# Patient Record
Sex: MALE | Race: WHITE | NOT HISPANIC OR LATINO | Employment: UNEMPLOYED | URBAN - METROPOLITAN AREA
[De-identification: names, ages, dates, MRNs, and addresses within clinical notes are randomized per-mention and may not be internally consistent; named-entity substitution may affect disease eponyms.]

---

## 2017-02-14 ENCOUNTER — LAB VISIT (OUTPATIENT)
Dept: LAB | Facility: HOSPITAL | Age: 42
End: 2017-02-14
Attending: INTERNAL MEDICINE
Payer: COMMERCIAL

## 2017-02-14 ENCOUNTER — OFFICE VISIT (OUTPATIENT)
Dept: INTERNAL MEDICINE | Facility: CLINIC | Age: 42
End: 2017-02-14
Payer: COMMERCIAL

## 2017-02-14 VITALS
DIASTOLIC BLOOD PRESSURE: 66 MMHG | TEMPERATURE: 98 F | HEART RATE: 70 BPM | HEIGHT: 69 IN | BODY MASS INDEX: 23.6 KG/M2 | WEIGHT: 159.38 LBS | SYSTOLIC BLOOD PRESSURE: 118 MMHG

## 2017-02-14 DIAGNOSIS — F17.200 SMOKER: ICD-10-CM

## 2017-02-14 DIAGNOSIS — F33.0 MAJOR DEPRESSIVE DISORDER, RECURRENT EPISODE, MILD: ICD-10-CM

## 2017-02-14 DIAGNOSIS — Z79.4 CONTROLLED TYPE 2 DIABETES MELLITUS WITHOUT COMPLICATION, WITH LONG-TERM CURRENT USE OF INSULIN: Primary | ICD-10-CM

## 2017-02-14 DIAGNOSIS — Z79.4 CONTROLLED TYPE 2 DIABETES MELLITUS WITHOUT COMPLICATION, WITH LONG-TERM CURRENT USE OF INSULIN: ICD-10-CM

## 2017-02-14 DIAGNOSIS — D22.9 ATYPICAL MOLE: ICD-10-CM

## 2017-02-14 DIAGNOSIS — E11.9 CONTROLLED TYPE 2 DIABETES MELLITUS WITHOUT COMPLICATION, WITH LONG-TERM CURRENT USE OF INSULIN: ICD-10-CM

## 2017-02-14 DIAGNOSIS — H60.331 ACUTE SWIMMER'S EAR OF RIGHT SIDE: ICD-10-CM

## 2017-02-14 DIAGNOSIS — E11.9 CONTROLLED TYPE 2 DIABETES MELLITUS WITHOUT COMPLICATION, WITH LONG-TERM CURRENT USE OF INSULIN: Primary | ICD-10-CM

## 2017-02-14 LAB
ALBUMIN SERPL BCP-MCNC: 3.9 G/DL
ALP SERPL-CCNC: 73 U/L
ALT SERPL W/O P-5'-P-CCNC: 29 U/L
ANION GAP SERPL CALC-SCNC: 5 MMOL/L
AST SERPL-CCNC: 33 U/L
BILIRUB SERPL-MCNC: 0.3 MG/DL
BUN SERPL-MCNC: 10 MG/DL
CALCIUM SERPL-MCNC: 9.5 MG/DL
CHLORIDE SERPL-SCNC: 104 MMOL/L
CO2 SERPL-SCNC: 29 MMOL/L
CREAT SERPL-MCNC: 1.1 MG/DL
EST. GFR  (AFRICAN AMERICAN): >60 ML/MIN/1.73 M^2
EST. GFR  (NON AFRICAN AMERICAN): >60 ML/MIN/1.73 M^2
GLUCOSE SERPL-MCNC: 121 MG/DL
POTASSIUM SERPL-SCNC: 4.6 MMOL/L
PROT SERPL-MCNC: 6.7 G/DL
SODIUM SERPL-SCNC: 138 MMOL/L

## 2017-02-14 PROCEDURE — 2022F DILAT RTA XM EVC RTNOPTHY: CPT | Mod: S$GLB,,, | Performed by: INTERNAL MEDICINE

## 2017-02-14 PROCEDURE — 3060F POS MICROALBUMINURIA REV: CPT | Mod: S$GLB,,, | Performed by: INTERNAL MEDICINE

## 2017-02-14 PROCEDURE — 99213 OFFICE O/P EST LOW 20 MIN: CPT | Mod: S$GLB,,, | Performed by: INTERNAL MEDICINE

## 2017-02-14 PROCEDURE — 83036 HEMOGLOBIN GLYCOSYLATED A1C: CPT

## 2017-02-14 PROCEDURE — 3044F HG A1C LEVEL LT 7.0%: CPT | Mod: S$GLB,,, | Performed by: INTERNAL MEDICINE

## 2017-02-14 PROCEDURE — 99999 PR PBB SHADOW E&M-EST. PATIENT-LVL III: CPT | Mod: PBBFAC,,, | Performed by: INTERNAL MEDICINE

## 2017-02-14 PROCEDURE — 36415 COLL VENOUS BLD VENIPUNCTURE: CPT

## 2017-02-14 PROCEDURE — 80053 COMPREHEN METABOLIC PANEL: CPT

## 2017-02-14 RX ORDER — NEOMYCIN SULFATE, POLYMYXIN B SULFATE AND HYDROCORTISONE 10; 3.5; 1 MG/ML; MG/ML; [USP'U]/ML
3 SUSPENSION/ DROPS AURICULAR (OTIC) 4 TIMES DAILY
Qty: 10 ML | Refills: 0 | Status: SHIPPED | OUTPATIENT
Start: 2017-02-14 | End: 2017-02-24

## 2017-02-14 NOTE — PROGRESS NOTES
"Subjective:       Patient ID: Pierre Edmonds Jr. is a 41 y.o. male.    Chief Complaint: Diabetes    HPI - Mr. Edmonds feels well today, except for a sensation of something "running down his ear" periodically.  He's still smoking, though he plans to quit this year.  His depression/anxiety is much better now.  He's working with a counselor.  Some "zombie-like" feeling on the lexapro and wonders can he get off of that now. He feels much calmer. He's been taking it for 8 months. His BS has been averaging 150-155 post-meals at home.  Has a lingering cold with a cough for a couple of weeks.    PMH:  DM2, at goal last check.  Depression and anxiety, improving  Back and neck pain with multiple HNPs, now resolved  Smoker, 1ppd.  Wants to quit now  IBS  GERD     Meds:  Reviewed and reconciled in EPIC with patient during visit today.    Review of Systems   Constitutional: Negative for fatigue.   HENT: Negative for congestion.    Respiratory: Negative for shortness of breath.    Cardiovascular: Negative for chest pain.   Gastrointestinal: Negative for abdominal pain.   Genitourinary: Negative for difficulty urinating.   Musculoskeletal: Negative for arthralgias.   Skin: Negative for rash.   Neurological: Negative for headaches.   Psychiatric/Behavioral: Negative for sleep disturbance.       Objective:      Physical Exam   Constitutional: He is oriented to person, place, and time. He appears well-developed and well-nourished. No distress.   HENT:   Head: Normocephalic and atraumatic.   Cardiovascular: Normal rate, regular rhythm and normal heart sounds.  Exam reveals no gallop and no friction rub.    No murmur heard.  Pulmonary/Chest: No respiratory distress. He has no wheezes. He has no rales. He exhibits no tenderness.   Neurological: He is alert and oriented to person, place, and time.   Skin: Skin is warm. He is not diaphoretic. No erythema.   Psychiatric: He has a normal mood and affect. Thought content normal.     "   Assessment:       1. Controlled type 2 diabetes mellitus without complication, with long-term current use of insulin    2. Major depressive disorder, recurrent episode, mild    3. Smoker    4. Acute swimmer's ear of right side    5. Atypical mole        Plan:       Pierre was seen today for diabetes.    Diagnoses and all orders for this visit:    Controlled type 2 diabetes mellitus without complication, with long-term current use of insulin - stable.  Check a1c, cmp and opto referral.  Stay the course of treatment  -     Ambulatory consult to Optometry  -     Hemoglobin A1c; Future  -     Comprehensive metabolic panel; Future    Major depressive disorder, recurrent episode, mild - improving.  Recommend staying on the medication for at least a year (April 2017) before tapering off, as relapse might occur otherwise    Smoker - PLEASE quit    Acute swimmer's ear of right side  -     neomycin-polymyxin-hydrocortisone (CORTISPORIN) 3.5-10,000-1 mg/mL-unit/mL-% otic suspension; Place 3 drops into the right ear 4 (four) times daily.    Atypical mole - new to me.  I will ask derm to evaluate for removal  -     Ambulatory consult to Dermatology    rtc 6 months.    TERENCE Sanchez MD MPH  Staff Internist

## 2017-02-14 NOTE — MR AVS SNAPSHOT
[facility]  [address]  [facility] [address]  Phone: [phone]  Fax: [phone]                  Pierre Edmonds Jr.   2017 11:00 AM   Office Visit    Description:  Male : 1975   Provider:  Jose Sanchez II, MD   Department:  [facility]           Reason for Visit     Diabetes           Diagnoses this Visit        Comments    Controlled type 2 diabetes mellitus without complication, with long-term current use of insulin    -  Primary     Major depressive disorder, recurrent episode, mild         Smoker         Acute swimmer's ear of right side                To Do List           Future Appointments        Provider Department Dept Phone    2017 11:40 AM LAB, APPOINTMENT [facility] [phone]      Goals (5 Years of Data)     None      Follow-Up and Disposition     Return in about 6 months (around 2017).       These Medications        Disp Refills Start End    neomycin-polymyxin-hydrocortisone (CORTISPORIN) 3.5-10,000-1 mg/mL-unit/mL-% otic suspension 10 mL 0 2017    Place 3 drops into the right ear 4 (four) times daily. - Right Ear    Pharmacy: [facility]/pharmacy #[id number] - [place] [address] [facility] Ph #: [phone]         [facility]     [facility] -  Assistance  Registered nurses in the [facility] provide clinical advisement, health education, appointment booking, and other advisory services.  Call for this free service at [phone].             Medications           Message regarding Medications     Verify the changes and/or additions to your medication regime listed below are the same as discussed with your clinician today.  If any of these changes or additions are incorrect, please notify your healthcare provider.        START taking these NEW medications        Refills    neomycin-polymyxin-hydrocortisone (CORTISPORIN)  "3.5-10,000-1 mg/mL-unit/mL-% otic suspension 0    Sig: Place 3 drops into the right ear 4 (four) times daily.    Class: Normal    Route: Right Ear           Verify that the below list of medications is an accurate representation of the medications you are currently taking.  If none reported, the list may be blank. If incorrect, please contact your healthcare provider. Carry this list with you in case of emergency.           Current Medications     blood-glucose meter kit Use as instructed    escitalopram oxalate (LEXAPRO) 10 MG tablet Take 10 mg by mouth once daily.    insulin glargine (LANTUS SOLOSTAR) 100 unit/mL (3 mL) InPn pen Inject 12 Units into the skin every evening.    ONE TOUCH DELICA LANCETS 30 gauge Misc     ONETOUCH ULTRA TEST Strp     pen needle, diabetic (BD ULTRA-FINE ARMANDO PEN NEEDLES) 32 gauge x 5/32" Ndle Use for insulin injection daily    neomycin-polymyxin-hydrocortisone (CORTISPORIN) 3.5-10,000-1 mg/mL-unit/mL-% otic suspension Place 3 drops into the right ear 4 (four) times daily.           Clinical Reference Information           Your Vitals Were     BP Pulse Temp Height Weight BMI    118/66 (BP Location: Left arm, Patient Position: Sitting, BP Method: Manual) 70 98 °F (36.7 °C) (Oral) 5' 9" (1.753 m) 72.3 kg (159 lb 6.3 oz) 23.54 kg/m2      Blood Pressure          Most Recent Value    BP  118/66      Allergies as of 2/14/2017     Ceclor [Cefaclor]      Immunizations Administered on Date of Encounter - 2/14/2017     None      Orders Placed During Today's Visit      Normal Orders This Visit    Ambulatory consult to Optometry     Future Labs/Procedures Expected by Expires    Comprehensive metabolic panel  2/14/2017 5/15/2017    Hemoglobin A1c  2/14/2017 5/15/2017      Smoking Cessation     If you would like to quit smoking:   You may be eligible for free services if you are a [place] resident and started smoking cigarettes before September 1, 1988.  Call the [employer]oking [employer]essation [employer] ([employer]) toll " free at (755) 840-8696 or (559) 257-8447.   Call 1-800-QUIT-NOW if you do not meet the above criteria.            Language Assistance Services     ATTENTION: Language assistance services are available, free of charge. Please call [phone].      ATENCIÓN: Si habla español, tiene a peng disposición servicios gratuitos de asistencia lingüística. Llame al [phone].     CHÚ Ý: N?u b?n nói Ti?ng Vi?t, có các d?ch v? h? tr? ngôn ng? mi?n phí dành cho b?n. G?i s? [phone].         [facility] complies with applicable Federal civil rights laws and does not discriminate on the basis of race, color, national origin, age, disability, or sex.

## 2017-02-15 ENCOUNTER — INITIAL CONSULT (OUTPATIENT)
Dept: DERMATOLOGY | Facility: CLINIC | Age: 42
End: 2017-02-15
Payer: COMMERCIAL

## 2017-02-15 ENCOUNTER — PATIENT MESSAGE (OUTPATIENT)
Dept: INTERNAL MEDICINE | Facility: CLINIC | Age: 42
End: 2017-02-15

## 2017-02-15 DIAGNOSIS — D22.9 MULTIPLE BENIGN MELANOCYTIC NEVI: ICD-10-CM

## 2017-02-15 DIAGNOSIS — Z79.4 CONTROLLED TYPE 2 DIABETES MELLITUS WITHOUT COMPLICATION, WITH LONG-TERM CURRENT USE OF INSULIN: ICD-10-CM

## 2017-02-15 DIAGNOSIS — D48.5 NEOPLASM OF UNCERTAIN BEHAVIOR OF SKIN: ICD-10-CM

## 2017-02-15 DIAGNOSIS — E11.9 CONTROLLED TYPE 2 DIABETES MELLITUS WITHOUT COMPLICATION, WITH LONG-TERM CURRENT USE OF INSULIN: ICD-10-CM

## 2017-02-15 DIAGNOSIS — L30.0 NUMMULAR ECZEMA: Primary | ICD-10-CM

## 2017-02-15 LAB
ESTIMATED AVG GLUCOSE: 157 MG/DL
HBA1C MFR BLD HPLC: 7.1 %

## 2017-02-15 PROCEDURE — 99999 PR PBB SHADOW E&M-EST. PATIENT-LVL III: CPT | Mod: PBBFAC,,, | Performed by: DERMATOLOGY

## 2017-02-15 PROCEDURE — 99202 OFFICE O/P NEW SF 15 MIN: CPT | Mod: 25,S$GLB,, | Performed by: DERMATOLOGY

## 2017-02-15 PROCEDURE — 88305 TISSUE EXAM BY PATHOLOGIST: CPT | Performed by: PATHOLOGY

## 2017-02-15 PROCEDURE — 11101 PR BIOPSY, EACH ADDED LESION: CPT | Mod: S$GLB,,, | Performed by: DERMATOLOGY

## 2017-02-15 PROCEDURE — 11100 PR BIOPSY OF SKIN LESION: CPT | Mod: S$GLB,,, | Performed by: DERMATOLOGY

## 2017-02-15 PROCEDURE — 88342 IMHCHEM/IMCYTCHM 1ST ANTB: CPT | Mod: 26,,, | Performed by: PATHOLOGY

## 2017-02-15 RX ORDER — INSULIN GLARGINE 100 [IU]/ML
14 INJECTION, SOLUTION SUBCUTANEOUS NIGHTLY
Qty: 15 ML | Refills: 3 | Status: SHIPPED | OUTPATIENT
Start: 2017-02-15 | End: 2017-03-17 | Stop reason: SDUPTHER

## 2017-02-15 RX ORDER — TRIAMCINOLONE ACETONIDE 1 MG/G
CREAM TOPICAL 2 TIMES DAILY
Qty: 80 G | Refills: 1 | Status: SHIPPED | OUTPATIENT
Start: 2017-02-15 | End: 2017-06-02
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
## 2017-02-15 NOTE — LETTER
February 15, 2017      Jose Sanchez II, MD  [address]  [place] [address]           [facility]  [address]  [place] [address]  Phone: [phone]  Fax: [phone]          Patient: Pierre Edmonds Jr.   MR Number: [id number]   YOB: 1975   Date of Visit: 2/15/2017       Dear Dr. Jose Sanchez II:    Thank you for referring Pierre Edmonds to me for evaluation. Attached you will find relevant portions of my assessment and plan of care.    If you have questions, please do not hesitate to call me. I look forward to following Pierre Edmonds along with you.    Sincerely,    Dorothy Jeter MD    Enclosure  CC:  No Recipients    If you would like to receive this communication electronically, please contact [email or web address] or [phone] to request more information on [employer] access.    For providers and/or their staff who would like to refer a patient to [facility], please contact us through our one-stop-shop provider referral line, [facility], at [phone].    If you feel you have received this communication in error or would no longer like to receive these types of communications, please e-mail [email or web address]

## 2017-02-24 ENCOUNTER — PATIENT MESSAGE (OUTPATIENT)
Dept: OPTOMETRY | Facility: CLINIC | Age: 42
End: 2017-02-24

## 2017-03-01 ENCOUNTER — PATIENT MESSAGE (OUTPATIENT)
Dept: DERMATOLOGY | Facility: CLINIC | Age: 42
End: 2017-03-01

## 2017-03-01 ENCOUNTER — TELEPHONE (OUTPATIENT)
Dept: INTERNAL MEDICINE | Facility: CLINIC | Age: 42
End: 2017-03-01

## 2017-03-01 NOTE — TELEPHONE ENCOUNTER
----- Message from Viktoria Barcenas sent at 3/1/2017  1:05 PM CST -----  Contact: Zora with [facility]  Prior authorization for insulin glargine (LANTUS SOLOSTAR) 100 unit/mL (3 mL) InPn pen is needed.      Thanks!

## 2017-03-17 DIAGNOSIS — Z79.4 CONTROLLED TYPE 2 DIABETES MELLITUS WITHOUT COMPLICATION, WITH LONG-TERM CURRENT USE OF INSULIN: ICD-10-CM

## 2017-03-17 DIAGNOSIS — E11.9 DIABETES MELLITUS TYPE 2, CONTROLLED: ICD-10-CM

## 2017-03-17 DIAGNOSIS — E11.9 CONTROLLED TYPE 2 DIABETES MELLITUS WITHOUT COMPLICATION, WITH LONG-TERM CURRENT USE OF INSULIN: ICD-10-CM

## 2017-03-18 RX ORDER — INSULIN GLARGINE 100 [IU]/ML
14 INJECTION, SOLUTION SUBCUTANEOUS NIGHTLY
Qty: 15 ML | Refills: 3 | Status: SHIPPED | OUTPATIENT
Start: 2017-03-18 | End: 2017-04-03

## 2017-03-18 RX ORDER — PEN NEEDLE, DIABETIC 30 GX3/16"
NEEDLE, DISPOSABLE MISCELLANEOUS
Qty: 100 EACH | Refills: 3 | Status: SHIPPED | OUTPATIENT
Start: 2017-03-18 | End: 2018-03-28 | Stop reason: SDUPTHER

## 2017-03-30 ENCOUNTER — TELEPHONE (OUTPATIENT)
Dept: INTERNAL MEDICINE | Facility: CLINIC | Age: 42
End: 2017-03-30

## 2017-03-30 DIAGNOSIS — Z79.4 CONTROLLED TYPE 2 DIABETES MELLITUS WITHOUT COMPLICATION, WITH LONG-TERM CURRENT USE OF INSULIN: Primary | ICD-10-CM

## 2017-03-30 DIAGNOSIS — E11.9 CONTROLLED TYPE 2 DIABETES MELLITUS WITHOUT COMPLICATION, WITH LONG-TERM CURRENT USE OF INSULIN: Primary | ICD-10-CM

## 2017-03-30 NOTE — TELEPHONE ENCOUNTER
----- Message from Tita Montero sent at 3/30/2017 11:48 AM CDT -----  Contact: Desmond/Zora/[phone]  Zora said that she is calling in regards to needing to check the status of a request for a Prior authorization for insulin glargine (LANTUS SOLOSTAR) 100 unit/mL (3 mL) InPn pen that was requested on 3/1/2017. Please call and advise            Thank you

## 2017-04-03 NOTE — TELEPHONE ENCOUNTER
Please call Mr. Edmonds.  Tell him that his insurance will not cover his prior insulin, and that I changed it to a similar insulin called levemir.  He needs to take this one at night.    I sent that to his pharmacy.  Same dose.

## 2017-04-03 NOTE — TELEPHONE ENCOUNTER
Spoke [facility] [phone] (Id# [id number]), spoke with representative. Pt needs to try and fail : Levemir, Basaglar and Tresiba before Lantus can be approved.

## 2017-04-17 NOTE — TELEPHONE ENCOUNTER
----- Message from Jammie Oviedo sent at 4/17/2017  4:07 PM CDT -----  Contact: [phone]  RX request - refill or new RX.  Is this a refill or new RX:  refill  RX name and strength: ONE TOUCH DELICA LANCETS 30 gauge [facility]  Directions:   Is this a 30 day or 90 day RX:    Pharmacy name and phone #: [email or web address]  Comments:  Need authorization

## 2017-04-18 RX ORDER — BLOOD-GLUCOSE CONTROL, NORMAL
1 EACH MISCELLANEOUS 3 TIMES DAILY
Qty: 100 EACH | Refills: 11 | Status: SHIPPED | OUTPATIENT
Start: 2017-04-18 | End: 2021-04-27

## 2017-04-18 RX ORDER — BLOOD-GLUCOSE CONTROL, NORMAL
EACH MISCELLANEOUS
Refills: 11 | Status: CANCELLED | OUTPATIENT
Start: 2017-04-18

## 2017-05-04 ENCOUNTER — HOSPITAL ENCOUNTER (EMERGENCY)
Facility: HOSPITAL | Age: 42
Discharge: HOME | End: 2017-05-04
Attending: FAMILY MEDICINE
Payer: COMMERCIAL

## 2017-05-04 VITALS
DIASTOLIC BLOOD PRESSURE: 72 MMHG | WEIGHT: 170 LBS | OXYGEN SATURATION: 98 % | HEART RATE: 68 BPM | TEMPERATURE: 98 F | SYSTOLIC BLOOD PRESSURE: 130 MMHG | BODY MASS INDEX: 25.18 KG/M2 | RESPIRATION RATE: 16 BRPM | HEIGHT: 69 IN

## 2017-05-04 DIAGNOSIS — M54.42 ACUTE LEFT-SIDED LOW BACK PAIN WITH LEFT-SIDED SCIATICA: Primary | ICD-10-CM

## 2017-05-04 PROCEDURE — 99283 EMERGENCY DEPT VISIT LOW MDM: CPT

## 2017-05-04 PROCEDURE — 99284 EMERGENCY DEPT VISIT MOD MDM: CPT | Mod: ,,, | Performed by: FAMILY MEDICINE

## 2017-05-04 RX ORDER — HYDROCODONE BITARTRATE AND ACETAMINOPHEN 5; 325 MG/1; MG/1
1-2 TABLET ORAL EVERY 6 HOURS PRN
Qty: 20 TABLET | Refills: 0 | Status: SHIPPED | OUTPATIENT
Start: 2017-05-04 | End: 2017-06-02

## 2017-05-04 RX ORDER — CYCLOBENZAPRINE HCL 10 MG
10 TABLET ORAL 3 TIMES DAILY PRN
Qty: 20 TABLET | Refills: 0 | Status: SHIPPED | OUTPATIENT
Start: 2017-05-04 | End: 2017-09-07

## 2017-05-04 NOTE — ED AVS SNAPSHOT
[facility]  [address]  [facility] [address]               Pierre Edmonds Jr.   2017  6:53 PM   ED    Description:  Male : 1975   Department:  [facility]           Your Care was Coordinated By:     Provider Role From To    Giuseppe Ribera MD Attending Provider [phone]    Giuseppe Ribera MD Attending Provider 17 --    [place] PA-C Physician Assistant 17      Reason for Visit     Back Pain           Diagnoses this Visit        Comments    Acute left-sided low back pain with left-sided sciatica    -  Primary       ED Disposition     None           To Do List           Follow-up Information     Follow up with Jose Sanchez Ii, MD In 1 week.    Specialty:  Internal Medicine    Why:  As needed, If symptoms worsen    Contact information:    [address]  [facility] [address]  [phone]         These Medications        Disp Refills Start End    cyclobenzaprine (FLEXERIL) 10 MG tablet 20 tablet 0 2017     Take 1 tablet (10 mg total) by mouth 3 (three) times daily as needed for Muscle spasms. - Oral    Pharmacy: [facility]/pharmacy #[id number] - [place] [address] Ph #: [phone]       hydrocodone-acetaminophen 5-325mg (NORCO) 5-325 mg per tablet 20 tablet 0 2017     Take 1-2 tablets by mouth every 6 (six) hours as needed for Pain. - Oral    Pharmacy: [facility]/pharmacy #[id number] - [place] [address] Ph #: [phone]         [facility]     [facility] -  Assistance  Unless otherwise directed by your provider, please contact [facility], our nurse care line that is available for  assistance.     Registered nurses in the [facility] provide: appointment scheduling, clinical advisement, health education, and other advisory services.  Call: [phone] (toll free)               Medications          "  Message regarding Medications     Verify the changes and/or additions to your medication regime listed below are the same as discussed with your clinician today.  If any of these changes or additions are incorrect, please notify your healthcare provider.        START taking these NEW medications        Refills    cyclobenzaprine (FLEXERIL) 10 MG tablet 0    Sig: Take 1 tablet (10 mg total) by mouth 3 (three) times daily as needed for Muscle spasms.    Class: Print    Route: Oral    hydrocodone-acetaminophen 5-325mg (NORCO) 5-325 mg per tablet 0    Sig: Take 1-2 tablets by mouth every 6 (six) hours as needed for Pain.    Class: Print    Route: Oral           Verify that the below list of medications is an accurate representation of the medications you are currently taking.  If none reported, the list may be blank. If incorrect, please contact your healthcare provider. Carry this list with you in case of emergency.           Current Medications     insulin detemir (LEVEMIR FLEXTOUCH) 100 unit/mL (3 mL) SubQ InPn pen Inject 14 Units into the skin every evening.    triamcinolone acetonide 0.1% (KENALOG) 0.1 % cream Apply topically 2 (two) times daily. L arm and legs prn flare    blood-glucose meter kit Use as instructed    cyclobenzaprine (FLEXERIL) 10 MG tablet Take 1 tablet (10 mg total) by mouth 3 (three) times daily as needed for Muscle spasms.    escitalopram oxalate (LEXAPRO) 10 MG tablet Take 10 mg by mouth once daily.    hydrocodone-acetaminophen 5-325mg (NORCO) 5-325 mg per tablet Take 1-2 tablets by mouth every 6 (six) hours as needed for Pain.    lancets (ONETOUCH DELICA LANCETS) 30 gauge Misc Inject 1 lancet into the skin 3 (three) times daily. Use for fingersticks up to three times daily    ONETOUCH ULTRA TEST Strp     pen needle, diabetic (BD ULTRA-FINE ARMANDO PEN NEEDLES) 32 gauge x 5/32" Ndle Use for insulin injection daily           Clinical Reference Information           Your Vitals Were     BP Pulse " "Temp Resp Height Weight    130/72 68 98.1 °F (36.7 °C) (Oral) 16 5' 9" (1.753 m) 77.1 kg (170 lb)    SpO2 BMI             98% 25.1 kg/m2         Allergies as of 5/4/2017        Reactions    Ceclor [Cefaclor] Hives      Immunizations Administered on Date of Encounter - 5/4/2017     None      ED Micro, Lab, POCT     None      ED Imaging Orders     None        Discharge Instructions         Sciatica    Sciatica is a condition that causes pain in the lower back that spreads down into the buttock, hip, and leg. Sometimes the leg pain can happen without any back pain. Sciatica happens when a spinal nerve is irritated or has pressure put on it as comes out of the spinal canal in the lower back. This most often happens when a bulge or rupture of a nearby spinal disk presses on the nerve. Sciatica can also be caused by a narrowing of the spinal canal (spinal stenosis) or spasm of the muscle in the buttocks that the sciatic nerve passes through (pyriform muscle). Sciatica is also called lumbar radiculopathy.  Sciatica may begin after a sudden twisting or bending force, such as in a car accident. Or it can happen after a simple awkward movement. In either case, muscle spasm often also happens. Muscle spasm makes the pain worse.  A healthcare provider makes a diagnosis of sciatica from your symptoms and a physical exam. Unless you had an injury from a car accident or fall, you usually wont have X-rays taken at this time. This is because the nerves and disks in your back cant be seen on an X-ray. If the provider sees signs of a compressed nerve, you will need to schedule an MRI scan as an outpatient. Signs of a compressed nerve include loss of strength in a leg.  Most sciatica gets better with medicine, exercise, and physical therapy. If your symptoms continue after at least 3 months of medical treatment, you may need surgery or injections to your lower back.  Home care  Follow these tips when caring for yourself at " home:  · You may need to stay in bed the first few days. But as soon as possible, begin sitting up or walking. This will help you avoid problems that come from staying in bed for long periods.  · When in bed, try to find a position that is comfortable. A firm mattress is best. Try lying flat on your back with pillows under your knees. You can also try lying on your side with your knees bent up toward your chest and a pillow between your knees.  · Avoid sitting for long periods. This puts more stress on your lower back than standing or walking.  · Use heat from a hot shower, hot bath, or heating pad to help ease pain. Massage can also help. You can also try using an ice pack. You can make your own ice pack by putting ice cubes in a plastic bag. Wrap the bag in a thin towel. Try both heat and cold to see which works best. Use the method that feels best for 20 minutes several times a day.  · You may use acetaminophen or ibuprofen to ease pain, unless another pain medicine was prescribed. Note: If you have chronic liver or kidney disease, talk with your healthcare provider before taking these medicines. Also talk with your provider if youve had a stomach ulcer or gastrointestinal bleeding.  · Use safe lifting methods. Dont lift anything heavier than 15 pounds until all of the pain is gone.  Follow-up care  Follow up with your healthcare provider, or as advised. You may need physical therapy or additional tests.  If X-rays were taken, a radiologist will look at them. You will be told of any new findings that may affect your care.  When to seek medical advice  Call your healthcare provider right away if any of these occur:  · Pain gets worse even after taking prescribed medicine  · Weakness or numbness in 1 or both legs or hips  · Numbness in your groin or genital area  · You cant control your bowel or bladder  · Fever  · Redness or swelling over your back or spine   Date Last Reviewed: 8/1/2016  © 4053-9146 [employer]. [address] [place] [address], [place] [address]. All rights reserved. This information is not intended as a substitute for professional medical care. Always follow your healthcare professional's instructions.          Understanding Lumbar Radiculopathy    Lumbar radiculopathy is irritation or inflammation of a nerve root in the low back. It causes symptoms that spread out from the back down one or both legs. To understand this condition, it helps to understand the parts of the spine:  · Vertebrae. These are bones that stack to form the spine. The lumbar spine contains the 5 bottom vertebrae.  · Disks. These are soft pads of tissue between the vertebrae. They act as shock absorbers for the spine.  · Spinal canal. This is a tunnel formed within the stacked vertebrae. In the lumbar spine, nerves run through this canal.  · Nerves. These branch off and leave the spinal canal, traveling out to parts of the body. As they leave the spinal canal, nerves pass through openings between the vertebrae. The nerve root is the part of the nerve that is closest to the spinal canal.  · Sciatic nerve. This is a large nerve formed from several nerve roots in the low back. This nerve extends down the back of the leg to the foot.  With lumbar radiculopathy, nerve roots in the low back become irritated. This leads to pain and symptoms. The sciatic nerve is commonly involved, so the condition is often called sciatica.  What causes lumbar radiculopathy?  Aging, injury, poor posture, extra body weight, and other issues can lead to problems in the low back. These problems may then irritate nerve roots. They include:  · Damage to a disk in the lumbar spine. The damaged disk may then press on nearby nerve roots.  · Degeneration from wear and tear, and aging. This can lead to narrowing (stenosis) of the openings between the vertebrae. The narrowed openings press on nerve roots as they leave the spinal canal.  · Unstable spine.  This is when a vertebra slips forward. It can then press on a nerve root.  Other, less common things can put pressure on nerves in the low back. These include diabetes, infection, or a tumor.  Symptoms of lumbar radiculopathy  These include:  · Pain in the low back  · Pain, numbness, tingling, or weakness that travels into the buttocks, hip, groin, or leg  · Muscle spasms  Treatment for lumbar radiculopathy  In most cases, your healthcare provider will first try treatments that help relieve symptoms. These may include:  · Prescription and over-the-counter pain medicines. These help relieve pain, swelling, and irritation.  · Limits on positions and activities that increase pain. But lying in bed or avoiding all movement is only recommended for a short period of time.  · Physical therapy, including exercises and stretches. This helps decrease pain and increase movement and function.  · Steroid shots into the lower back. This may help relieve symptoms for a time.  · Weight-loss program. If you are overweight, losing extra pounds may help relieve symptoms.  In some cases, you may need surgery to fix the underlying problem. This depends on the cause, the symptoms, and how long the pain has lasted.  Possible complications  Over time, an irritated and inflamed nerve may become damaged. This may lead to long-lasting (permanent) numbness or weakness in your legs and feet. If symptoms change suddenly or get worse, be sure to let your healthcare provider know.  When to call your healthcare provider  Call your healthcare provider right away if you have any of these:  · New pain or pain that gets worse  · New or increasing weakness, tingling, or numbness in your leg or foot  · Problems controlling your bladder or bowel   Date Last Reviewed: 3/10/2016  © 2663-3004 [employer]. [address] [place] [address], [place] [address]. All rights reserved. This information is not intended as a substitute for professional medical  care. Always follow your healthcare professional's instructions.          Smoking Cessation     If you would like to quit smoking:   You may be eligible for free services if you are a [place] resident and started smoking cigarettes before September 1, 1988.  Call the Smoking Cessation Trust (SCT) toll free at (650) 750-7412 or (039) 269-1985.   Call 1-800-QUIT-NOW if you do not meet the above criteria.   Contact us via email: [email or web address]   View our website for more information: [email or web address]         [facility] complies with applicable Federal civil rights laws and does not discriminate on the basis of race, color, national origin, age, disability, or sex.        Language Assistance Services     ATTENTION: Language assistance services are available, free of charge. Please call [phone].      ATENCIÓN: Si habla español, tiene a peng disposición servicios gratuitos de asistencia lingüística. Llame al [phone].     CHÚ Ý: N?u b?n nói Ti?ng Vi?t, có các d?ch v? h? tr? ngôn ng? mi?n phí dành cho b?n. G?i s? [phone].

## 2017-05-05 NOTE — DISCHARGE INSTRUCTIONS
Sciatica    Sciatica is a condition that causes pain in the lower back that spreads down into the buttock, hip, and leg. Sometimes the leg pain can happen without any back pain. Sciatica happens when a spinal nerve is irritated or has pressure put on it as comes out of the spinal canal in the lower back. This most often happens when a bulge or rupture of a nearby spinal disk presses on the nerve. Sciatica can also be caused by a narrowing of the spinal canal (spinal stenosis) or spasm of the muscle in the buttocks that the sciatic nerve passes through (pyriform muscle). Sciatica is also called lumbar radiculopathy.  Sciatica may begin after a sudden twisting or bending force, such as in a car accident. Or it can happen after a simple awkward movement. In either case, muscle spasm often also happens. Muscle spasm makes the pain worse.  A healthcare provider makes a diagnosis of sciatica from your symptoms and a physical exam. Unless you had an injury from a car accident or fall, you usually wont have X-rays taken at this time. This is because the nerves and disks in your back cant be seen on an X-ray. If the provider sees signs of a compressed nerve, you will need to schedule an MRI scan as an outpatient. Signs of a compressed nerve include loss of strength in a leg.  Most sciatica gets better with medicine, exercise, and physical therapy. If your symptoms continue after at least 3 months of medical treatment, you may need surgery or injections to your lower back.  Home care  Follow these tips when caring for yourself at home:  · You may need to stay in bed the first few days. But as soon as possible, begin sitting up or walking. This will help you avoid problems that come from staying in bed for long periods.  · When in bed, try to find a position that is comfortable. A firm mattress is best. Try lying flat on your back with pillows under your knees. You can also try lying on your side with your knees bent up  toward your chest and a pillow between your knees.  · Avoid sitting for long periods. This puts more stress on your lower back than standing or walking.  · Use heat from a hot shower, hot bath, or heating pad to help ease pain. Massage can also help. You can also try using an ice pack. You can make your own ice pack by putting ice cubes in a plastic bag. Wrap the bag in a thin towel. Try both heat and cold to see which works best. Use the method that feels best for 20 minutes several times a day.  · You may use acetaminophen or ibuprofen to ease pain, unless another pain medicine was prescribed. Note: If you have chronic liver or kidney disease, talk with your healthcare provider before taking these medicines. Also talk with your provider if youve had a stomach ulcer or gastrointestinal bleeding.  · Use safe lifting methods. Dont lift anything heavier than 15 pounds until all of the pain is gone.  Follow-up care  Follow up with your healthcare provider, or as advised. You may need physical therapy or additional tests.  If X-rays were taken, a radiologist will look at them. You will be told of any new findings that may affect your care.  When to seek medical advice  Call your healthcare provider right away if any of these occur:  · Pain gets worse even after taking prescribed medicine  · Weakness or numbness in 1 or both legs or hips  · Numbness in your groin or genital area  · You cant control your bowel or bladder  · Fever  · Redness or swelling over your back or spine   Date Last Reviewed: 8/1/2016  © 2569-3330 [employer]. [address] [place] [address], [place] [address]. All rights reserved. This information is not intended as a substitute for professional medical care. Always follow your healthcare professional's instructions.          Understanding Lumbar Radiculopathy    Lumbar radiculopathy is irritation or inflammation of a nerve root in the low back. It causes symptoms that spread out from the  back down one or both legs. To understand this condition, it helps to understand the parts of the spine:  · Vertebrae. These are bones that stack to form the spine. The lumbar spine contains the 5 bottom vertebrae.  · Disks. These are soft pads of tissue between the vertebrae. They act as shock absorbers for the spine.  · Spinal canal. This is a tunnel formed within the stacked vertebrae. In the lumbar spine, nerves run through this canal.  · Nerves. These branch off and leave the spinal canal, traveling out to parts of the body. As they leave the spinal canal, nerves pass through openings between the vertebrae. The nerve root is the part of the nerve that is closest to the spinal canal.  · Sciatic nerve. This is a large nerve formed from several nerve roots in the low back. This nerve extends down the back of the leg to the foot.  With lumbar radiculopathy, nerve roots in the low back become irritated. This leads to pain and symptoms. The sciatic nerve is commonly involved, so the condition is often called sciatica.  What causes lumbar radiculopathy?  Aging, injury, poor posture, extra body weight, and other issues can lead to problems in the low back. These problems may then irritate nerve roots. They include:  · Damage to a disk in the lumbar spine. The damaged disk may then press on nearby nerve roots.  · Degeneration from wear and tear, and aging. This can lead to narrowing (stenosis) of the openings between the vertebrae. The narrowed openings press on nerve roots as they leave the spinal canal.  · Unstable spine. This is when a vertebra slips forward. It can then press on a nerve root.  Other, less common things can put pressure on nerves in the low back. These include diabetes, infection, or a tumor.  Symptoms of lumbar radiculopathy  These include:  · Pain in the low back  · Pain, numbness, tingling, or weakness that travels into the buttocks, hip, groin, or leg  · Muscle spasms  Treatment for lumbar  radiculopathy  In most cases, your healthcare provider will first try treatments that help relieve symptoms. These may include:  · Prescription and over-the-counter pain medicines. These help relieve pain, swelling, and irritation.  · Limits on positions and activities that increase pain. But lying in bed or avoiding all movement is only recommended for a short period of time.  · Physical therapy, including exercises and stretches. This helps decrease pain and increase movement and function.  · Steroid shots into the lower back. This may help relieve symptoms for a time.  · Weight-loss program. If you are overweight, losing extra pounds may help relieve symptoms.  In some cases, you may need surgery to fix the underlying problem. This depends on the cause, the symptoms, and how long the pain has lasted.  Possible complications  Over time, an irritated and inflamed nerve may become damaged. This may lead to long-lasting (permanent) numbness or weakness in your legs and feet. If symptoms change suddenly or get worse, be sure to let your healthcare provider know.  When to call your healthcare provider  Call your healthcare provider right away if you have any of these:  · New pain or pain that gets worse  · New or increasing weakness, tingling, or numbness in your leg or foot  · Problems controlling your bladder or bowel   Date Last Reviewed: 3/10/2016  © 3419-6813 [employer]. [address] [place] [address], [place] [address]. All rights reserved. This information is not intended as a substitute for professional medical care. Always follow your healthcare professional's instructions.

## 2017-05-05 NOTE — ED TRIAGE NOTES
Pt arrived to the ED with CC of back pain that started this morning. Pt states he has taken 8 advil throughout the day with little to no relief. Pt denies numbness or tingling.

## 2017-05-05 NOTE — PROVIDER PROGRESS NOTES - EMERGENCY DEPT.
Encounter Date: 5/4/2017    ED Physician Progress Notes        Physician Note:    Reviewed data from the [place], [place] and [facility] controlled drug databases.  Regarding prescriptions.  No worrisome or adverse prescription drug usage patterns identified.

## 2017-06-02 ENCOUNTER — HOSPITAL ENCOUNTER (OUTPATIENT)
Dept: RADIOLOGY | Facility: HOSPITAL | Age: 42
Discharge: HOME | End: 2017-06-02
Attending: INTERNAL MEDICINE
Payer: COMMERCIAL

## 2017-06-02 ENCOUNTER — OFFICE VISIT (OUTPATIENT)
Dept: INTERNAL MEDICINE | Facility: CLINIC | Age: 42
End: 2017-06-02
Payer: COMMERCIAL

## 2017-06-02 VITALS
DIASTOLIC BLOOD PRESSURE: 72 MMHG | SYSTOLIC BLOOD PRESSURE: 118 MMHG | OXYGEN SATURATION: 97 % | WEIGHT: 168.44 LBS | BODY MASS INDEX: 24.95 KG/M2 | TEMPERATURE: 98 F | HEIGHT: 69 IN | HEART RATE: 68 BPM

## 2017-06-02 DIAGNOSIS — R05.3 CHRONIC COUGH: ICD-10-CM

## 2017-06-02 DIAGNOSIS — F17.200 SMOKER: ICD-10-CM

## 2017-06-02 DIAGNOSIS — F41.9 ANXIETY AND DEPRESSION: ICD-10-CM

## 2017-06-02 DIAGNOSIS — E11.9 CONTROLLED TYPE 2 DIABETES MELLITUS WITHOUT COMPLICATION, WITH LONG-TERM CURRENT USE OF INSULIN: Primary | ICD-10-CM

## 2017-06-02 DIAGNOSIS — Z79.4 CONTROLLED TYPE 2 DIABETES MELLITUS WITHOUT COMPLICATION, WITH LONG-TERM CURRENT USE OF INSULIN: Primary | ICD-10-CM

## 2017-06-02 DIAGNOSIS — F32.A ANXIETY AND DEPRESSION: ICD-10-CM

## 2017-06-02 LAB
CREAT UR-MCNC: 85 MG/DL
MICROALBUMIN UR DL<=1MG/L-MCNC: 3 UG/ML
MICROALBUMIN/CREATININE RATIO: 3.5 UG/MG

## 2017-06-02 PROCEDURE — 3045F PR MOST RECENT HEMOGLOBIN A1C LEVEL 7.0-9.0%: CPT | Mod: S$GLB,,, | Performed by: INTERNAL MEDICINE

## 2017-06-02 PROCEDURE — 99999 PR PBB SHADOW E&M-EST. PATIENT-LVL IV: CPT | Mod: PBBFAC,,, | Performed by: INTERNAL MEDICINE

## 2017-06-02 PROCEDURE — 99214 OFFICE O/P EST MOD 30 MIN: CPT | Mod: S$GLB,,, | Performed by: INTERNAL MEDICINE

## 2017-06-02 PROCEDURE — 82570 ASSAY OF URINE CREATININE: CPT

## 2017-06-02 PROCEDURE — 71020 XR CHEST PA AND LATERAL: CPT | Mod: TC

## 2017-06-02 PROCEDURE — 71020 XR CHEST PA AND LATERAL: CPT | Mod: 26,,, | Performed by: RADIOLOGY

## 2017-06-02 RX ORDER — BENZONATATE 100 MG/1
100 CAPSULE ORAL 3 TIMES DAILY PRN
Qty: 60 CAPSULE | Refills: 3 | Status: SHIPPED | OUTPATIENT
Start: 2017-06-02 | End: 2017-08-01

## 2017-06-02 NOTE — PROGRESS NOTES
Subjective:       Patient ID: Pierre Edmonds Jr. is a 41 y.o. male.    Chief Complaint: Cough (worse in the pm) and Shortness of Breath (worse in the pm)    HPI - Mr. Edmonds is going through a divorce, and he has several complaints this am.  He has had 8 months of daily cough, which co-incides with increasing his tobacco use to 3ppd!  He coughs so much that he has pain in his back/chest.  Some sinus drainage; has been to ER for pain in back from coughing - improved.  He's taking mucinex and claritin without much relief of his cough.  He has also started drinking a lot of diet coke (6l/d), and vaping marijuana for his pain syndromes.  No alcohol use.    He thinks his anxiety and depression are much improved, though the above symptoms would argue otherwise.  He is now off lexapro.      BS has been widely variant, as high as 175 and low's in the 40's late in the day. Diet has been poor since he moved out of the house - a lot of takeout and fast food. Takes his levemir in the evenings.  He's due for a lot of diabetic health maintenance.    PMH:  DM2, unclear control  Depression and anxiety, unstable  Back and neck pain with multiple HNPs, now resolved  Smoker.  Says he wants to quit, but declines wellbutrin and smoking cessation referral  IBS  GERD     Meds:  Reviewed and reconciled in EPIC with patient during visit today.    Review of Systems   Constitutional: Negative for fatigue.   HENT: Negative for congestion.    Respiratory: Positive for cough.    Cardiovascular: Negative for chest pain.   Gastrointestinal: Positive for nausea.   Genitourinary: Negative for difficulty urinating.   Musculoskeletal: Positive for back pain.   Skin: Negative for rash.   Neurological: Positive for dizziness.   Psychiatric/Behavioral: Positive for dysphoric mood. The patient is nervous/anxious.        Objective:      Physical Exam   Constitutional: He is oriented to person, place, and time. He appears well-developed and well-nourished.  No distress.   Thin WM smelling strongly of cigarettes.  Flat affect   HENT:   Head: Normocephalic and atraumatic.   Cardiovascular: Normal rate, regular rhythm and normal heart sounds.  Exam reveals no gallop and no friction rub.    No murmur heard.  Pulmonary/Chest: No respiratory distress. He has no wheezes. He has no rales. He exhibits no tenderness.   Neurological: He is alert and oriented to person, place, and time.   Skin: Skin is warm. He is not diaphoretic. No erythema.   Psychiatric: He has a normal mood and affect. Thought content normal.   Nursing note and vitals reviewed.      Assessment:       1. Controlled type 2 diabetes mellitus without complication, with long-term current use of insulin    2. Smoker    3. Anxiety and depression    4. Chronic cough        Plan:       Pierre was seen today for cough and shortness of breath.    Diagnoses and all orders for this visit:    Controlled type 2 diabetes mellitus without complication, with long-term current use of insulin - unclear control.  Needs lab work and diabetic health maintenance.  -     Hemoglobin A1c; Future  -     Ambulatory consult to Optometry  -     Lipid panel; Future  -     Microalbumin/creatinine urine ratio    Smoker - worsening.  Encouraged him to quit; offered referral to smoking cessation clinic and wellbutrin, but he declined  -     X-Ray Chest PA And Lateral; Future    Anxiety and depression - seems worse to me, based on symptoms.  Not surprising, given social situation.  Encouraged moving to another antidepressant, but he declined.  Will see in a month to monitor    Chronic cough - most likely d/t increased smoking.  CXR showed a ?pulmonary nodule, so we'll proceed with a chest CT.  Tessalon to try to suppress cough; please stop  -     benzonatate (TESSALON) 100 MG capsule; Take 1 capsule (100 mg total) by mouth 3 (three) times daily as needed for Cough.  -     X-Ray Chest PA And Lateral; Future    rtc prn, or one month.    TERENCE Hope  MD Daniel MPH  Staff Internist

## 2017-06-04 ENCOUNTER — PATIENT MESSAGE (OUTPATIENT)
Dept: INTERNAL MEDICINE | Facility: CLINIC | Age: 42
End: 2017-06-04

## 2017-06-21 ENCOUNTER — PATIENT MESSAGE (OUTPATIENT)
Dept: INTERNAL MEDICINE | Facility: CLINIC | Age: 42
End: 2017-06-21

## 2017-06-21 DIAGNOSIS — R91.1 PULMONARY NODULE: Primary | ICD-10-CM

## 2017-06-27 ENCOUNTER — TELEPHONE (OUTPATIENT)
Dept: RADIOLOGY | Facility: HOSPITAL | Age: 42
End: 2017-06-27

## 2017-06-28 ENCOUNTER — HOSPITAL ENCOUNTER (OUTPATIENT)
Dept: RADIOLOGY | Facility: HOSPITAL | Age: 42
Discharge: HOME | End: 2017-06-28
Attending: INTERNAL MEDICINE
Payer: COMMERCIAL

## 2017-06-28 DIAGNOSIS — R91.1 PULMONARY NODULE: ICD-10-CM

## 2017-06-28 PROCEDURE — 71250 CT THORAX DX C-: CPT | Mod: 26,,, | Performed by: RADIOLOGY

## 2017-06-28 PROCEDURE — 71250 CT THORAX DX C-: CPT | Mod: TC

## 2017-07-19 DIAGNOSIS — F33.0 MAJOR DEPRESSIVE DISORDER, RECURRENT EPISODE, MILD: ICD-10-CM

## 2017-07-19 RX ORDER — ESCITALOPRAM OXALATE 10 MG/1
10 TABLET ORAL DAILY
Qty: 90 TABLET | Refills: 3 | Status: SHIPPED | OUTPATIENT
Start: 2017-07-19 | End: 2017-09-07

## 2017-09-07 ENCOUNTER — PROCEDURE VISIT (OUTPATIENT)
Dept: OPTOMETRY | Facility: CLINIC | Age: 42
End: 2017-09-07
Attending: INTERNAL MEDICINE
Payer: COMMERCIAL

## 2017-09-07 ENCOUNTER — OFFICE VISIT (OUTPATIENT)
Dept: INTERNAL MEDICINE | Facility: CLINIC | Age: 42
End: 2017-09-07
Payer: COMMERCIAL

## 2017-09-07 ENCOUNTER — LAB VISIT (OUTPATIENT)
Dept: LAB | Facility: HOSPITAL | Age: 42
End: 2017-09-07
Attending: INTERNAL MEDICINE
Payer: COMMERCIAL

## 2017-09-07 VITALS
HEART RATE: 60 BPM | DIASTOLIC BLOOD PRESSURE: 80 MMHG | BODY MASS INDEX: 24.62 KG/M2 | SYSTOLIC BLOOD PRESSURE: 121 MMHG | HEIGHT: 69 IN | TEMPERATURE: 98 F | WEIGHT: 166.25 LBS

## 2017-09-07 DIAGNOSIS — R61 NIGHT SWEATS: ICD-10-CM

## 2017-09-07 DIAGNOSIS — Z79.4 CONTROLLED TYPE 2 DIABETES MELLITUS WITHOUT COMPLICATION, WITH LONG-TERM CURRENT USE OF INSULIN: ICD-10-CM

## 2017-09-07 DIAGNOSIS — E11.9 CONTROLLED TYPE 2 DIABETES MELLITUS WITHOUT COMPLICATION, WITH LONG-TERM CURRENT USE OF INSULIN: Primary | ICD-10-CM

## 2017-09-07 DIAGNOSIS — M77.12 LATERAL EPICONDYLITIS OF BOTH ELBOWS: ICD-10-CM

## 2017-09-07 DIAGNOSIS — F17.200 SMOKER: ICD-10-CM

## 2017-09-07 DIAGNOSIS — E11.9 CONTROLLED TYPE 2 DIABETES MELLITUS WITHOUT COMPLICATION, WITH LONG-TERM CURRENT USE OF INSULIN: ICD-10-CM

## 2017-09-07 DIAGNOSIS — R91.8 PULMONARY NODULES: ICD-10-CM

## 2017-09-07 DIAGNOSIS — M77.11 LATERAL EPICONDYLITIS OF BOTH ELBOWS: ICD-10-CM

## 2017-09-07 DIAGNOSIS — Z79.4 CONTROLLED TYPE 2 DIABETES MELLITUS WITHOUT COMPLICATION, WITH LONG-TERM CURRENT USE OF INSULIN: Primary | ICD-10-CM

## 2017-09-07 LAB
ALBUMIN SERPL BCP-MCNC: 4.1 G/DL
ALP SERPL-CCNC: 61 U/L
ALT SERPL W/O P-5'-P-CCNC: 24 U/L
ANION GAP SERPL CALC-SCNC: 7 MMOL/L
AST SERPL-CCNC: 26 U/L
BILIRUB SERPL-MCNC: 0.4 MG/DL
BUN SERPL-MCNC: 15 MG/DL
CALCIUM SERPL-MCNC: 9.9 MG/DL
CHLORIDE SERPL-SCNC: 105 MMOL/L
CO2 SERPL-SCNC: 29 MMOL/L
CREAT SERPL-MCNC: 1.2 MG/DL
CREAT UR-MCNC: 72 MG/DL
EST. GFR  (AFRICAN AMERICAN): >60 ML/MIN/1.73 M^2
EST. GFR  (NON AFRICAN AMERICAN): >60 ML/MIN/1.73 M^2
ESTIMATED AVG GLUCOSE: 126 MG/DL
GLUCOSE SERPL-MCNC: 77 MG/DL
HBA1C MFR BLD HPLC: 6 %
MICROALBUMIN UR DL<=1MG/L-MCNC: <2.5 UG/ML
MICROALBUMIN/CREATININE RATIO: NORMAL UG/MG
POTASSIUM SERPL-SCNC: 5 MMOL/L
PROT SERPL-MCNC: 7.4 G/DL
SODIUM SERPL-SCNC: 141 MMOL/L
TESTOST SERPL-MCNC: >1500 NG/DL

## 2017-09-07 PROCEDURE — 80053 COMPREHEN METABOLIC PANEL: CPT

## 2017-09-07 PROCEDURE — 83036 HEMOGLOBIN GLYCOSYLATED A1C: CPT

## 2017-09-07 PROCEDURE — 84403 ASSAY OF TOTAL TESTOSTERONE: CPT

## 2017-09-07 PROCEDURE — 3044F HG A1C LEVEL LT 7.0%: CPT | Mod: S$GLB,,, | Performed by: INTERNAL MEDICINE

## 2017-09-07 PROCEDURE — 82570 ASSAY OF URINE CREATININE: CPT

## 2017-09-07 PROCEDURE — 92250 FUNDUS PHOTOGRAPHY W/I&R: CPT | Mod: S$GLB,,, | Performed by: OPHTHALMOLOGY

## 2017-09-07 PROCEDURE — 99214 OFFICE O/P EST MOD 30 MIN: CPT | Mod: S$GLB,,, | Performed by: INTERNAL MEDICINE

## 2017-09-07 PROCEDURE — 3008F BODY MASS INDEX DOCD: CPT | Mod: S$GLB,,, | Performed by: INTERNAL MEDICINE

## 2017-09-07 PROCEDURE — 99999 PR PBB SHADOW E&M-EST. PATIENT-LVL III: CPT | Mod: PBBFAC,,, | Performed by: INTERNAL MEDICINE

## 2017-09-07 PROCEDURE — 36415 COLL VENOUS BLD VENIPUNCTURE: CPT

## 2017-09-07 NOTE — PROGRESS NOTES
Subjective:       Patient ID: Pierre Edmonds Jr. is a 42 y.o. male.    Chief Complaint: Diabetes    HPI - Mr. Edmonds is still smoking.  He's cut down from 3 ppd to 1ppd, though.  Living situation has improved since he moved out of the house.  He's got tennis elbow on both arms; thinks it's due to the way he's lifting weights in the gym.  He has adapted this to a different model, and thinks this is going to help.    He had pulmonary nodules on the right on a CT scan in June 2016; these were thought to be low risk in a nonsmoker, but he is a smoker.  He's having really bad night sweats - enough to soak the bedclothes and awakening him from sleep.  These were worse when he took the levemir at night.  He shifted to am and they went away for a few months; now back for the past month.  No fevers or weight loss.  He took a testosterone supplement at the gym a few months back, but not any more.  Not taking his BS when this happens.    PMH:  DM2, well controlled  Depression and anxiety, improving  Back and neck pain with multiple HNPs, now resolved  Smoker who is cutting down  IBS  GERD     Meds:  Reviewed and reconciled in EPIC with patient during visit today.    Review of Systems   Constitutional: Positive for diaphoresis. Negative for fever and unexpected weight change.   HENT: Negative for congestion.    Respiratory: Negative for shortness of breath.    Cardiovascular: Negative for chest pain.   Gastrointestinal: Negative for abdominal pain.   Genitourinary: Negative for difficulty urinating.   Musculoskeletal: Negative for arthralgias.   Skin: Negative for rash.   Neurological: Negative for headaches.   Psychiatric/Behavioral: Positive for sleep disturbance (d/t sweating).       Objective:      Physical Exam   Constitutional: He is oriented to person, place, and time. He appears well-developed and well-nourished. No distress.   HENT:   Head: Normocephalic and atraumatic.   Cardiovascular: Normal rate, regular rhythm and  normal heart sounds.  Exam reveals no gallop and no friction rub.    No murmur heard.  Pulmonary/Chest: No respiratory distress. He has no wheezes. He has no rales. He exhibits no tenderness.   Abdominal: Hernia confirmed negative in the right inguinal area and confirmed negative in the left inguinal area.   Genitourinary: Penis normal. Right testis shows no mass, no swelling and no tenderness. Left testis shows no mass (smaller, softer), no swelling and no tenderness. Circumcised.   Lymphadenopathy: No inguinal adenopathy noted on the right or left side.   Neurological: He is alert and oriented to person, place, and time.   Skin: Skin is warm. He is not diaphoretic. No erythema.   Psychiatric: He has a normal mood and affect. Thought content normal.       Assessment:       1. Controlled type 2 diabetes mellitus without complication, with long-term current use of insulin    2. Smoker    3. Lateral epicondylitis of both elbows    4. Pulmonary nodules    5. Night sweats        Plan:       Pierre was seen today for diabetes.    Diagnoses and all orders for this visit:    Controlled type 2 diabetes mellitus without complication, with long-term current use of insulin - stable, controlled.  Time for lab work  -     Hemoglobin A1c; Future  -     Diabetic Eye Screening Photo; Future  -     Comprehensive metabolic panel; Future  -     Microalbumin/creatinine urine ratio    Smoker - improving.  He wants to quit - gave positive feedback on reducing this    Lateral epicondylitis of both elbows - discussed etiology and treatments.   a brace for the forearm    Pulmonary nodules - unstable.  Need repeat CT scan in June 2018    Night sweats - worsening complaint.  Likely d/t hypoglycemia, but r/o gonadal failure.  Also possibly CA, but he has no other symptoms to point that direction. Check BS when he has another sweat; looking at testosterone levels.  If this workup is unrevealing, consider chest CT earlier  -      Testosterone; Future    rtc 3 months    G Jayy Sanchez MD MPH  Staff Internist

## 2017-09-07 NOTE — PROGRESS NOTES
HPI     Diabetic Eye Exam    Additional comments: Photos           Comments   42 y.o. y/o here for screening for Diabetic Renopathy with non-dilated   fundus photos per Jose Sanchez Ii, MD    Small pupils        Last edited by Lauren Meek MA on 9/7/2017 10:11 AM. (History)            Assessment /Plan     For exam results, see Encounter Report.    Controlled type 2 diabetes mellitus without complication, with long-term current use of insulin  -     Diabetic Eye Screening Photo

## 2017-09-08 ENCOUNTER — PATIENT MESSAGE (OUTPATIENT)
Dept: INTERNAL MEDICINE | Facility: CLINIC | Age: 42
End: 2017-09-08

## 2017-09-08 ENCOUNTER — TELEPHONE (OUTPATIENT)
Dept: OPTOMETRY | Facility: CLINIC | Age: 42
End: 2017-09-08

## 2017-12-12 ENCOUNTER — OFFICE VISIT (OUTPATIENT)
Dept: PSYCHIATRY | Facility: CLINIC | Age: 42
End: 2017-12-12
Payer: COMMERCIAL

## 2017-12-12 VITALS
HEART RATE: 80 BPM | WEIGHT: 163.19 LBS | BODY MASS INDEX: 24.1 KG/M2 | SYSTOLIC BLOOD PRESSURE: 125 MMHG | DIASTOLIC BLOOD PRESSURE: 77 MMHG

## 2017-12-12 DIAGNOSIS — F17.200 SMOKER: ICD-10-CM

## 2017-12-12 DIAGNOSIS — G47.00 INSOMNIA DISORDER WITH NON-SLEEP DISORDER MENTAL COMORBIDITY: ICD-10-CM

## 2017-12-12 DIAGNOSIS — F41.1 GAD (GENERALIZED ANXIETY DISORDER): ICD-10-CM

## 2017-12-12 DIAGNOSIS — F33.0 MAJOR DEPRESSIVE DISORDER, RECURRENT EPISODE, MILD: Primary | ICD-10-CM

## 2017-12-12 DIAGNOSIS — F42.9 OBSESSIVE-COMPULSIVE DISORDER, UNSPECIFIED TYPE: ICD-10-CM

## 2017-12-12 PROCEDURE — 99999 PR PBB SHADOW E&M-EST. PATIENT-LVL III: CPT | Mod: PBBFAC,,, | Performed by: NURSE PRACTITIONER

## 2017-12-12 PROCEDURE — 99205 OFFICE O/P NEW HI 60 MIN: CPT | Mod: S$GLB,,, | Performed by: NURSE PRACTITIONER

## 2017-12-12 RX ORDER — SERTRALINE HYDROCHLORIDE 50 MG/1
50 TABLET, FILM COATED ORAL DAILY
Qty: 30 TABLET | Refills: 5 | Status: SHIPPED | OUTPATIENT
Start: 2017-12-12 | End: 2017-12-22 | Stop reason: SDUPTHER

## 2017-12-12 RX ORDER — BUPROPION HYDROCHLORIDE 150 MG/1
TABLET, EXTENDED RELEASE ORAL
Qty: 60 TABLET | Refills: 5 | Status: SHIPPED | OUTPATIENT
Start: 2017-12-12 | End: 2019-03-22

## 2017-12-12 RX ORDER — ZOLPIDEM TARTRATE 10 MG/1
10 TABLET ORAL NIGHTLY PRN
Qty: 30 TABLET | Refills: 5 | Status: SHIPPED | OUTPATIENT
Start: 2017-12-12 | End: 2018-05-29

## 2017-12-14 NOTE — PROGRESS NOTES
"Outpatient Psychiatry Initial Visit (MD/NP)    12/12/2017    Pierre Edmonds Jr., a 42 y.o. male, presenting for initial evaluation visit. Met with patient.    Reason for Encounter: Referral from Jose Sanchez II, MD. Patient complains of anxiety and depression.    History of Present Illness:   Pt stated, "for the past few years I've had issues with anxiety and depression".  He has past history of OCD, depression, Type 2 Dm, and chronic lumbar pain from herniated disc.  He reports taking (Lexapro 10 mg daily) which he felt was initially helpful but experienced rebound symptoms after 6 months.  He tried Prozac and experienced adverse event of hallucinations.  He currently endorses chronic symptoms of insomnia (only sleeps 2-3 hrs/night), decreased appetite, social withdrawal, anhedonia, avolition, excessive worrying/apprehension, and anxiety-related muscle tension. Denies SI/HI.  Denies AH/VH.  No symptoms of mir endorsed.  No seizure history.  Pt has history of OCD behavior rituals that he would do 3 times.      Psychosocial History:  Pt is  and has a 4 year old daughter, Nathalia.  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  They are currently getting .  He identifies his current stressors as (unemployment and divorce).  He smokes 1 pack cigarette/day, no ETOH, and uses cannabanoid oils.  He has used a nutrition supplement called "TEST BOOST" which caused abnormal testosterone lab levels.  Pt unable to identify any personal coping skills.  Reports family history of anxiety on maternal side.          Review Of Systems:     GENERAL:  No weight gain or loss  SKIN:  No rashes or lacerations  HEAD:  No headaches  EYES:  No exophthalmos, jaundice or blindness  EARS:  No dizziness, tinnitus or hearing loss  NOSE:  No changes in smell  MOUTH & THROAT:  No dyskinetic movements or obvious goiter  CHEST:  No shortness of breath, hyperventilation or cough  CARDIOVASCULAR:  No tachycardia " or chest pain  ABDOMEN:  No nausea, vomiting, pain, constipation or diarrhea  URINARY:  No frequency, dysuria or sexual dysfunction  ENDOCRINE:  No polydipsia, polyuria  MUSCULOSKELETAL:  No pain or stiffness of the joints  NEUROLOGIC:  No weakness, sensory changes, seizures, confusion, memory loss, tremor or other abnormal movements    Current Evaluation:     Nutritional Screening: Considering the patient's height and weight, medications, medical history and preferences, should a referral be made to the dietitian? no    Constitutional  Vitals:  Most recent vital signs, dated greater than 90 days prior to this appointment, were reviewed.    Vitals:    12/12/17 0907   BP: 125/77   Pulse: 80   Weight: 74 kg (163 lb 3.2 oz)        General:  unremarkable, age appropriate     Musculoskeletal  Muscle Strength/Tone:  no tremor, no tic   Gait & Station:  non-ataxic     Psychiatric  Speech:  no latency; no press   Mood & Affect:  anxious  congruent and appropriate   Thought Process:  normal and logical   Associations:  intact   Thought Content:  normal, no suicidality, no homicidality, delusions, or paranoia   Insight:  intact   Judgement: behavior is adequate to circumstances   Orientation:  grossly intact   Memory: intact for content of interview   Language: grossly intact   Attention Span & Concentration:  able to focus   Fund of Knowledge:  intact and appropriate to age and level of education       Relevant Elements of Neurological Exam: normal gait    Functioning in Relationships:  Spouse/partner: see above HPI  Peers: see above HPI  Employers: see above HPI    Laboratory Data  No visits with results within 1 Month(s) from this visit.   Latest known visit with results is:   Lab Visit on 09/07/2017   Component Date Value Ref Range Status    Testosterone, Total 09/07/2017 >1500* 195.0 - 1138.0 ng/dL Final    Hemoglobin A1C 09/07/2017 6.0* 4.0 - 5.6 % Final    Estimated Avg Glucose 09/07/2017 126  68 - 131 mg/dL Final  "   Sodium 09/07/2017 141  136 - 145 mmol/L Final    Potassium 09/07/2017 5.0  3.5 - 5.1 mmol/L Final    Chloride 09/07/2017 105  95 - 110 mmol/L Final    CO2 09/07/2017 29  23 - 29 mmol/L Final    Glucose 09/07/2017 77  70 - 110 mg/dL Final    BUN, Bld 09/07/2017 15  6 - 20 mg/dL Final    Creatinine 09/07/2017 1.2  0.5 - 1.4 mg/dL Final    Calcium 09/07/2017 9.9  8.7 - 10.5 mg/dL Final    Total Protein 09/07/2017 7.4  6.0 - 8.4 g/dL Final    Albumin 09/07/2017 4.1  3.5 - 5.2 g/dL Final    Total Bilirubin 09/07/2017 0.4  0.1 - 1.0 mg/dL Final    Alkaline Phosphatase 09/07/2017 61  55 - 135 U/L Final    AST 09/07/2017 26  10 - 40 U/L Final    ALT 09/07/2017 24  10 - 44 U/L Final    Anion Gap 09/07/2017 7* 8 - 16 mmol/L Final    eGFR if African American 09/07/2017 >60  >60 mL/min/1.73 m^2 Final    eGFR if non African American 09/07/2017 >60  >60 mL/min/1.73 m^2 Final         Medications  Outpatient Encounter Prescriptions as of 12/12/2017   Medication Sig Dispense Refill    blood-glucose meter kit Use as instructed 1 each 0    buPROPion (WELLBUTRIN SR) 150 MG TBSR 12 hr tablet Take 1 tablet by mouth each morning for 3 days then increase to twice daily (morning and noon) 60 tablet 5    insulin detemir (LEVEMIR FLEXTOUCH) 100 unit/mL (3 mL) SubQ InPn pen Inject 14 Units into the skin every evening. (Patient taking differently: Inject 14 Units into the skin every morning. ) 12 mL 3    lancets (ONETOUCH DELICA LANCETS) 30 gauge Misc Inject 1 lancet into the skin 3 (three) times daily. Use for fingersticks up to three times daily 100 each 11    ONETOUCH ULTRA TEST Strp       pen needle, diabetic (BD ULTRA-FINE ARMANDO PEN NEEDLES) 32 gauge x 5/32" Ndle Use for insulin injection daily 100 each 3    sertraline (ZOLOFT) 50 MG tablet Take 1 tablet (50 mg total) by mouth once daily. Take 0.5 tablet for the first 4 days then increase to 1 tablet daily. 30 tablet 5    zolpidem (AMBIEN) 10 mg Tab Take 1 tablet " (10 mg total) by mouth nightly as needed. 30 tablet 5     No facility-administered encounter medications on file as of 12/12/2017.            Assessment - Diagnosis - Goals:     Impression:       ICD-10-CM ICD-9-CM   1. Major depressive disorder, recurrent episode, mild F33.0 296.31   2. MÓNICA (generalized anxiety disorder) F41.1 300.02   3. Obsessive-compulsive disorder, unspecified type F42.9 300.3   4. Insomnia disorder with non-sleep disorder mental comorbidity G47.00 780.52   5. Smoker F17.200 305.1       Strengths and Liabilities: Strength: Patient accepts guidance/feedback, Strength: Patient is expressive/articulate., Strength: Patient is intelligent., Strength: Patient is motivated for change., Liability: Patient has no suport network., Liability: Patient has poor health., Liability: Patient lacks coping skills.    Treatment Goals:  Specify outcomes written in observable, behavioral terms:   Anxiety: acquiring relapse prevention skills, reducing negative automatic thoughts, reducing physical symptoms of anxiety and reducing time spent worrying (<30 minutes/day)  Depression: increasing energy, increasing interest in usual activities, increasing motivation, increasing self-reward for positive behaviors (one/day), increasing self-reward for positive thoughts (one/day), increasing social contacts (three/week), reducing excessive guilt, reducing fatigue and reducing negative automatic thoughts    Treatment Plan/Recommendations:   · Medication Management: The risks and benefits of medication were discussed with the patient.  · Referral for further treatment to social work team for psychotherapy  · Labs: TSH  · The treatment plan and follow up plan were reviewed with the patient.   · Refer to smoking cessation program  · Start Wellbutrin  mg po daily for 3 days then BID (am & noon)  · Start Zoloft 50 mg po daily  · Start Ambien 10 mg po q hs PRN insomnia  · Counseled pt on medications, treatment plan, and  adaptive coping skills.       Return to Clinic: 1 month    Counseling time: 33 minutes  Total time: 60 minutes

## 2017-12-21 ENCOUNTER — TELEPHONE (OUTPATIENT)
Dept: SMOKING CESSATION | Facility: CLINIC | Age: 42
End: 2017-12-21

## 2017-12-21 ENCOUNTER — PATIENT MESSAGE (OUTPATIENT)
Dept: PSYCHIATRY | Facility: CLINIC | Age: 42
End: 2017-12-21

## 2017-12-21 NOTE — TELEPHONE ENCOUNTER
Left message with my name Dominique Collazo and phone number [phone] about missed appointment to start the tobacco cessation program.

## 2017-12-22 DIAGNOSIS — F42.9 OBSESSIVE-COMPULSIVE DISORDER, UNSPECIFIED TYPE: ICD-10-CM

## 2017-12-22 DIAGNOSIS — F41.1 GAD (GENERALIZED ANXIETY DISORDER): ICD-10-CM

## 2017-12-22 DIAGNOSIS — F33.0 MAJOR DEPRESSIVE DISORDER, RECURRENT EPISODE, MILD: ICD-10-CM

## 2017-12-22 RX ORDER — SERTRALINE HYDROCHLORIDE 50 MG/1
TABLET, FILM COATED ORAL
Qty: 30 TABLET | Refills: 5 | Status: SHIPPED | OUTPATIENT
Start: 2017-12-22 | End: 2018-01-22 | Stop reason: SDUPTHER

## 2018-01-09 ENCOUNTER — TELEPHONE (OUTPATIENT)
Dept: SMOKING CESSATION | Facility: CLINIC | Age: 43
End: 2018-01-09

## 2018-01-09 NOTE — TELEPHONE ENCOUNTER
Called patient about rescheduling a missed intake appointment for the tobacco cessation program. Patient stated he is not ready to quit tobacco at this time.

## 2018-01-21 ENCOUNTER — PATIENT MESSAGE (OUTPATIENT)
Dept: PSYCHIATRY | Facility: CLINIC | Age: 43
End: 2018-01-21

## 2018-01-22 DIAGNOSIS — F41.1 GAD (GENERALIZED ANXIETY DISORDER): ICD-10-CM

## 2018-01-22 DIAGNOSIS — F42.9 OBSESSIVE-COMPULSIVE DISORDER, UNSPECIFIED TYPE: ICD-10-CM

## 2018-01-22 DIAGNOSIS — F33.0 MAJOR DEPRESSIVE DISORDER, RECURRENT EPISODE, MILD: ICD-10-CM

## 2018-01-22 RX ORDER — SERTRALINE HYDROCHLORIDE 100 MG/1
100 TABLET, FILM COATED ORAL DAILY
Qty: 30 TABLET | Refills: 3 | Status: SHIPPED | OUTPATIENT
Start: 2018-01-22 | End: 2018-03-22 | Stop reason: SDUPTHER

## 2018-02-21 ENCOUNTER — OFFICE VISIT (OUTPATIENT)
Dept: PAIN MEDICINE | Facility: CLINIC | Age: 43
End: 2018-02-21
Payer: COMMERCIAL

## 2018-02-21 VITALS
WEIGHT: 166.88 LBS | TEMPERATURE: 98 F | DIASTOLIC BLOOD PRESSURE: 70 MMHG | SYSTOLIC BLOOD PRESSURE: 141 MMHG | HEIGHT: 69 IN | BODY MASS INDEX: 24.72 KG/M2 | HEART RATE: 60 BPM

## 2018-02-21 DIAGNOSIS — M54.12 CERVICAL RADICULOPATHY: Primary | ICD-10-CM

## 2018-02-21 DIAGNOSIS — M50.30 DDD (DEGENERATIVE DISC DISEASE), CERVICAL: ICD-10-CM

## 2018-02-21 PROCEDURE — 99213 OFFICE O/P EST LOW 20 MIN: CPT | Mod: S$GLB,,, | Performed by: NURSE PRACTITIONER

## 2018-02-21 PROCEDURE — 3008F BODY MASS INDEX DOCD: CPT | Mod: S$GLB,,, | Performed by: NURSE PRACTITIONER

## 2018-02-21 PROCEDURE — 99999 PR PBB SHADOW E&M-EST. PATIENT-LVL III: CPT | Mod: PBBFAC,,, | Performed by: NURSE PRACTITIONER

## 2018-02-21 NOTE — PROGRESS NOTES
Chief Complaint:   Chief Complaint   Patient presents with    Follow-up        SUBJECTIVE: Disclaimer: This note has been generated using voice-recognition software. There may be typographical errors that have been missed during proof-reading    Interval History 2/21/2018:  The patient presents today for increased neck pain.  We have not seen the patient since June 2016, at which time he underwent a cervical ENRIQUE wit significant benefit.  He was doing well until recently.  He reports worsening neck pain which started about 2 months ago suddenly without injury.  He has tried stretching and home exercises with limited benefit.  He has tried OTC medications with limited benefit.  He would like a repeat injection.  His pain today is 4/10.  The patient denies any bowel or bladder incontinence or signs of saddle paresthesia.      Interval History 6/29/2016:  The patient returns today for follow up.  He is s/p cervical ENRIQUE on 6/15/16 with 90% relief.  He has had only a very mild neck pain since his procedure with no radicular symptoms.  He is still reporting muscle spasms to his left neck and shoulder area, which has not improved since his procedure.  He has tried ice with limited relief.  He is performing daily stretches for this with limited relief.  He has a history of lower back pain which has been well controlled since ESIs last year.  His pain today is 2/10.  The patient denies any bowel/bladder incontinence or weakness.    Interval History 6/9/2016:  Patient returns today for follow up.  We have not seen him since Aug 2015.  He has a history of neck and lower back pain previously improved with ESIs.  He states that his lower back pain has been controlled over the past few months.  His main complaint today is neck pain with intermittent radiation into his arms, worse on the left.  He is now working and also performs home stretching and therapy exercises.  His pain today is a 5/10.  The patient denies any  bowel/bladder incontinence or symptoms of saddle paresthesia.  The patient denies any major medical changes since last OV.     Interval History 08/20/2015:  Patient presents in clinic s/p TF ENRIQUE Left S1 x2 on 08/05/15. He reports significant relief in his low back pain and states his pain is a 2/10 today.  He continues to have relief in his cervical radiculopathy from previous cervical epidural injection.  Patient is currently not on any medication for pain, he has begun home exercises and is scheduled to resume his job in 2 weeks.  Patient reports no other health changes since previous encounter.    Interval History 07/02/2015:  Since previous encounter patient arrives for follow-up after Cervical ENRIQUE on 6/03/2015. Patient rates his relief as 85%. Patient also reports lower back pain that is radiating down left leg. He rates his pain as a 5/10 today.    Interval History 05/26/2015:  Patient is in clinic to follow up on Imaging.  The cervical spine did not exhibit any evidence of instability with flexion extension, there is herniated discs at C6-7 and C5-6 causing moderate or foraminal narrowing bilaterally.  There is no evidence of cord edema or abnormal cord signal.  There is associated central canal stenosis.  The patient has been started on insulin since previous encounter and is checking his blood sugar 3-4 times per day.  No other health changes since previous encounter.    Initial encounter:    Pierre DANN Edmonds Jr. presents to the clinic for the evaluation of cervical and lumbar pain. The pain started 10 years  ago following onset and symptoms have been worsening.    Brief history:      Pain Description:    The pain is located in the neck area and radiates to the bilateral upper extremities.      At BEST  0/10     At WORST  7/10 on the WORST day.      On average pain is rated as 3/10.     Today the pain is rated as 2/10    The pain is described as numbing, sharp, shooting, throbbing and electric      Symptoms  interfere with daily activity.     Exacerbating factors: Sitting and Morning.      Mitigating factors laying down.     Patient denies night fever/night sweats, urinary incontinence, bowel incontinence, significant weight loss, significant motor weakness and loss of sensations.  Patient denies any suicidal or homicidal ideations    Pain Medications:  None    Tried in Past:  NSAIDs - OTC  TCA -Never  SNRI - Zoloft, Wellbutrin and Lexapro  Anti-convulsants -Never  Muscle Relaxants - Flexeril  Opioids- [place]    Physical Therapy/Home Exercise: Yes per self     report:  Reviewed and consistent with medication use as prescribed.    Pain Procedures:   6/3/15 C7-T1 IL ENRIQUE- significant relief  7/22/15 Left S1 TF ENRIQUE  8/5/15 Left S1 TF ENRIQUE- significant relief  6/15/16 C7-T1 IL ENRIQUE- 90% relief    Chiropractor - present  Acupuncture - never  TENS unit -never  Spinal decompression -never  Joint replacement -never    Imaging: MRI Cervical spine wo contrast:  Comparison: None.    Technique: Shadow T1, T2 and STIR as well as axial T1 and T2 weighted images were obtained through the cervical spine without contrast.    Findings: There mild Modic degenerative endplate changes at C6-C7 with loss of disk height at this level. The vertebral bodies otherwise maintain normal height, signal intensity and alignment. The signal intensity in the cervical spinal cord is normal.  The craniocervical junction shows no abnormalities. Localizer images show no abnormalities.      C2-C3: No significant disk or joint pathology    C3-C4: Osteophyte formation on the left causes mild left neuroforaminal stenosis. The right neural foramen and central canal are patent    C4-C5: No significant disk or joint pathology.    C5-C6: Diffuse disk bulge with right disk osteophyte complex causes effacement of the anterior thecal sleeve and mild to moderate right neuroforaminal stenosis. Mild left neural foraminal stenosis is also present.    C6-C7: Diffuse disk  bulge with small osteophytes results in moderate bilateral neural foraminal stenosis and mild central canal stenosis.    C7-T1: No significant disk or joint pathology.      Result Impression    Multilevel degenerative disk and joint disease which is most pronounced at C5-C6 and C6-C7.          Electronically signed by: RUEL BLOOD MD  Date: 05/11/15  Time: 10:00           Xray C spine 5 view w flex/ext:    Comparison: None.    Findings: AP, lateral, oblique, flexion and extension, some residue and odontoid views of the cervical spine assess the cervical spine from the occiput to the upper thoracic spine. There is loss of the normal cervical lordosis. Mild loss of disk height  at C5-C6 and C6-C7 with minimal anterior spondylosis. The vertebral bodies maintain normal height and alignment. No instability on flexion and extension views. No significant neuroforaminal stenosis. The uncovertebral joints, odontoid view and lung   apices are within normal limits.      Result Impression    Mild degenerative disk disease in the lower cervical spine.      Electronically signed by: RUEL BLOOD MD  Date: 05/11/15  Time: 08:44        03/26/2013 [facility]Sp[facility]  Result Impression        Small left paracentral disk protrusion at L4-5, possibly impinging upon the descending left L5 nerve root.    Mild/moderate degenerative disk disease at L5-S1, noting height loss and endplate changes.  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     Social History Main Topics    Smoking status: Current Every Day Smoker      "Packs/day: 1.00    Smokeless tobacco: Not on file    Alcohol use No    Drug use: Yes     Types: Marijuana      Comment: everyday    Sexual activity: Yes     Partners: Female     Other Topics Concern    Not on file     Social History Narrative    No narrative on file     Family History   Problem Relation Age of Onset    Diabetes Mother     Hypertension Father     Diabetes Maternal Aunt     Diabetes Maternal Grandmother        Review of patient's allergies indicates:   Allergen Reactions    Ceclor [cefaclor] Hives       Current Outpatient Prescriptions   Medication Sig    blood-glucose meter kit Use as instructed    lancets (ONETOUCH DELICA LANCETS) 30 gauge Misc Inject 1 lancet into the skin 3 (three) times daily. Use for fingersticks up to three times daily    ONETOUCH ULTRA TEST Strp     pen needle, diabetic (BD ULTRA-FINE ARMANDO PEN NEEDLES) 32 gauge x 5/32" Ndle Use for insulin injection daily    sertraline (ZOLOFT) 100 MG tablet Take 1 tablet (100 mg total) by mouth once daily.    buPROPion (WELLBUTRIN SR) 150 MG TBSR 12 hr tablet Take 1 tablet by mouth each morning for 3 days then increase to twice daily (morning and noon)    insulin detemir (LEVEMIR FLEXTOUCH) 100 unit/mL (3 mL) SubQ InPn pen Inject 14 Units into the skin every evening. (Patient taking differently: Inject 14 Units into the skin every morning. )    zolpidem (AMBIEN) 10 mg Tab Take 1 tablet (10 mg total) by mouth nightly as needed.     No current facility-administered medications for this visit.        REVIEW OF SYSTEMS:    GENERAL:  No weight loss, malaise or fevers.  RESPIRATORY:  Negative for cough, wheezing or shortness of breath, patient denies any recent URI. Patient is smoker.  CARDIOVASCULAR:  Negative for chest pain, leg swelling or palpitations.  GI:  Negative for abdominal discomfort, blood in stools or black stools or change in bowel habits.  MUSCULOSKELETAL:  See HPI.  SKIN:  Negative for lesions, rash, and " "itching.  PSYCH:  No mood disorder or recent psychosocial stressors.  Patient's sleep is disturbed secondary to pain.  HEMATOLOGY/LYMPHOLOGY:  Negative for prolonged bleeding, bruising easily or swollen nodes.  Patient is not currently taking any anti-coagulants  ENDO: IDDM  NEURO:   No history of headaches, syncope, paralysis, seizures or tremors.  All other reviewed and negative other than HPI.    OBJECTIVE:    BP (!) 141/70   Pulse 60   Temp 97.9 °F (36.6 °C)   Ht 5' 9" (1.753 m)   Wt 75.7 kg (166 lb 14.2 oz)   BMI 24.65 kg/m²     PHYSICAL EXAMINATION:    GENERAL: Well appearing, in no acute distress, alert and oriented x3.  PSYCH:  Mood and affect appropriate.  SKIN: Skin color, texture, turgor normal, no rashes or lesions.  HEAD/FACE:  Normocephalic, atraumatic. Cranial nerves grossly intact.  NECK: Pain to palpation over the right trapezius muscle.  Spurling positive on the right side.  Full ROM with pain on lateral rotation and flexion.  Negative facet loading.   CV: RRR with palpation of the radial artery.  PULM: No evidence of respiratory difficulty, symmetric chest rise.  GI:  Soft and non-tender.  EXTREMITIES: Peripheral joint ROM is full and pain free without obvious instability or laxity in all four extremities. No deformities, edema, or skin discoloration. Good capillary refill.  MUSCULOSKELETAL: There is no pain with palpation over the sacroiliac joints bilaterally.  FABERs test is negative.  FADIRs test is negative.   Bilateral upper and lower extremity strength is normal and symmetric.  No atrophy or tone abnormalities are noted.  NEURO: Bilateral upper and lower extremity coordination and muscle stretch reflexes are physiologic and symmetric.  Plantar response are downgoing. No clonus.  No loss of sensation is noted. Negative Martini's bilaterally.  GAIT: Normal.    CMP  Sodium   Date Value Ref Range Status   09/07/2017 141 136 - 145 mmol/L Final     Potassium   Date Value Ref Range Status "   09/07/2017 5.0 3.5 - 5.1 mmol/L Final     Chloride   Date Value Ref Range Status   09/07/2017 105 95 - 110 mmol/L Final     CO2   Date Value Ref Range Status   09/07/2017 29 23 - 29 mmol/L Final     Glucose   Date Value Ref Range Status   09/07/2017 77 70 - 110 mg/dL Final     BUN, Bld   Date Value Ref Range Status   09/07/2017 15 6 - 20 mg/dL Final     Creatinine   Date Value Ref Range Status   09/07/2017 1.2 0.5 - 1.4 mg/dL Final     Calcium   Date Value Ref Range Status   09/07/2017 9.9 8.7 - 10.5 mg/dL Final     Total Protein   Date Value Ref Range Status   09/07/2017 7.4 6.0 - 8.4 g/dL Final     Albumin   Date Value Ref Range Status   09/07/2017 4.1 3.5 - 5.2 g/dL Final     Total Bilirubin   Date Value Ref Range Status   09/07/2017 0.4 0.1 - 1.0 mg/dL Final     Comment:     For infants and newborns, interpretation of results should be based  on gestational age, weight and in agreement with clinical  observations.  Premature Infant recommended reference ranges:  Up to 24 hours.............<8.0 mg/dL  Up to 48 hours............<12.0 mg/dL  3-5 days..................<15.0 mg/dL  6-29 days.................<15.0 mg/dL       Alkaline Phosphatase   Date Value Ref Range Status   09/07/2017 61 55 - 135 U/L Final     AST   Date Value Ref Range Status   09/07/2017 26 10 - 40 U/L Final     ALT   Date Value Ref Range Status   09/07/2017 24 10 - 44 U/L Final     Anion Gap   Date Value Ref Range Status   09/07/2017 7 (L) 8 - 16 mmol/L Final     eGFR if    Date Value Ref Range Status   09/07/2017 >60 >60 mL/min/1.73 m^2 Final     eGFR if non    Date Value Ref Range Status   09/07/2017 >60 >60 mL/min/1.73 m^2 Final     Comment:     Calculation used to obtain the estimated glomerular filtration  rate (eGFR) is the CKD-EPI equation. Since race is unknown   in our information system, the eGFR values for   -American and Non--American patients are given   for each creatinine result.            Lab Results   Component Value Date    HGBA1C 6.0 (H) 09/07/2017     Lab Results   Component Value Date    CREATININE 1.2 09/07/2017       ASSESSMENT: 42 y.o. year old male with neck and lower back pain, consistent with     Encounter Diagnoses   Name Primary?    Cervical radiculopathy Yes    DDD (degenerative disc disease), cervical      PLAN:     - Previous imaging was reviewed and discussed with the patient today.     - Will schedule repeat C7-T1 IL ENRIQUE.    The procedure, risks, benefits and options were discussed with patient. There are no contraindications to the procedure. The patient expressed understanding and agreed to proceed.  Consent obtained today.    - He reports that his back pain has been well controlled over the past few months.  Consider repeat left S1 TF ENRIQUE if needed in the future.    - The patient will continue a home exercise routine to help with pain and strengthening.      - RTC 3 weeks after procedure.    - Dr. Zavala was consulted on the patient and agrees with this plan.      The above plan and management options were discussed at length with patient. Patient is in agreement with the above and verbalized understanding.     Melly Zelaya  02/21/2018

## 2018-03-06 ENCOUNTER — HOSPITAL ENCOUNTER (OUTPATIENT)
Facility: OTHER | Age: 43
Discharge: HOME | End: 2018-03-06
Attending: ANESTHESIOLOGY | Admitting: ANESTHESIOLOGY
Payer: COMMERCIAL

## 2018-03-06 ENCOUNTER — SURGERY (OUTPATIENT)
Age: 43
End: 2018-03-06

## 2018-03-06 VITALS
WEIGHT: 165 LBS | DIASTOLIC BLOOD PRESSURE: 62 MMHG | HEIGHT: 69 IN | BODY MASS INDEX: 24.44 KG/M2 | TEMPERATURE: 98 F | SYSTOLIC BLOOD PRESSURE: 128 MMHG | RESPIRATION RATE: 18 BRPM | HEART RATE: 56 BPM | OXYGEN SATURATION: 94 %

## 2018-03-06 DIAGNOSIS — M54.12 CERVICAL RADICULOPATHY: Primary | ICD-10-CM

## 2018-03-06 LAB — POCT GLUCOSE: 102 MG/DL (ref 70–110)

## 2018-03-06 PROCEDURE — 82947 ASSAY GLUCOSE BLOOD QUANT: CPT | Performed by: ANESTHESIOLOGY

## 2018-03-06 PROCEDURE — 63600175 PHARM REV CODE 636 W HCPCS: Performed by: ANESTHESIOLOGY

## 2018-03-06 PROCEDURE — 25000003 PHARM REV CODE 250: Performed by: ANESTHESIOLOGY

## 2018-03-06 PROCEDURE — 62321 NJX INTERLAMINAR CRV/THRC: CPT | Mod: ,,, | Performed by: ANESTHESIOLOGY

## 2018-03-06 PROCEDURE — 25500020 PHARM REV CODE 255: Performed by: ANESTHESIOLOGY

## 2018-03-06 PROCEDURE — 62321 NJX INTERLAMINAR CRV/THRC: CPT | Performed by: ANESTHESIOLOGY

## 2018-03-06 PROCEDURE — 62320 NJX INTERLAMINAR CRV/THRC: CPT | Performed by: ANESTHESIOLOGY

## 2018-03-06 RX ORDER — DEXAMETHASONE SODIUM PHOSPHATE 10 MG/ML
INJECTION INTRAMUSCULAR; INTRAVENOUS
Status: DISCONTINUED | OUTPATIENT
Start: 2018-03-06 | End: 2018-03-06 | Stop reason: HOSPADM

## 2018-03-06 RX ORDER — ALPRAZOLAM 0.5 MG/1
1 TABLET, ORALLY DISINTEGRATING ORAL
Status: DISCONTINUED | OUTPATIENT
Start: 2018-03-06 | End: 2018-03-06 | Stop reason: HOSPADM

## 2018-03-06 RX ORDER — LIDOCAINE HYDROCHLORIDE 10 MG/ML
INJECTION INFILTRATION; PERINEURAL
Status: DISCONTINUED | OUTPATIENT
Start: 2018-03-06 | End: 2018-03-06 | Stop reason: HOSPADM

## 2018-03-06 RX ORDER — BUPIVACAINE HYDROCHLORIDE 2.5 MG/ML
INJECTION, SOLUTION EPIDURAL; INFILTRATION; INTRACAUDAL
Status: DISCONTINUED | OUTPATIENT
Start: 2018-03-06 | End: 2018-03-06 | Stop reason: HOSPADM

## 2018-03-06 RX ORDER — SODIUM CHLORIDE 9 MG/ML
INJECTION, SOLUTION INTRAVENOUS CONTINUOUS
Status: DISCONTINUED | OUTPATIENT
Start: 2018-03-06 | End: 2018-03-06 | Stop reason: HOSPADM

## 2018-03-06 RX ADMIN — DEXAMETHASONE SODIUM PHOSPHATE 10 MG: 10 INJECTION, SOLUTION INTRAMUSCULAR; INTRAVENOUS at 09:03

## 2018-03-06 RX ADMIN — IOHEXOL 3 ML: 300 INJECTION, SOLUTION INTRAVENOUS at 09:03

## 2018-03-06 RX ADMIN — LIDOCAINE HYDROCHLORIDE 10 ML: 10 INJECTION, SOLUTION INFILTRATION; PERINEURAL at 09:03

## 2018-03-06 RX ADMIN — ALPRAZOLAM 1 MG: 0.5 TABLET, ORALLY DISINTEGRATING ORAL at 09:03

## 2018-03-06 RX ADMIN — BUPIVACAINE HYDROCHLORIDE 10 ML: 2.5 INJECTION, SOLUTION EPIDURAL; INFILTRATION; INTRACAUDAL; PERINEURAL at 09:03

## 2018-03-06 NOTE — OP NOTE
Cervical Interlaminar Epidural Steroid Injection under Fluoroscopic Guidance.   Time-out taken to identify patient and procedure prior to starting the procedure.     Date of Procedure: 03/06/2018    PROCEDURE: Cervical Interlaminar epidural steroid injection C7/T1 under fluoroscopic guidance.     Pre-Op diagnosis: Cervical radiculopathy [M54.12]    Post-Op diagnosis: Cervical radiculopathy [M54.12]    PHYSICIAN: ELIDIA GALLAGHER     ASSISTANTS: None     MEDICATIONS INJECTED: Preservative-free Decadron 10 mg with 1mL of sterile 0.25%Bupivicaine and 3ml of preservative free normal saline.     LOCAL ANESTHETIC INJECTED: Xylocaine 1% 3mL    ESTIMATED BLOOD LOSS: none.     COMPLICATIONS: none.     TECHNIQUE: With the patient laying in a prone position, the area was prepped and draped in the usual sterile fashion using ChloraPrep and a fenestrated drape. Local anesthetic was given using a 27-gauge needle by raising a wheal and going down to the hub of the needle over the C7/T1 interlaminar space.  The interlaminar space was then approached with a 3.5 inch 18-gauge Touhy needle was introduced under fluoroscopic guidance in the AP and Lateral view. Once the Ligamentum flavum was encountered loss of resistance to saline was used to enter the epidural space. With positive loss of resistance and negative CSF or Blood, 2mL contrast dye Omnipaque (300mg/ml) was injected to confirm placement and there was no vascular runoff. The medication was then injected slowly. The patient tolerated the procedure well.       The patient was monitored after the procedure.   They were given post-procedure and discharge instructions to follow at home.  The patient was discharged in a stable condition.

## 2018-03-06 NOTE — DISCHARGE INSTRUCTIONS
Home Care Instructions Pain Management:    1. DIET:   You may resume your normal diet today.   2. BATHING:   You may shower with luke warm water.  3. DRESSING:   You may remove your bandage today.   4. ACTIVITY LEVEL:   You may resume your normal activities 24 hrs after your procedure.  5. MEDICATIONS:   You may resume your normal medications today.   6. SPECIAL INSTRUCTIONS:   No heat to the injection site for 24 hrs including, bath or shower, heating pad, moist heat, or hot tubs.    Use ice pack to injection site for any pain or discomfort.  Apply ice packs for 20 minute intervals as needed.   If you have received any sedatives by mouth today you may not drive for 12 hours.    If you have received any sedation through your IV, you may not drive for 24 hrs.     PLEASE CALL YOUR DOCTOR IF:  1. Redness or swelling around the injection site.  2. Fever of 101 degrees  3. Drainage (pus) from the injection site.  4. For any continuous bleeding (some dried blood over the incision is normal.)    FOR EMERGENCIES:   If any unusual problems or difficulties occur during clinic hours, call [phone] or [phone].   Adult Procedural Sedation Instructions    Recovery After Procedural Sedation (Adult)  You have been given medicine by vein to make you sleep during your surgery. This may have included both a pain medicine and sleeping medicine. Most of the effects have worn off. But you may still have some drowsiness for the next 6 to 8 hours.  Home care  Follow these guidelines when you get home:  · For the next 8 hours, you should be watched by a responsible adult. This person should make sure your condition is not getting worse.  · Don't drink any alcohol for the next 24 hours.  · Don't drive, operate dangerous machinery, or make important business or personal decisions during the next 24 hours.  Note: Your healthcare provider may tell you not to take any medicine by mouth for pain or sleep in the next 4 hours. These medicines may  react with the medicines you were given in the hospital. This could cause a much stronger response than usual.  Follow-up care  Follow up with your healthcare provider if you are not alert and back to your usual level of activity within 12 hours.  When to seek medical advice  Call your healthcare provider right away if any of these occur:  · Drowsiness gets worse  · Weakness or dizziness gets worse  · Repeated vomiting  · You can't be awakened   Date Last Reviewed: 10/18/2016  © 2288-0844 [employer]. [address] [place] [address]. All rights reserved. This information is not intended as a substitute for professional medical care. Always follow your healthcare professional's instructions.      Thank you for allowing us to care for you today. You may receive a survey about the care we provided. Your feedback is valuable and helps us provide excellent care throughout the community.

## 2018-03-06 NOTE — DISCHARGE SUMMARY
"Discharge Note  Short Stay      SUMMARY     Admit Date: 3/6/2018    Attending Physician: Pablo Zavala    Discharge Diagnosis: Cervical radiculopathy [M54.12]    Discharge Physician: Pablo Zavala      Discharge Date: 3/6/2018 9:54 AM     PROCEDURE: Cervical Interlaminar epidural steroid injection C7/T1 under fluoroscopic guidance.     Pre-Op diagnosis: Cervical radiculopathy [M54.12]    Post-Op diagnosis: Cervical radiculopathy [M54.12]    Disposition: Home or self care    Patient Instructions:   Current Discharge Medication List      CONTINUE these medications which have NOT CHANGED    Details   blood-glucose meter kit Use as instructed  Qty: 1 each, Refills: 0    Associated Diagnoses: Type II or unspecified type diabetes mellitus without mention of complication, uncontrolled      buPROPion (WELLBUTRIN SR) 150 MG TBSR 12 hr tablet Take 1 tablet by mouth each morning for 3 days then increase to twice daily (morning and noon)  Qty: 60 tablet, Refills: 5    Associated Diagnoses: Major depressive disorder, recurrent episode, mild; Smoker      insulin detemir (LEVEMIR FLEXTOUCH) 100 unit/mL (3 mL) SubQ InPn pen Inject 14 Units into the skin every evening.  Qty: 12 mL, Refills: 3    Associated Diagnoses: Controlled type 2 diabetes mellitus without complication, with long-term current use of insulin      lancets (ONETOUCH DELICA LANCETS) 30 gauge Misc Inject 1 lancet into the skin 3 (three) times daily. Use for fingersticks up to three times daily  Qty: 100 each, Refills: 11      ONETOUCH ULTRA TEST Strp       pen needle, diabetic (BD ULTRA-FINE ARMANDO PEN NEEDLES) 32 gauge x 5/32" Ndle Use for insulin injection daily  Qty: 100 each, Refills: 3    Associated Diagnoses: Diabetes mellitus type 2, controlled      sertraline (ZOLOFT) 100 MG tablet Take 1 tablet (100 mg total) by mouth once daily.  Qty: 30 tablet, Refills: 3    Associated Diagnoses: Major depressive disorder, recurrent episode, mild; MÓNICA (generalized " anxiety disorder); Obsessive-compulsive disorder, unspecified type      zolpidem (AMBIEN) 10 mg Tab Take 1 tablet (10 mg total) by mouth nightly as needed.  Qty: 30 tablet, Refills: 5    Associated Diagnoses: Insomnia disorder with non-sleep disorder mental comorbidity             Resume home diet and activity

## 2018-03-20 ENCOUNTER — OFFICE VISIT (OUTPATIENT)
Dept: PAIN MEDICINE | Facility: CLINIC | Age: 43
End: 2018-03-20
Payer: COMMERCIAL

## 2018-03-20 VITALS
WEIGHT: 165.31 LBS | DIASTOLIC BLOOD PRESSURE: 76 MMHG | SYSTOLIC BLOOD PRESSURE: 121 MMHG | HEIGHT: 69 IN | BODY MASS INDEX: 24.48 KG/M2 | HEART RATE: 66 BPM | RESPIRATION RATE: 18 BRPM | TEMPERATURE: 98 F

## 2018-03-20 DIAGNOSIS — M54.17 LUMBOSACRAL RADICULOPATHY: ICD-10-CM

## 2018-03-20 DIAGNOSIS — M54.12 CERVICAL RADICULOPATHY: Primary | ICD-10-CM

## 2018-03-20 DIAGNOSIS — M51.16 LUMBAR DISC HERNIATION WITH RADICULOPATHY: ICD-10-CM

## 2018-03-20 PROCEDURE — 99213 OFFICE O/P EST LOW 20 MIN: CPT | Mod: S$GLB,,, | Performed by: NURSE PRACTITIONER

## 2018-03-20 PROCEDURE — 99999 PR PBB SHADOW E&M-EST. PATIENT-LVL III: CPT | Mod: PBBFAC,,, | Performed by: NURSE PRACTITIONER

## 2018-03-20 NOTE — PROGRESS NOTES
Chief Complaint:   Chief Complaint   Patient presents with    Low-back Pain    Leg Pain     left         SUBJECTIVE: Disclaimer: This note has been generated using voice-recognition software. There may be typographical errors that have been missed during proof-reading    Interval History 3/20/2018:  The patient returns for follow up of neck and back pain.  He is s/p C7-T1 IL ENRIQUE on 3/6/18 with 90% pain relief.  His neck pain is greatly improved since his procedure.  He does report that he had some redness and flushing of his skin after the procedure.  He is diabetic.  He has noticed worsening left leg pain recently which started about 2 weeks ago.  He has noticed some improvement in the past few days.  The pain is mainly to left lower back and posterior leg.  He has had significant benefit with left S1 TF ENRIQUE in the past.  He is not having numbness to his foot at this time.  His pain today is 5/10.    Interval History 2/21/2018:  The patient presents today for increased neck pain.  We have not seen the patient since June 2016, at which time he underwent a cervical ENRIQUE wit significant benefit.  He was doing well until recently.  He reports worsening neck pain which started about 2 months ago suddenly without injury.  He has tried stretching and home exercises with limited benefit.  He has tried OTC medications with limited benefit.  He would like a repeat injection.  His pain today is 4/10.  The patient denies any bowel or bladder incontinence or signs of saddle paresthesia.      Interval History 6/29/2016:  The patient returns today for follow up.  He is s/p cervical ENRIQUE on 6/15/16 with 90% relief.  He has had only a very mild neck pain since his procedure with no radicular symptoms.  He is still reporting muscle spasms to his left neck and shoulder area, which has not improved since his procedure.  He has tried ice with limited relief.  He is performing daily stretches for this with limited relief.  He has a  history of lower back pain which has been well controlled since ESIs last year.  His pain today is 2/10.  The patient denies any bowel/bladder incontinence or weakness.    Interval History 6/9/2016:  Patient returns today for follow up.  We have not seen him since Aug 2015.  He has a history of neck and lower back pain previously improved with ESIs.  He states that his lower back pain has been controlled over the past few months.  His main complaint today is neck pain with intermittent radiation into his arms, worse on the left.  He is now working and also performs home stretching and therapy exercises.  His pain today is a 5/10.  The patient denies any bowel/bladder incontinence or symptoms of saddle paresthesia.  The patient denies any major medical changes since last OV.     Interval History 08/20/2015:  Patient presents in clinic s/p TF ENRIQUE Left S1 x2 on 08/05/15. He reports significant relief in his low back pain and states his pain is a 2/10 today.  He continues to have relief in his cervical radiculopathy from previous cervical epidural injection.  Patient is currently not on any medication for pain, he has begun home exercises and is scheduled to resume his job in 2 weeks.  Patient reports no other health changes since previous encounter.    Interval History 07/02/2015:  Since previous encounter patient arrives for follow-up after Cervical ENRIQUE on 6/03/2015. Patient rates his relief as 85%. Patient also reports lower back pain that is radiating down left leg. He rates his pain as a 5/10 today.    Interval History 05/26/2015:  Patient is in clinic to follow up on Imaging.  The cervical spine did not exhibit any evidence of instability with flexion extension, there is herniated discs at C6-7 and C5-6 causing moderate or foraminal narrowing bilaterally.  There is no evidence of cord edema or abnormal cord signal.  There is associated central canal stenosis.  The patient has been started on insulin since previous  encounter and is checking his blood sugar 3-4 times per day.  No other health changes since previous encounter.    Initial encounter:    Pierre Edmonds Jr. presents to the clinic for the evaluation of cervical and lumbar pain. The pain started 10 years  ago following onset and symptoms have been worsening.    Brief history:      Pain Description:    The pain is located in the neck area and radiates to the bilateral upper extremities.      At BEST  0/10     At WORST  7/10 on the WORST day.      On average pain is rated as 3/10.     Today the pain is rated as 2/10    The pain is described as numbing, sharp, shooting, throbbing and electric      Symptoms interfere with daily activity.     Exacerbating factors: Sitting and Morning.      Mitigating factors laying down.     Patient denies night fever/night sweats, urinary incontinence, bowel incontinence, significant weight loss, significant motor weakness and loss of sensations.  Patient denies any suicidal or homicidal ideations    Pain Medications:  None    Tried in Past:  NSAIDs - OTC  TCA -Never  SNRI - Zoloft, Wellbutrin and Lexapro  Anti-convulsants -Never  Muscle Relaxants - Flexeril  Opioids- [place] Briar    Physical Therapy/Home Exercise: Yes per self     report:  Reviewed and consistent with medication use as prescribed.    Pain Procedures:   6/3/15 C7-T1 IL ENRIQUE- significant relief  7/22/15 Left S1 TF ENRIQUE  8/5/15 Left S1 TF ENRIQUE- significant relief  6/15/16 C7-T1 IL ENRIQUE- 90% relief  3/6/18 C7-T1 IL ENRIQUE- 90% relief    Chiropractor - present  Acupuncture - never  TENS unit -never  Spinal decompression -never  Joint replacement -never    Imaging: MRI Cervical spine wo contrast:  Comparison: None.    Technique: Shadow T1, T2 and STIR as well as axial T1 and T2 weighted images were obtained through the cervical spine without contrast.    Findings: There mild Modic degenerative endplate changes at C6-C7 with loss of disk height at this level. The vertebral bodies  otherwise maintain normal height, signal intensity and alignment. The signal intensity in the cervical spinal cord is normal.  The craniocervical junction shows no abnormalities. Localizer images show no abnormalities.      C2-C3: No significant disk or joint pathology    C3-C4: Osteophyte formation on the left causes mild left neuroforaminal stenosis. The right neural foramen and central canal are patent    C4-C5: No significant disk or joint pathology.    C5-C6: Diffuse disk bulge with right disk osteophyte complex causes effacement of the anterior thecal sleeve and mild to moderate right neuroforaminal stenosis. Mild left neural foraminal stenosis is also present.    C6-C7: Diffuse disk bulge with small osteophytes results in moderate bilateral neural foraminal stenosis and mild central canal stenosis.    C7-T1: No significant disk or joint pathology.      Result Impression    Multilevel degenerative disk and joint disease which is most pronounced at C5-C6 and C6-C7.          Electronically signed by: RUEL BLOOD MD  Date: 05/11/15  Time: 10:00           Xray C spine 5 view w flex/ext:    Comparison: None.    Findings: AP, lateral, oblique, flexion and extension, some residue and odontoid views of the cervical spine assess the cervical spine from the occiput to the upper thoracic spine. There is loss of the normal cervical lordosis. Mild loss of disk height  at C5-C6 and C6-C7 with minimal anterior spondylosis. The vertebral bodies maintain normal height and alignment. No instability on flexion and extension views. No significant neuroforaminal stenosis. The uncovertebral joints, odontoid view and lung   apices are within normal limits.      Result Impression    Mild degenerative disk disease in the lower cervical spine.      Electronically signed by: RUEL BLOOD MD  Date: 05/11/15  Time: 08:44        03/26/2013 [facility] of L-Spine  Result Impression        Small left paracentral disk protrusion at L4-5,  "possibly impinging upon the descending left L5 nerve root.    Mild/moderate degenerative disk disease at L5-S1, noting height loss and endplate changes.  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     Social History Main Topics    Smoking status: Current Every Day Smoker     Packs/day: 1.00    Smokeless tobacco: Not on file    Alcohol use No    Drug use: Yes     Types: Marijuana      Comment: everyday    Sexual activity: Yes     Partners: Female     Other Topics Concern    Not on file     Social History Narrative    No narrative on file     Family History   Problem Relation Age of Onset    Diabetes Mother     Hypertension Father     Diabetes Maternal Aunt     Diabetes Maternal Grandmother        Review of patient's allergies indicates:   Allergen Reactions    Ceclor [cefaclor] Hives       Current Outpatient Prescriptions   Medication Sig    blood-glucose meter kit Use as instructed    buPROPion (WELLBUTRIN SR) 150 MG TBSR 12 hr tablet Take 1 tablet by mouth each morning for 3 days then increase to twice daily (morning and noon)    lancets (ONETOUCH DELICA LANCETS) 30 gauge Misc Inject 1 lancet into the skin 3 (three) times daily. Use for fingersticks up to three times daily    ONETOUCH ULTRA TEST Strp     pen needle, diabetic (BD ULTRA-FINE ARMANDO PEN NEEDLES) 32 gauge x 5/32" Ndle Use for insulin injection daily    sertraline (ZOLOFT) 100 MG tablet Take 1 tablet (100 mg total) by mouth once daily.    zolpidem (AMBIEN) 10 mg Tab Take 1 tablet (10 mg total) by mouth nightly as needed. " "   insulin detemir (LEVEMIR FLEXTOUCH) 100 unit/mL (3 mL) SubQ InPn pen Inject 14 Units into the skin every evening. (Patient taking differently: Inject 14 Units into the skin every morning. )     No current facility-administered medications for this visit.        REVIEW OF SYSTEMS:    GENERAL:  No weight loss, malaise or fevers.  RESPIRATORY:  Negative for cough, wheezing or shortness of breath, patient denies any recent URI. Patient is smoker.  CARDIOVASCULAR:  Negative for chest pain, leg swelling or palpitations.  GI:  Negative for abdominal discomfort, blood in stools or black stools or change in bowel habits.  MUSCULOSKELETAL:  See HPI.  SKIN:  Negative for lesions, rash, and itching.  PSYCH:  No mood disorder or recent psychosocial stressors.  Patient's sleep is disturbed secondary to pain.  HEMATOLOGY/LYMPHOLOGY:  Negative for prolonged bleeding, bruising easily or swollen nodes.  Patient is not currently taking any anti-coagulants  ENDO: IDDM  NEURO:   No history of headaches, syncope, paralysis, seizures or tremors.  All other reviewed and negative other than HPI.    OBJECTIVE:    /76   Pulse 66   Temp 97.9 °F (36.6 °C) (Oral)   Resp 18   Ht 5' 9" (1.753 m)   Wt 75 kg (165 lb 4.8 oz)   BMI 24.41 kg/m²     PHYSICAL EXAMINATION:    GENERAL: Well appearing, in no acute distress, alert and oriented x3.  PSYCH:  Mood and affect appropriate.  SKIN: Skin color, texture, turgor normal, no rashes or lesions.  HEAD/FACE:  Normocephalic, atraumatic. Cranial nerves grossly intact.  NECK: Pain to palpation over the right trapezius muscle.  Spurling negative bilaterally.  Full ROM with pain on flexion.  Negative facet loading.   BACK: No pain with palpation to lumbar spine.  Limited flexion with pain.  Mild facet loading on the left.  SLR is positive at left S1 distribution.  CV: RRR with palpation of the radial artery.  PULM: No evidence of respiratory difficulty, symmetric chest rise.  GI:  Soft and " non-tender.  EXTREMITIES: Peripheral joint ROM is full and pain free without obvious instability or laxity in all four extremities. No deformities, edema, or skin discoloration. Good capillary refill.  MUSCULOSKELETAL: There is no pain with palpation over the sacroiliac joints bilaterally.  FABERs test is negative.  FADIRs test is negative.   Bilateral upper and lower extremity strength is normal and symmetric.  No atrophy or tone abnormalities are noted.  NEURO: Bilateral upper and lower extremity coordination and muscle stretch reflexes are physiologic and symmetric.  Plantar response are downgoing. No clonus.  No loss of sensation is noted. Negative Martini's bilaterally.  GAIT: Normal.    CMP  Sodium   Date Value Ref Range Status   09/07/2017 141 136 - 145 mmol/L Final     Potassium   Date Value Ref Range Status   09/07/2017 5.0 3.5 - 5.1 mmol/L Final     Chloride   Date Value Ref Range Status   09/07/2017 105 95 - 110 mmol/L Final     CO2   Date Value Ref Range Status   09/07/2017 29 23 - 29 mmol/L Final     Glucose   Date Value Ref Range Status   09/07/2017 77 70 - 110 mg/dL Final     BUN, Bld   Date Value Ref Range Status   09/07/2017 15 6 - 20 mg/dL Final     Creatinine   Date Value Ref Range Status   09/07/2017 1.2 0.5 - 1.4 mg/dL Final     Calcium   Date Value Ref Range Status   09/07/2017 9.9 8.7 - 10.5 mg/dL Final     Total Protein   Date Value Ref Range Status   09/07/2017 7.4 6.0 - 8.4 g/dL Final     Albumin   Date Value Ref Range Status   09/07/2017 4.1 3.5 - 5.2 g/dL Final     Total Bilirubin   Date Value Ref Range Status   09/07/2017 0.4 0.1 - 1.0 mg/dL Final     Comment:     For infants and newborns, interpretation of results should be based  on gestational age, weight and in agreement with clinical  observations.  Premature Infant recommended reference ranges:  Up to 24 hours.............<8.0 mg/dL  Up to 48 hours............<12.0 mg/dL  3-5 days..................<15.0 mg/dL  6-29  days.................<15.0 mg/dL       Alkaline Phosphatase   Date Value Ref Range Status   09/07/2017 61 55 - 135 U/L Final     AST   Date Value Ref Range Status   09/07/2017 26 10 - 40 U/L Final     ALT   Date Value Ref Range Status   09/07/2017 24 10 - 44 U/L Final     Anion Gap   Date Value Ref Range Status   09/07/2017 7 (L) 8 - 16 mmol/L Final     eGFR if    Date Value Ref Range Status   09/07/2017 >60 >60 mL/min/1.73 m^2 Final     eGFR if non    Date Value Ref Range Status   09/07/2017 >60 >60 mL/min/1.73 m^2 Final     Comment:     Calculation used to obtain the estimated glomerular filtration  rate (eGFR) is the CKD-EPI equation. Since race is unknown   in our information system, the eGFR values for   -American and Non--American patients are given   for each creatinine result.           Lab Results   Component Value Date    HGBA1C 6.0 (H) 09/07/2017     Lab Results   Component Value Date    CREATININE 1.2 09/07/2017       ASSESSMENT: 42 y.o. year old male with neck and lower back pain, consistent with     Encounter Diagnoses   Name Primary?    Cervical radiculopathy Yes    Lumbar disc herniation with radiculopathy     Lumbosacral radiculopathy      PLAN:     - Previous imaging was reviewed and discussed with the patient today.     - He is s/p C7-T1 IL ENRIQUE with excellent benefit.      - He is having worsening back pain.  However, this has improved over the past few days.  He would like to continue to monitor and seek chiropractic care.  If this provides limited benefit, he may call to schedule repeat left S1 TF ENRIQUE.    - The patient will continue a home exercise routine to help with pain and strengthening.      - RTC as needed.      The above plan and management options were discussed at length with patient. Patient is in agreement with the above and verbalized understanding.     Melly Zelaya  03/20/2018

## 2018-03-22 DIAGNOSIS — F41.1 GAD (GENERALIZED ANXIETY DISORDER): ICD-10-CM

## 2018-03-22 DIAGNOSIS — F42.9 OBSESSIVE-COMPULSIVE DISORDER, UNSPECIFIED TYPE: ICD-10-CM

## 2018-03-22 DIAGNOSIS — F33.0 MAJOR DEPRESSIVE DISORDER, RECURRENT EPISODE, MILD: ICD-10-CM

## 2018-03-22 RX ORDER — SERTRALINE HYDROCHLORIDE 100 MG/1
100 TABLET, FILM COATED ORAL DAILY
Qty: 90 TABLET | Refills: 1 | Status: SHIPPED | OUTPATIENT
Start: 2018-03-22 | End: 2018-05-29 | Stop reason: SDUPTHER

## 2018-03-28 DIAGNOSIS — E11.9 TYPE 2 DIABETES MELLITUS WITHOUT COMPLICATION: ICD-10-CM

## 2018-03-28 DIAGNOSIS — E11.9 DIABETES MELLITUS TYPE 2, CONTROLLED: ICD-10-CM

## 2018-03-28 RX ORDER — PEN NEEDLE, DIABETIC 31 GX5/16"
NEEDLE, DISPOSABLE MISCELLANEOUS
Qty: 100 EACH | Refills: 3 | Status: SHIPPED | OUTPATIENT
Start: 2018-03-28 | End: 2019-06-02 | Stop reason: SDUPTHER

## 2018-04-10 DIAGNOSIS — E11.9 CONTROLLED TYPE 2 DIABETES MELLITUS WITHOUT COMPLICATION, WITH LONG-TERM CURRENT USE OF INSULIN: ICD-10-CM

## 2018-04-10 DIAGNOSIS — Z79.4 CONTROLLED TYPE 2 DIABETES MELLITUS WITHOUT COMPLICATION, WITH LONG-TERM CURRENT USE OF INSULIN: ICD-10-CM

## 2018-05-29 ENCOUNTER — OFFICE VISIT (OUTPATIENT)
Dept: PSYCHIATRY | Facility: CLINIC | Age: 43
End: 2018-05-29
Payer: COMMERCIAL

## 2018-05-29 DIAGNOSIS — F41.1 GAD (GENERALIZED ANXIETY DISORDER): ICD-10-CM

## 2018-05-29 DIAGNOSIS — F17.200 SMOKER: ICD-10-CM

## 2018-05-29 DIAGNOSIS — F33.0 MAJOR DEPRESSIVE DISORDER, RECURRENT EPISODE, MILD: Primary | ICD-10-CM

## 2018-05-29 DIAGNOSIS — F42.9 OBSESSIVE-COMPULSIVE DISORDER, UNSPECIFIED TYPE: ICD-10-CM

## 2018-05-29 PROCEDURE — 99999 PR PBB SHADOW E&M-EST. PATIENT-LVL II: CPT | Mod: PBBFAC,,, | Performed by: NURSE PRACTITIONER

## 2018-05-29 PROCEDURE — 90833 PSYTX W PT W E/M 30 MIN: CPT | Mod: S$GLB,,, | Performed by: NURSE PRACTITIONER

## 2018-05-29 PROCEDURE — 99213 OFFICE O/P EST LOW 20 MIN: CPT | Mod: S$GLB,,, | Performed by: NURSE PRACTITIONER

## 2018-05-29 RX ORDER — SERTRALINE HYDROCHLORIDE 100 MG/1
100 TABLET, FILM COATED ORAL DAILY
Qty: 90 TABLET | Refills: 2 | Status: SHIPPED | OUTPATIENT
Start: 2018-05-29 | End: 2019-02-06 | Stop reason: SDUPTHER

## 2018-05-29 NOTE — PROGRESS NOTES
Outpatient Psychiatry Follow-Up Visit (MD/NP)    5/29/2018    Clinical Status of Patient:  Outpatient (Ambulatory)    Chief Complaint:  Pierre Edmonds Jr. is a 42 y.o. male who presents today for follow-up of depression and anxiety.  Met with patient.      Last visit was: 12/12/17. Chart reviewed.     Interval History and Content of Current Session:  Current Psychiatric Medications/changes  · Start Wellbutrin  mg po daily for 3 days then BID (am & noon)  · Start Zoloft 100 mg po daily  · Start Ambien 10 mg po q hs PRN insomnia    Pt presents with bright affect and euthymic mood.  Thought processes appear clear and organized.  Pt reports that he is doing well.  Had a sad weekend but doesn't want to go up on medicaiton. Pt has not started Wellbutrin because he does not feel ready to stop smoking cigarettes.  Sleep improved and not using Ambien anymore. Reports better management of anxiety and mood. Denies SI/HI/AVH.        Psychotherapy:  · Target symptoms: depression, anxiety   · Why chosen therapy is appropriate versus another modality: relevant to diagnosis  · Outcome monitoring methods: self-report  · Therapeutic intervention type: insight oriented psychotherapy  · Topics discussed/themes: difficulty managing affect in interpersonal relationships, building skills sets for symptom management, symptom recognition  · The patient's response to the intervention is accepting. The patient's progress toward treatment goals is good.   · Duration of intervention: 18 minutes.    Review of Systems   · PSYCHIATRIC: Pertinant items are noted in the narrative.  · CONSTITUTIONAL: No weight gain or loss.   · MUSCULOSKELETAL: No pain or stiffness of the joints.  · NEUROLOGIC: No weakness, sensory changes, seizures, confusion, memory loss, tremor or other abnormal movements.  · ENDOCRINE: No polydipsia or polyuria.  · INTEGUMENTARY: No rashes or lacerations.  · EYES: No exophthalmos, jaundice or blindness.  · ENT: No  dizziness, tinnitus or hearing loss.  · RESPIRATORY: No shortness of breath.  · CARDIOVASCULAR: No tachycardia or chest pain.  · GASTROINTESTINAL: No nausea, vomiting, pain, constipation or diarrhea.  · GENITOURINARY: No frequency, dysuria or sexual dysfunction.  · HEMATOLOGIC/LYMPHATIC: No excessive bleeding, prolonged or excessive bleeding after dental extraction/injury.  · ALLERGIC/IMMUNOLOGIC: No allergic response to materials, foods or animals at this time.    Past Medical, Family and Social History: The patient's past medical, family and social history have been reviewed and updated as appropriate within the electronic medical record - see encounter notes.    Compliance: yes    Side effects: None    Risk Parameters:  Patient reports no suicidal ideation  Patient reports no homicidal ideation  Patient reports no self-injurious behavior  Patient reports no violent behavior    Exam (detailed: at least 9 elements; comprehensive: all 15 elements)   Constitutional  Vitals:  Most recent vital signs, dated greater than 90 days prior to this appointment, were reviewed.   There were no vitals filed for this visit.     General:  unremarkable, age appropriate     Musculoskeletal  Muscle Strength/Tone:  no tremor, no tic   Gait & Station:  non-ataxic     Psychiatric  Speech:  no latency; no press   Mood & Affect:  steady, euthymic  congruent and appropriate   Thought Process:  normal and logical   Associations:  intact   Thought Content:  normal, no suicidality, no homicidality, delusions, or paranoia   Insight:  intact   Judgement: behavior is adequate to circumstances   Orientation:  grossly intact   Memory: intact for content of interview   Language: grossly intact   Attention Span & Concentration:  able to focus   Fund of Knowledge:  intact and appropriate to age and level of education     Assessment and Diagnosis   Status/Progress: Based on the examination today, the patient's problem(s) is/are improved.  New problems  have not been presented today.   Co-morbidities and Lack of compliance are not complicating management of the primary condition.  There are no active rule-out diagnoses for this patient at this time.     General Impression:       ICD-10-CM ICD-9-CM   1. Major depressive disorder, recurrent episode, mild F33.0 296.31   2. MÓNICA (generalized anxiety disorder) F41.1 300.02   3. Obsessive-compulsive disorder, unspecified type F42.9 300.3   4. Smoker F17.200 305.1       Intervention/Counseling/Treatment Plan   · Medication Management: The risks and benefits of medication were discussed with the patient.   · Continue Zoloft 100 mg po daily  · Encouraged smoking cessation and Rx for Wellbutrin  mg po daily for 3 days then BID (am & noon)  · DC Ambien      Return to Clinic: 6 months

## 2018-05-30 ENCOUNTER — OFFICE VISIT (OUTPATIENT)
Dept: PSYCHIATRY | Facility: CLINIC | Age: 43
End: 2018-05-30
Payer: COMMERCIAL

## 2018-05-30 DIAGNOSIS — F33.0 MAJOR DEPRESSIVE DISORDER, RECURRENT EPISODE, MILD: Primary | ICD-10-CM

## 2018-05-30 PROCEDURE — 90791 PSYCH DIAGNOSTIC EVALUATION: CPT | Mod: S$GLB,,, | Performed by: SOCIAL WORKER
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
## 2018-06-04 ENCOUNTER — LAB VISIT (OUTPATIENT)
Dept: LAB | Facility: HOSPITAL | Age: 43
End: 2018-06-04
Payer: COMMERCIAL

## 2018-06-04 DIAGNOSIS — F17.200 SMOKER: ICD-10-CM

## 2018-06-04 DIAGNOSIS — G47.00 INSOMNIA DISORDER WITH NON-SLEEP DISORDER MENTAL COMORBIDITY: ICD-10-CM

## 2018-06-04 DIAGNOSIS — F41.1 GAD (GENERALIZED ANXIETY DISORDER): ICD-10-CM

## 2018-06-04 DIAGNOSIS — F42.9 OBSESSIVE-COMPULSIVE DISORDER, UNSPECIFIED TYPE: ICD-10-CM

## 2018-06-04 DIAGNOSIS — F33.0 MAJOR DEPRESSIVE DISORDER, RECURRENT EPISODE, MILD: ICD-10-CM

## 2018-06-04 DIAGNOSIS — E11.9 TYPE 2 DIABETES MELLITUS WITHOUT COMPLICATION: ICD-10-CM

## 2018-06-04 LAB
ESTIMATED AVG GLUCOSE: 146 MG/DL
HBA1C MFR BLD HPLC: 6.7 %
TSH SERPL DL<=0.005 MIU/L-ACNC: 2.96 UIU/ML

## 2018-06-04 PROCEDURE — 84443 ASSAY THYROID STIM HORMONE: CPT

## 2018-06-04 PROCEDURE — 83036 HEMOGLOBIN GLYCOSYLATED A1C: CPT

## 2018-06-04 PROCEDURE — 36415 COLL VENOUS BLD VENIPUNCTURE: CPT

## 2018-06-08 DIAGNOSIS — E11.9 TYPE 2 DIABETES MELLITUS WITHOUT COMPLICATION: ICD-10-CM

## 2018-07-17 ENCOUNTER — TELEPHONE (OUTPATIENT)
Dept: OPTOMETRY | Facility: CLINIC | Age: 43
End: 2018-07-17

## 2018-07-17 NOTE — TELEPHONE ENCOUNTER
Called patient to see if he would like to schedule his annual dm eye exam. Patient didn't answer the phone but left a vm so patient can call back to schedule.

## 2018-08-01 DIAGNOSIS — E11.9 CONTROLLED TYPE 2 DIABETES MELLITUS WITHOUT COMPLICATION, WITH LONG-TERM CURRENT USE OF INSULIN: ICD-10-CM

## 2018-08-01 DIAGNOSIS — Z79.4 CONTROLLED TYPE 2 DIABETES MELLITUS WITHOUT COMPLICATION, WITH LONG-TERM CURRENT USE OF INSULIN: ICD-10-CM

## 2018-08-01 NOTE — TELEPHONE ENCOUNTER
----- Message from Nikki Forman sent at 8/1/2018  9:58 AM CDT -----  Contact: [facility] [phone]  Prescription Request:     Name of medication: insulin detemir U-100 (LEVEMIR FLEXTOUCH U-100 INSULN) 100 unit/mL (3 mL) SubQ InPn pen    Reason for request: New    Pharmacy: [id number] [place] [place] [address]    Requesting 90 day supply. Insurance only pays for 90 days and does not cover refills.    Thank You

## 2019-02-06 DIAGNOSIS — F33.0 MAJOR DEPRESSIVE DISORDER, RECURRENT EPISODE, MILD: ICD-10-CM

## 2019-02-06 DIAGNOSIS — F41.1 GAD (GENERALIZED ANXIETY DISORDER): ICD-10-CM

## 2019-02-06 DIAGNOSIS — F42.9 OBSESSIVE-COMPULSIVE DISORDER, UNSPECIFIED TYPE: ICD-10-CM

## 2019-02-06 RX ORDER — SERTRALINE HYDROCHLORIDE 100 MG/1
100 TABLET, FILM COATED ORAL DAILY
Qty: 90 TABLET | Refills: 2 | Status: SHIPPED | OUTPATIENT
Start: 2019-02-06 | End: 2019-02-08 | Stop reason: SDUPTHER

## 2019-02-08 ENCOUNTER — PATIENT MESSAGE (OUTPATIENT)
Dept: PSYCHIATRY | Facility: CLINIC | Age: 44
End: 2019-02-08

## 2019-02-08 DIAGNOSIS — F33.0 MAJOR DEPRESSIVE DISORDER, RECURRENT EPISODE, MILD: ICD-10-CM

## 2019-02-08 DIAGNOSIS — F41.1 GAD (GENERALIZED ANXIETY DISORDER): ICD-10-CM

## 2019-02-08 DIAGNOSIS — F42.9 OBSESSIVE-COMPULSIVE DISORDER, UNSPECIFIED TYPE: ICD-10-CM

## 2019-02-08 RX ORDER — SERTRALINE HYDROCHLORIDE 100 MG/1
150 TABLET, FILM COATED ORAL DAILY
Qty: 135 TABLET | Refills: 1 | Status: SHIPPED | OUTPATIENT
Start: 2019-02-08 | End: 2019-03-22 | Stop reason: SDUPTHER

## 2019-02-26 ENCOUNTER — PATIENT MESSAGE (OUTPATIENT)
Dept: PSYCHIATRY | Facility: CLINIC | Age: 44
End: 2019-02-26

## 2019-03-22 ENCOUNTER — OFFICE VISIT (OUTPATIENT)
Dept: PSYCHIATRY | Facility: CLINIC | Age: 44
End: 2019-03-22
Payer: COMMERCIAL

## 2019-03-22 VITALS
DIASTOLIC BLOOD PRESSURE: 60 MMHG | HEIGHT: 69 IN | WEIGHT: 169 LBS | BODY MASS INDEX: 25.03 KG/M2 | SYSTOLIC BLOOD PRESSURE: 128 MMHG | HEART RATE: 83 BPM

## 2019-03-22 DIAGNOSIS — F41.1 GAD (GENERALIZED ANXIETY DISORDER): ICD-10-CM

## 2019-03-22 DIAGNOSIS — F42.9 OBSESSIVE-COMPULSIVE DISORDER, UNSPECIFIED TYPE: ICD-10-CM

## 2019-03-22 DIAGNOSIS — F33.0 MAJOR DEPRESSIVE DISORDER, RECURRENT EPISODE, MILD: ICD-10-CM

## 2019-03-22 PROCEDURE — 90833 PSYTX W PT W E/M 30 MIN: CPT | Mod: S$GLB,,, | Performed by: NURSE PRACTITIONER

## 2019-03-22 PROCEDURE — 3008F PR BODY MASS INDEX (BMI) DOCUMENTED: ICD-10-PCS | Mod: CPTII,S$GLB,, | Performed by: NURSE PRACTITIONER

## 2019-03-22 PROCEDURE — 99999 PR PBB SHADOW E&M-EST. PATIENT-LVL III: CPT | Mod: PBBFAC,,, | Performed by: NURSE PRACTITIONER

## 2019-03-22 PROCEDURE — 90833 PR PSYCHOTHERAPY W/PATIENT W/E&M, 30 MIN (ADD ON): ICD-10-PCS | Mod: S$GLB,,, | Performed by: NURSE PRACTITIONER

## 2019-03-22 PROCEDURE — 99214 PR OFFICE/OUTPT VISIT, EST, LEVL IV, 30-39 MIN: ICD-10-PCS | Mod: S$GLB,,, | Performed by: NURSE PRACTITIONER

## 2019-03-22 PROCEDURE — 99214 OFFICE O/P EST MOD 30 MIN: CPT | Mod: S$GLB,,, | Performed by: NURSE PRACTITIONER

## 2019-03-22 PROCEDURE — 99999 PR PBB SHADOW E&M-EST. PATIENT-LVL III: ICD-10-PCS | Mod: PBBFAC,,, | Performed by: NURSE PRACTITIONER

## 2019-03-22 PROCEDURE — 3008F BODY MASS INDEX DOCD: CPT | Mod: CPTII,S$GLB,, | Performed by: NURSE PRACTITIONER

## 2019-03-22 RX ORDER — SERTRALINE HYDROCHLORIDE 100 MG/1
100 TABLET, FILM COATED ORAL DAILY
Qty: 90 TABLET | Refills: 1 | Status: SHIPPED | OUTPATIENT
Start: 2019-03-22 | End: 2020-07-13

## 2019-03-22 NOTE — PROGRESS NOTES
"Outpatient Psychiatry Follow-Up Visit (MD/NP)    3/22/2019    Clinical Status of Patient:  Outpatient (Ambulatory)    Chief Complaint:  Pierre Edmonds Jr. is a 43 y.o. male who presents today for follow-up of depression and anxiety.  Met with patient.      Last visit was: 5/29/18. Chart and  reviewed.     Interval History and Content of Current Session:  Current Psychiatric Medications/changes  · Increased Zoloft 150 mg po daily (increased in December)  · Encouraged smoking cessation and Rx for Wellbutrin  mg po daily for 3 days then BID (am & noon)  · DC Ambien    Pt reports that depression has worsened and feels more chronic anxiety. "I can't handle stress at work". Endorses mood lability, " I feel up and down good day then bad day". Symptoms started after Zoloft increased to 150 mg.  Erika any situational stressors. Working things out with wife. Reports trouble with concentration.  Never started Wellbutrin. Still smoking.  Sleep is still intact.  Denies SI/HI/AVH.  Will decrease Zoloft.     Psychotherapy:  · Target symptoms: depression, anxiety   · Why chosen therapy is appropriate versus another modality: relevant to diagnosis  · Outcome monitoring methods: self-report  · Therapeutic intervention type: insight oriented psychotherapy  · Topics discussed/themes: difficulty managing affect in interpersonal relationships, building skills sets for symptom management, symptom recognition  · The patient's response to the intervention is accepting. The patient's progress toward treatment goals is good.   · Duration of intervention: 18 minutes.    Review of Systems   · PSYCHIATRIC: Pertinant items are noted in the narrative.  · CONSTITUTIONAL: No weight gain or loss.   · MUSCULOSKELETAL: No pain or stiffness of the joints.  · NEUROLOGIC: No weakness, sensory changes, seizures, confusion, memory loss, tremor or other abnormal movements.  · ENDOCRINE: No polydipsia or polyuria.  · INTEGUMENTARY: No rashes or " "lacerations.  · EYES: No exophthalmos, jaundice or blindness.  · ENT: No dizziness, tinnitus or hearing loss.  · RESPIRATORY: No shortness of breath.  · CARDIOVASCULAR: No tachycardia or chest pain.  · GASTROINTESTINAL: No nausea, vomiting, pain, constipation or diarrhea.  · GENITOURINARY: No frequency, dysuria or sexual dysfunction.  · HEMATOLOGIC/LYMPHATIC: No excessive bleeding, prolonged or excessive bleeding after dental extraction/injury.  · ALLERGIC/IMMUNOLOGIC: No allergic response to materials, foods or animals at this time.    Past Medical, Family and Social History: The patient's past medical, family and social history have been reviewed and updated as appropriate within the electronic medical record - see encounter notes.    Compliance: yes    Side effects: None    Risk Parameters:  Patient reports no suicidal ideation  Patient reports no homicidal ideation  Patient reports no self-injurious behavior  Patient reports no violent behavior    Exam (detailed: at least 9 elements; comprehensive: all 15 elements)   Constitutional  Vitals:  Most recent vital signs, dated greater than 90 days prior to this appointment, were reviewed.   Vitals:    03/22/19 1250   BP: 128/60   Pulse: 83   Weight: 76.6 kg (168 lb 15.7 oz)   Height: 5' 9" (1.753 m)        General:  unremarkable, age appropriate     Musculoskeletal  Muscle Strength/Tone:  no tremor, no tic   Gait & Station:  non-ataxic     Psychiatric  Speech:  no latency; no press   Mood & Affect:  steady, euthymic  congruent and appropriate   Thought Process:  normal and logical   Associations:  intact   Thought Content:  normal, no suicidality, no homicidality, delusions, or paranoia   Insight:  intact   Judgement: behavior is adequate to circumstances   Orientation:  grossly intact   Memory: intact for content of interview   Language: grossly intact   Attention Span & Concentration:  able to focus   Fund of Knowledge:  intact and appropriate to age and level of " education     Assessment and Diagnosis   Status/Progress: Based on the examination today, the patient's problem(s) is/are worsening.  New problems have not been presented today.   Co-morbidities and Lack of compliance are not complicating management of the primary condition.  There are no active rule-out diagnoses for this patient at this time.     General Impression:       ICD-10-CM ICD-9-CM   1. Major depressive disorder, recurrent episode, mild F33.0 296.31   2. MÓNICA (generalized anxiety disorder) F41.1 300.02   3. Obsessive-compulsive disorder, unspecified type F42.9 300.3       Intervention/Counseling/Treatment Plan   · Medication Management: The risks and benefits of medication were discussed with the patient.   · Decrease back to Zoloft 100 mg po daily  · May consider augmenting with Abilify or changing SSRI if symptoms do not improve.     Return to Clinic: 6 weeks

## 2019-03-28 ENCOUNTER — PATIENT OUTREACH (OUTPATIENT)
Dept: ADMINISTRATIVE | Facility: HOSPITAL | Age: 44
End: 2019-03-28

## 2019-03-28 DIAGNOSIS — E11.8 TYPE 2 DIABETES MELLITUS WITH COMPLICATION, UNSPECIFIED WHETHER LONG TERM INSULIN USE: Primary | ICD-10-CM

## 2019-03-28 NOTE — PROGRESS NOTES
Attempted to contact pt to schedule follow up with PCP. No answer, left message for return call. PT last saw Dr. Sanchez in 2017, overdue for follow up. Chart review completed. Pt is also due for DM labs, dm eye exam & foot exam.

## 2019-06-02 DIAGNOSIS — E11.9 DIABETES MELLITUS TYPE 2, CONTROLLED: ICD-10-CM

## 2019-06-03 RX ORDER — PEN NEEDLE, DIABETIC 31 GX5/16"
NEEDLE, DISPOSABLE MISCELLANEOUS
Qty: 100 EACH | Refills: 3 | Status: SHIPPED | OUTPATIENT
Start: 2019-06-03 | End: 2020-08-26

## 2019-07-24 ENCOUNTER — OFFICE VISIT (OUTPATIENT)
Dept: DERMATOLOGY | Facility: CLINIC | Age: 44
End: 2019-07-24
Payer: COMMERCIAL

## 2019-07-24 DIAGNOSIS — B07.8 OTHER VIRAL WARTS: Primary | ICD-10-CM

## 2019-07-24 DIAGNOSIS — D22.9 ATYPICAL NEVUS: ICD-10-CM

## 2019-07-24 PROCEDURE — 17110 PR DESTRUCTION BENIGN LESIONS UP TO 14: ICD-10-PCS | Mod: S$GLB,,, | Performed by: DERMATOLOGY

## 2019-07-24 PROCEDURE — 99212 OFFICE O/P EST SF 10 MIN: CPT | Mod: 25,S$GLB,, | Performed by: DERMATOLOGY

## 2019-07-24 PROCEDURE — 99999 PR PBB SHADOW E&M-EST. PATIENT-LVL II: CPT | Mod: PBBFAC,,, | Performed by: DERMATOLOGY

## 2019-07-24 PROCEDURE — 99212 PR OFFICE/OUTPT VISIT, EST, LEVL II, 10-19 MIN: ICD-10-PCS | Mod: 25,S$GLB,, | Performed by: DERMATOLOGY

## 2019-07-24 PROCEDURE — 99999 PR PBB SHADOW E&M-EST. PATIENT-LVL II: ICD-10-PCS | Mod: PBBFAC,,, | Performed by: DERMATOLOGY

## 2019-07-24 PROCEDURE — 17110 DESTRUCTION B9 LES UP TO 14: CPT | Mod: S$GLB,,, | Performed by: DERMATOLOGY

## 2019-07-24 NOTE — PROGRESS NOTES
Subjective:       Patient ID:  Pierre Edmonds Jr. is a 44 y.o. male   Chief Complaint   Patient presents with    Spot     Pt here today for spot on right wrist and neck, pt request to be removed     HPI  Pt presents for evaluation of lesion on R ventral wrist that first appeared about one year ago, pt reports that he has shaved it off a few times but it keeps coming back. Asymptomatic. Has noticed smaller, similar lesions on R upper neck within last 6 mos.    Denies other rapidly growing, bleeding, non-healing spots. Denies personal and family history of skin cancer.    Of note, at last visit in 2017, pt had a severely atypical nevus biopsied from his R upper back by EARNESTINE and was scheduled to have it further excised, but pt cancelled his appt and hasn't returned for this.    Review of Systems   Constitutional: Negative for fever, chills, weight loss, weight gain, fatigue, night sweats and malaise.   Skin: Positive for activity-related sunscreen use. Negative for daily sunscreen use and recent sunburn.   Hematologic/Lymphatic: Does not bruise/bleed easily.        Objective:    Physical Exam   Constitutional: He appears well-developed and well-nourished.   Neurological: He is alert and oriented to person, place, and time.   Psychiatric: He has a normal mood and affect.   Skin:   Areas Examined (abnormalities noted in diagram):   Head / Face Inspection Performed  Neck Inspection Performed  Back Inspection Performed  RUE Inspected  LUE Inspection Performed                       Diagram Legend     Erythematous scaling macule/papule c/w actinic keratosis       Vascular papule c/w angioma      Pigmented verrucoid papule/plaque c/w seborrheic keratosis      Yellow umbilicated papule c/w sebaceous hyperplasia      Irregularly shaped tan macule c/w lentigo     1-2 mm smooth white papules consistent with Milia      Movable subcutaneous cyst with punctum c/w epidermal inclusion cyst      Subcutaneous movable cyst c/w pilar  cyst      Firm pink to brown papule c/w dermatofibroma      Pedunculated fleshy papule(s) c/w skin tag(s)      Evenly pigmented macule c/w junctional nevus     Mildly variegated pigmented, slightly irregular-bordered macule c/w mildly atypical nevus      Flesh colored to evenly pigmented papule c/w intradermal nevus       Pink pearly papule/plaque c/w basal cell carcinoma      Erythematous hyperkeratotic cursted plaque c/w SCC      Surgical scar with no sign of skin cancer recurrence      Open and closed comedones      Inflammatory papules and pustules      Verrucoid papule consistent consistent with wart     Erythematous eczematous patches and plaques     Dystrophic onycholytic nail with subungual debris c/w onychomycosis     Umbilicated papule    Erythematous-base heme-crusted tan verrucoid plaque consistent with inflamed seborrheic keratosis     Erythematous Silvery Scaling Plaque c/w Psoriasis     See annotation      Assessment / Plan:        Other viral warts  Cryosurgery procedure note:    Verbal consent from the patient is obtained including, but not limited to, risk of hypopigmentation/hyperpigmentation, scar, recurrence of lesion. Liquid nitrogen cryosurgery is applied to 3 lesions to produce a freeze injury. The patient is aware that blisters may form and is instructed on wound care with gentle cleansing and use of vaseline ointment to keep moist until healed. The patient is supplied a handout on cryosurgery and is instructed to call if lesions do not completely resolve.    Atypical nevus  Will schedule procedure appt for excision of this recurring atypical nevus. Emphasized importance of keeping this appt       Follow up in about 4 weeks (around 8/21/2019) for wart re-treatment and as scheduled for excision of atypical nevus.

## 2019-07-24 NOTE — PATIENT INSTRUCTIONS
CRYOSURGERY      Your doctor has used a method called cryosurgery to treat your skin condition. Cryosurgery refers to the use of very cold substances to treat a variety of skin conditions such as warts, pre-skin cancers, molluscum contagiosum, sun spots, and several benign growths. The substance we use in cryosurgery is liquid nitrogen and is so cold (-195 degrees Celsius) that is burns when administered.     Following treatment in the office, the skin may immediately burn and become red. You may find the area around the lesion is affected as well. It is sometimes necessary to treat not only the lesion, but a small area of the surrounding normal skin to achieve a good response.     A blister, and even a blood filled blister, may form after treatment.   This is a normal response. If the blister is painful, it is acceptable to sterilize a needle and with rubbing alcohol and gently pop the blister. It is important that you gently wash the area with soap and warm water as the blister fluid may contain wart virus if a wart was treated. Do no remove the roof of the blister.     The area treated can take anywhere from 1-3 weeks to heal. Healing time depends on the kind skin lesion treated, the location, and how aggressively the lesion was treated. It is recommended that the areas treated are covered with Vaseline or bacitracin ointment and a band-aid. If a band-aid is not practical, just ointment applied several times per day will do. Keeping these areas moist will speed the healing time.    Treatment with liquid nitrogen can leave a scar. In dark skin, it may be a light or dark scar, in light skin it may be a white or pink scar. These will generally fade with time, but may never go away completely.     If you have any concerns after your treatment, please feel free to call the office.       [address] [place] [address]/ [phone] FAX/ [email or web address]    Summer Sun Protection      The  [facility] would like to remind you of the importance of sun protection all year round and particularly during the summer when the suns rays are the strongest. It has been proven that both acute and chronic sun exposure damages our cells and leads to skin cancer. Beyond skin cancer, the sun causes 90% of the symptoms of pre-mature skin aging, including wrinkles, lentigines (brown spots), and thin, easily bruised skin. Proper sun protection can help prevent these unwanted conditions.    Many patients report that the dont go in the sun. It has been shown that the average person receives 18 hours of incidental sun exposure per week during activities such as walking through parking lots, driving, or sitting next to windows. This accumulates to several bad sunburns per year!    In choosing sunscreen, you want one that protects against both UVA and UVB rays. It is recommended that you use one of SPF 30 or higher. It is important to apply the sunscreen about 20 minutes prior to sun exposure. Most sunscreens are chemical sunscreens and a reaction must take place in the skin so that they are effective. If they are applied and then you are immediately exposed to the sun or start sweating, this reaction has not had time to take place and you are therefore unprotected. Sunscreen needs to be reapplied every 2 hours if you are participating in water sports or sweating. We recommend Elta MD or Neutrogena Ultra Sheer Dry Touch SPF 55 for daily use; however there are many options and it is most important for you to find one that you will use on a consistent basis.    If you have sensitive skin, you may do best with a sunscreen that contains only physical blockers such as titanium dioxide or zinc oxide. These are typically thicker and harder to apply, however they afford very good protection. Neutrogena Sensitive Skin, Blue Lizard Sensitive Skin (pink top) or Neutrogena Pure and Free are popular ones.      Aside from sunscreen, clothes with UV protection, wide brimmed hats, and sunglasses are other means of sun protection that we recommend.                        [facility]  [address]  [facility] [address]  Dept: [phone]  Dept Fax: [phone]

## 2019-07-28 DIAGNOSIS — F41.1 GAD (GENERALIZED ANXIETY DISORDER): ICD-10-CM

## 2019-07-28 DIAGNOSIS — F42.9 OBSESSIVE-COMPULSIVE DISORDER, UNSPECIFIED TYPE: ICD-10-CM

## 2019-07-28 DIAGNOSIS — F33.0 MAJOR DEPRESSIVE DISORDER, RECURRENT EPISODE, MILD: ICD-10-CM

## 2019-07-28 RX ORDER — SERTRALINE HYDROCHLORIDE 100 MG/1
100 TABLET, FILM COATED ORAL DAILY
Qty: 90 TABLET | Refills: 1 | Status: SHIPPED | OUTPATIENT
Start: 2019-07-28 | End: 2020-02-17

## 2019-08-07 ENCOUNTER — PROCEDURE VISIT (OUTPATIENT)
Dept: DERMATOLOGY | Facility: CLINIC | Age: 44
End: 2019-08-07
Payer: COMMERCIAL

## 2019-08-07 DIAGNOSIS — D22.9 ATYPICAL NEVUS: Primary | ICD-10-CM

## 2019-08-07 PROCEDURE — 88305 TISSUE EXAM BY PATHOLOGIST: CPT | Mod: 26,,, | Performed by: PATHOLOGY

## 2019-08-07 PROCEDURE — 88342 TISSUE SPECIMEN TO PATHOLOGY, DERMATOLOGY: ICD-10-PCS | Mod: 26,,, | Performed by: PATHOLOGY

## 2019-08-07 PROCEDURE — 88305 TISSUE EXAM BY PATHOLOGIST: CPT | Performed by: PATHOLOGY

## 2019-08-07 PROCEDURE — 88341 TISSUE SPECIMEN TO PATHOLOGY, DERMATOLOGY: ICD-10-PCS | Mod: 26,,, | Performed by: PATHOLOGY

## 2019-08-07 PROCEDURE — 99499 UNLISTED E&M SERVICE: CPT | Mod: S$GLB,,, | Performed by: DERMATOLOGY

## 2019-08-07 PROCEDURE — 12032 INTMD RPR S/A/T/EXT 2.6-7.5: CPT | Mod: S$GLB,,, | Performed by: DERMATOLOGY

## 2019-08-07 PROCEDURE — 88341 IMHCHEM/IMCYTCHM EA ADD ANTB: CPT | Mod: 26,,, | Performed by: PATHOLOGY

## 2019-08-07 PROCEDURE — 11402 EXC TR-EXT B9+MARG 1.1-2 CM: CPT | Mod: 59,S$GLB,, | Performed by: DERMATOLOGY

## 2019-08-07 PROCEDURE — 12032 PR LAYR CLOS WND TRUNK,ARM,LEG 2.6-7.5 CM: ICD-10-PCS | Mod: S$GLB,,, | Performed by: DERMATOLOGY

## 2019-08-07 PROCEDURE — 99499 NO LOS: ICD-10-PCS | Mod: S$GLB,,, | Performed by: DERMATOLOGY

## 2019-08-07 PROCEDURE — 88342 IMHCHEM/IMCYTCHM 1ST ANTB: CPT | Performed by: PATHOLOGY

## 2019-08-07 PROCEDURE — 11402 PR EXC SKIN BENIG 1.1-2 CM TRUNK,ARM,LEG: ICD-10-PCS | Mod: 59,S$GLB,, | Performed by: DERMATOLOGY

## 2019-08-07 PROCEDURE — 88342 IMHCHEM/IMCYTCHM 1ST ANTB: CPT | Mod: 26,,, | Performed by: PATHOLOGY

## 2019-08-07 PROCEDURE — 88305 TISSUE SPECIMEN TO PATHOLOGY, DERMATOLOGY: ICD-10-PCS | Mod: 26,,, | Performed by: PATHOLOGY

## 2019-08-07 NOTE — PROGRESS NOTES
PROCEDURE: Elliptical excision with intermediate layered repair in order to decrease dead space and decrease tension.    ANESTHETIC: 7.5 cc 1% Xylocaine with Epinephrine 1:100,000, buffered    SURGEON: Elenita Chou M.D.    ASSISTANTS: Leonard Gibbs LPN    PREOPERATIVE DIAGNOSIS:  Severely Atypical Nevus    POSTOPERATIVE DIAGNOSIS:  Same as preoperative diagnosis    PATHOLOGIC DIAGNOSIS: Pending    LOCATION: R upper back    INITIAL LESION SIZE: 0.7x0.6 cm    EXCISED DIAMETER: 1.7 cm    PREPARATION: The diagnosis, procedure, alternatives, benefits and risks, including but not limited to: infection, bleeding/bruising, drug reactions, pain, scar or cosmetic defect, local sensation disturbances, wound dehiscence (separation of wound edges after sutures removed) and/or recurrence of present condition were explained to the patient. The patient elected to proceed.  Patient's identity was verified using 2 patient identifiers and the side and site was verified.  Time out period with surgeon, assistant and patient in surgical suite was taken.    PROCEDURE: The location noted above was prepped, draped, and anesthetized in the usual sterile fashion per Leonard Gibbs LPN. Lesional tissue was carefully marked with at least 5 mm margins of clinically normal skin in all directions. A fusiform elliptical excision was done with #15 blade carried down completely through the dermis into the deep subcutaneous tissues to the level of the non-muscle fascia, and dissection was carried out in that plane.  Electrocoagulation was used to obtain hemostasis. Blood loss was minimal. The wound was then approximated in a layered fashion with subcutaneous and intradermal sutures of 4.0 Monocryl, approximately 6 in number, and the wound was then superficially closed with simple interrupted sutures of 4.0 Prolene.    The patient tolerated the procedure well.    The area was cleaned and dressed appropriately and the patient was given wound care  instructions, as well as an appointment for follow-up evaluation.    LENGTH OF REPAIR: 4 cm    rtc 2 wks for suture removal

## 2019-08-07 NOTE — PATIENT INSTRUCTIONS
"Post- Operative Wound Care    Your doctor has performed local skin surgery today.  Vaseline ointment and a pressure bandage were placed after the surgery.  It is very important that you keep this bandage in place for 24 hours.  This will decrease the risk of post - operative infection and bleeding.  After 24 hours, you may remove the band aid and wash the area with warm soap and water and apply Vaseline ointment.  Many patients prefer to use Neosporin or Bacitracin ointment.  This is acceptable; however know that you can develop an allergy to this medication even if you have used it safely for years.  It is important to keep the area moist.  Letting it dry out and get air slows healing time, will worsen the scar, and make it more difficult to remove the stitches.  Band aid is optional after first 24 hours.        If you notice increasing redness, tenderness, pain, or yellow drainage at the biopsy or surgical site, please notify your doctor.  These are signs of an infection.    If your biopsy/surgical site is bleeding, apply firm pressure for 15 minutes straight.  Repeat for another 15 minutes, if it is still bleeding.   If the surgical site continues to bleed, then please contact your doctor.      For [facility] users:   You will receive a [facility] notification after the pathologist has finished reviewing your biopsy specimen. Pathology results, however, will not be released online so you will see a "no content" message. Once your dermatologist reviews and clinically correlates your biopsy results, you will either receive a letter in the mail with the results of a phone call from your doctor's office if further explanation or treatment is warranted.       [address] [place] [address]/ [phone] FAX/ [email or web address]         "

## 2019-08-16 DIAGNOSIS — Z79.4 CONTROLLED TYPE 2 DIABETES MELLITUS WITHOUT COMPLICATION, WITH LONG-TERM CURRENT USE OF INSULIN: ICD-10-CM

## 2019-08-16 DIAGNOSIS — E11.9 CONTROLLED TYPE 2 DIABETES MELLITUS WITHOUT COMPLICATION, WITH LONG-TERM CURRENT USE OF INSULIN: ICD-10-CM

## 2019-08-21 ENCOUNTER — CLINICAL SUPPORT (OUTPATIENT)
Dept: DERMATOLOGY | Facility: CLINIC | Age: 44
End: 2019-08-21
Payer: COMMERCIAL

## 2019-08-21 DIAGNOSIS — Z48.02 VISIT FOR SUTURE REMOVAL: Primary | ICD-10-CM

## 2019-08-21 PROCEDURE — 99999 PR PBB SHADOW E&M-EST. PATIENT-LVL II: ICD-10-PCS | Mod: PBBFAC,,,

## 2019-08-21 PROCEDURE — 99024 PR POST-OP FOLLOW-UP VISIT: ICD-10-PCS | Mod: S$GLB,,, | Performed by: DERMATOLOGY

## 2019-08-21 PROCEDURE — 99024 POSTOP FOLLOW-UP VISIT: CPT | Mod: S$GLB,,, | Performed by: DERMATOLOGY

## 2019-08-21 PROCEDURE — 99999 PR PBB SHADOW E&M-EST. PATIENT-LVL II: CPT | Mod: PBBFAC,,,

## 2019-08-21 NOTE — PROGRESS NOTES
Suture Removal note:  CC: 44 y.o. male patient is here for suture removal.         HPI: Patient is s/p excision of severely atypical nevus biopsied in 2017. Please check margins and rule out melanomaf  from the right upper back on Right upper back.  Patient reports no problems.    WOUND PE:  Sutures intact.  Wound healing well.  Good approximation of skin edges.  No signs or symptoms of infection.    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    PLAN:  Sutures removed today.  Continue wound care.    RTC:PRN

## 2019-08-27 ENCOUNTER — PATIENT OUTREACH (OUTPATIENT)
Dept: ADMINISTRATIVE | Facility: HOSPITAL | Age: 44
End: 2019-08-27

## 2019-08-27 DIAGNOSIS — Z79.4 CONTROLLED TYPE 2 DIABETES MELLITUS WITHOUT COMPLICATION, WITH LONG-TERM CURRENT USE OF INSULIN: ICD-10-CM

## 2019-08-27 DIAGNOSIS — E11.9 CONTROLLED TYPE 2 DIABETES MELLITUS WITHOUT COMPLICATION, WITH LONG-TERM CURRENT USE OF INSULIN: ICD-10-CM

## 2019-08-27 RX ORDER — INSULIN DETEMIR 100 [IU]/ML
INJECTION, SOLUTION SUBCUTANEOUS
Qty: 15 ML | Refills: 0 | Status: SHIPPED | OUTPATIENT
Start: 2019-08-27 | End: 2020-02-18 | Stop reason: SDUPTHER

## 2019-08-27 NOTE — PROGRESS NOTES
Outreach to pt RE: schedule follow up w/ PCP. [facility] message sent w/ schedule link. Chart review completed. Pt is due for DM labs, eye screening & foot exam.

## 2019-10-15 ENCOUNTER — OFFICE VISIT (OUTPATIENT)
Dept: PSYCHIATRY | Facility: CLINIC | Age: 44
End: 2019-10-15
Payer: COMMERCIAL

## 2019-10-15 DIAGNOSIS — F33.41 DEPRESSION, MAJOR, RECURRENT, IN PARTIAL REMISSION: Primary | ICD-10-CM

## 2019-10-15 DIAGNOSIS — F41.1 GAD (GENERALIZED ANXIETY DISORDER): ICD-10-CM

## 2019-10-15 PROCEDURE — 90834 PSYTX W PT 45 MINUTES: CPT | Mod: S$GLB,,, | Performed by: SOCIAL WORKER

## 2019-10-15 PROCEDURE — 90834 PR PSYCHOTHERAPY W/PATIENT, 45 MIN: ICD-10-PCS | Mod: S$GLB,,, | Performed by: SOCIAL WORKER

## 2019-10-15 NOTE — PROGRESS NOTES
"Individual Psychotherapy (PhD/LCSW)    10/15/2019    Site:  [facility]         Therapeutic Intervention: Met with patient.  Outpatient - Insight oriented psychotherapy 45 min - CPT code 73694    Chief complaint/reason for encounter: depression     Interval history and content of current session:      He reports that he always had burst of energy but the depression began about 6 years ago.   Starts drinking at 19 and stops around 35.         "I never sleep good".   Last few nights 6 hours "which is a lot for me".     Still smokes cannabis.  No alcohol.      Mostly anxious last few weeks.   Cognitive therapy introduced.  Examined the thought that he was going to get fired without much evidence.  She tends to isolate when down.  Did talk to wife for the two days he felt down.   Encouraged to keep a mood log and record a thought per day.         Treatment plan:  · Target symptoms: depression, anxiety   · Why chosen therapy is appropriate versus another modality: relevant to diagnosis, evidence based practice  · Outcome monitoring methods: self-report, observation  · Therapeutic intervention type: insight oriented psychotherapy, behavior modifying psychotherapy, supportive psychotherapy    Risk parameters:  Patient reports no suicidal ideation  Patient reports no homicidal ideation  Patient reports no self-injurious behavior  Patient reports no violent behavior    Verbal deficits: None    Patient's response to intervention:  The patient's response to intervention is accepting.    Progress toward goals and other mental status changes:  The patient's progress toward goals is fair .    Diagnosis:     ICD-10-CM ICD-9-CM   1. Depression, major, recurrent, in partial remission F33.41 296.35   2. MÓNICA (generalized anxiety disorder) F41.1 300.02       Plan:  individual psychotherapy    Return to clinic: as scheduled    Length of Service (minutes): 45      "

## 2019-11-11 DIAGNOSIS — E11.9 CONTROLLED TYPE 2 DIABETES MELLITUS WITHOUT COMPLICATION, WITH LONG-TERM CURRENT USE OF INSULIN: ICD-10-CM

## 2019-11-11 DIAGNOSIS — Z79.4 CONTROLLED TYPE 2 DIABETES MELLITUS WITHOUT COMPLICATION, WITH LONG-TERM CURRENT USE OF INSULIN: ICD-10-CM

## 2020-02-17 ENCOUNTER — LAB VISIT (OUTPATIENT)
Dept: LAB | Facility: HOSPITAL | Age: 45
End: 2020-02-17
Attending: INTERNAL MEDICINE
Payer: COMMERCIAL

## 2020-02-17 ENCOUNTER — OFFICE VISIT (OUTPATIENT)
Dept: INTERNAL MEDICINE | Facility: CLINIC | Age: 45
End: 2020-02-17
Payer: COMMERCIAL

## 2020-02-17 VITALS
HEIGHT: 69 IN | HEART RATE: 60 BPM | BODY MASS INDEX: 25.34 KG/M2 | DIASTOLIC BLOOD PRESSURE: 70 MMHG | SYSTOLIC BLOOD PRESSURE: 110 MMHG | WEIGHT: 171.06 LBS

## 2020-02-17 DIAGNOSIS — E11.9 CONTROLLED TYPE 2 DIABETES MELLITUS WITHOUT COMPLICATION, WITH LONG-TERM CURRENT USE OF INSULIN: ICD-10-CM

## 2020-02-17 DIAGNOSIS — G56.01 CARPAL TUNNEL SYNDROME OF RIGHT WRIST: ICD-10-CM

## 2020-02-17 DIAGNOSIS — Z11.3 ROUTINE SCREENING FOR STI (SEXUALLY TRANSMITTED INFECTION): ICD-10-CM

## 2020-02-17 DIAGNOSIS — Z79.4 CONTROLLED TYPE 2 DIABETES MELLITUS WITHOUT COMPLICATION, WITH LONG-TERM CURRENT USE OF INSULIN: Primary | ICD-10-CM

## 2020-02-17 DIAGNOSIS — E11.9 CONTROLLED TYPE 2 DIABETES MELLITUS WITHOUT COMPLICATION, WITH LONG-TERM CURRENT USE OF INSULIN: Primary | ICD-10-CM

## 2020-02-17 DIAGNOSIS — Z79.4 CONTROLLED TYPE 2 DIABETES MELLITUS WITHOUT COMPLICATION, WITH LONG-TERM CURRENT USE OF INSULIN: ICD-10-CM

## 2020-02-17 DIAGNOSIS — F17.200 SMOKER: ICD-10-CM

## 2020-02-17 DIAGNOSIS — J32.9 RHINOSINUSITIS: ICD-10-CM

## 2020-02-17 LAB
ALBUMIN SERPL BCP-MCNC: 4 G/DL (ref 3.5–5.2)
ALBUMIN/CREAT UR: NORMAL UG/MG (ref 0–30)
ALP SERPL-CCNC: 81 U/L (ref 55–135)
ALT SERPL W/O P-5'-P-CCNC: 26 U/L (ref 10–44)
ANION GAP SERPL CALC-SCNC: 7 MMOL/L (ref 8–16)
AST SERPL-CCNC: 25 U/L (ref 10–40)
BILIRUB SERPL-MCNC: 0.3 MG/DL (ref 0.1–1)
BUN SERPL-MCNC: 12 MG/DL (ref 6–20)
CALCIUM SERPL-MCNC: 9.8 MG/DL (ref 8.7–10.5)
CHLORIDE SERPL-SCNC: 99 MMOL/L (ref 95–110)
CHOLEST SERPL-MCNC: 217 MG/DL (ref 120–199)
CHOLEST/HDLC SERPL: 5.4 {RATIO} (ref 2–5)
CO2 SERPL-SCNC: 30 MMOL/L (ref 23–29)
CREAT SERPL-MCNC: 1.2 MG/DL (ref 0.5–1.4)
CREAT UR-MCNC: 68 MG/DL (ref 23–375)
EST. GFR  (AFRICAN AMERICAN): >60 ML/MIN/1.73 M^2
EST. GFR  (NON AFRICAN AMERICAN): >60 ML/MIN/1.73 M^2
ESTIMATED AVG GLUCOSE: 186 MG/DL (ref 68–131)
GLUCOSE SERPL-MCNC: 212 MG/DL (ref 70–110)
HBA1C MFR BLD HPLC: 8.1 % (ref 4–5.6)
HDLC SERPL-MCNC: 40 MG/DL (ref 40–75)
HDLC SERPL: 18.4 % (ref 20–50)
LDLC SERPL CALC-MCNC: 137.4 MG/DL (ref 63–159)
MICROALBUMIN UR DL<=1MG/L-MCNC: <2.5 UG/ML
NONHDLC SERPL-MCNC: 177 MG/DL
POTASSIUM SERPL-SCNC: 5.7 MMOL/L (ref 3.5–5.1)
PROT SERPL-MCNC: 7.3 G/DL (ref 6–8.4)
SODIUM SERPL-SCNC: 136 MMOL/L (ref 136–145)
TRIGL SERPL-MCNC: 198 MG/DL (ref 30–150)

## 2020-02-17 PROCEDURE — 80053 COMPREHEN METABOLIC PANEL: CPT

## 2020-02-17 PROCEDURE — 86703 HIV-1/HIV-2 1 RESULT ANTBDY: CPT

## 2020-02-17 PROCEDURE — 36415 COLL VENOUS BLD VENIPUNCTURE: CPT

## 2020-02-17 PROCEDURE — 99999 PR PBB SHADOW E&M-EST. PATIENT-LVL III: CPT | Mod: PBBFAC,,, | Performed by: INTERNAL MEDICINE

## 2020-02-17 PROCEDURE — 80061 LIPID PANEL: CPT

## 2020-02-17 PROCEDURE — 99214 OFFICE O/P EST MOD 30 MIN: CPT | Mod: S$GLB,,, | Performed by: INTERNAL MEDICINE

## 2020-02-17 PROCEDURE — 99999 PR PBB SHADOW E&M-EST. PATIENT-LVL III: ICD-10-PCS | Mod: PBBFAC,,, | Performed by: INTERNAL MEDICINE

## 2020-02-17 PROCEDURE — 83036 HEMOGLOBIN GLYCOSYLATED A1C: CPT

## 2020-02-17 PROCEDURE — 82570 ASSAY OF URINE CREATININE: CPT

## 2020-02-17 PROCEDURE — 99214 PR OFFICE/OUTPT VISIT, EST, LEVL IV, 30-39 MIN: ICD-10-PCS | Mod: S$GLB,,, | Performed by: INTERNAL MEDICINE

## 2020-02-17 PROCEDURE — 3008F PR BODY MASS INDEX (BMI) DOCUMENTED: ICD-10-PCS | Mod: CPTII,S$GLB,, | Performed by: INTERNAL MEDICINE

## 2020-02-17 PROCEDURE — 3008F BODY MASS INDEX DOCD: CPT | Mod: CPTII,S$GLB,, | Performed by: INTERNAL MEDICINE

## 2020-02-17 NOTE — PROGRESS NOTES
Subjective:       Patient ID: Pierre Edmonds Jr. is a 44 y.o. male.    Chief Complaint: Numbness (right arm)    HPI - Mr. Edmonds hasn't been to see me in 2.5 years.  BS this am was 150Has had numbness and tingling from above right elbow to hand.  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  Due for a lot of diabetic health maintenance.  Still smoking cigarettes.  Has seasonal AR and wants prescription claritin-D.      PMH:  DM2, well controlled  Depression and anxiety, improving  Back and neck pain with multiple HNPs, now resolved  Cigarette smoker  IBS  GERD     Meds:  Reviewed and reconciled in EPIC with patient during visit today.     Review of Systems   Constitutional: Negative for fever.   HENT: Positive for congestion and postnasal drip.    Respiratory: Positive for cough.    Cardiovascular: Negative for chest pain.   Gastrointestinal: Negative for abdominal pain.   Musculoskeletal: Negative for arthralgias.   Skin: Negative for rash.   Neurological: Positive for numbness.   Psychiatric/Behavioral: Negative for sleep disturbance.       Objective:      Physical Exam   Constitutional: He is oriented to person, place, and time. He appears well-developed and well-nourished. No distress.   Lean, well-appearing man in no distress   HENT:   Head: Normocephalic and atraumatic.   Cardiovascular: Normal rate, regular rhythm and normal heart sounds. Exam reveals no gallop and no friction rub.   No murmur heard.  Pulmonary/Chest: No respiratory distress. He has no wheezes. He has no rales. He exhibits no tenderness.   Neurological: He is alert and oriented to person, place, and time.   Skin: Skin is warm. He is not diaphoretic. No erythema.   Psychiatric: He has a normal mood and affect. Thought content normal.   Nursing note and vitals reviewed.      Assessment:       1. Controlled type 2 diabetes mellitus without complication, with long-term current use of insulin    2. Smoker    3. Carpal tunnel syndrome of right  wrist    4. Rhinosinusitis    5. Routine screening for STI (sexually transmitted infection)        Plan:       Pierre was seen today for numbness.    Diagnoses and all orders for this visit:    Controlled type 2 diabetes mellitus without complication, with long-term current use of insulin - has been at goal, but no labs in a long time.  Due for a1c and other lab work  -     Microalbumin/creatinine urine ratio  -     Comprehensive metabolic panel; Future  -     Lipid panel; Future  -     Hemoglobin A1c; Future    Smoker - encouraged cessation    Carpal tunnel syndrome of right wrist - history and exam most c/w CTS.  Will give a brace and do an EMG  -     EMG W/ ULTRASOUND AND NERVE CONDUCTION TEST 1 Extremity; Future  -     arm brace Misc; Wear on right wrist at night    Rhinosinusitis - unstable.  Will try a prescription to see if his insurance will cover  -     loratadine-pseudoephedrine  mg (CLARITIN-D 24-HOUR)  mg per 24 hr tablet; Take 1 tablet by mouth once daily. for 10 days    Routine screening for STI (sexually transmitted infection)  -     HIV 1/2 Ag/Ab (4th Gen); Future    rtc prn, or in 6 months    TERENCE Sanchez MD MPH  Staff Internist

## 2020-02-18 ENCOUNTER — PATIENT MESSAGE (OUTPATIENT)
Dept: INTERNAL MEDICINE | Facility: CLINIC | Age: 45
End: 2020-02-18

## 2020-02-18 DIAGNOSIS — E11.9 CONTROLLED TYPE 2 DIABETES MELLITUS WITHOUT COMPLICATION, WITH LONG-TERM CURRENT USE OF INSULIN: ICD-10-CM

## 2020-02-18 DIAGNOSIS — Z79.4 CONTROLLED TYPE 2 DIABETES MELLITUS WITHOUT COMPLICATION, WITH LONG-TERM CURRENT USE OF INSULIN: ICD-10-CM

## 2020-02-18 LAB — HIV 1+2 AB+HIV1 P24 AG SERPL QL IA: NEGATIVE

## 2020-06-26 ENCOUNTER — TELEPHONE (OUTPATIENT)
Dept: NEUROLOGY | Facility: CLINIC | Age: 45
End: 2020-06-26

## 2020-08-03 ENCOUNTER — PATIENT OUTREACH (OUTPATIENT)
Dept: ADMINISTRATIVE | Facility: HOSPITAL | Age: 45
End: 2020-08-03

## 2020-08-03 DIAGNOSIS — E11.9 CONTROLLED TYPE 2 DIABETES MELLITUS WITHOUT COMPLICATION, WITH LONG-TERM CURRENT USE OF INSULIN: Primary | ICD-10-CM

## 2020-08-03 DIAGNOSIS — Z79.4 CONTROLLED TYPE 2 DIABETES MELLITUS WITHOUT COMPLICATION, WITH LONG-TERM CURRENT USE OF INSULIN: Primary | ICD-10-CM

## 2020-08-03 NOTE — PROGRESS NOTES
Health Maintenance Due   Topic Date Due    TETANUS VACCINE  07/26/1994    Low Dose Statin  07/26/1997    Foot Exam  06/02/2018    Eye Exam  09/08/2018     Order placed for diabetic eye screening photo.  Portal message has been sent to patient regarding overdue eye exam.  Chart review completed.

## 2020-08-17 ENCOUNTER — LAB VISIT (OUTPATIENT)
Dept: LAB | Facility: HOSPITAL | Age: 45
End: 2020-08-17
Attending: INTERNAL MEDICINE
Payer: COMMERCIAL

## 2020-08-17 ENCOUNTER — OFFICE VISIT (OUTPATIENT)
Dept: INTERNAL MEDICINE | Facility: CLINIC | Age: 45
End: 2020-08-17
Payer: COMMERCIAL

## 2020-08-17 VITALS
SYSTOLIC BLOOD PRESSURE: 110 MMHG | HEIGHT: 69 IN | OXYGEN SATURATION: 95 % | DIASTOLIC BLOOD PRESSURE: 70 MMHG | BODY MASS INDEX: 24.42 KG/M2 | WEIGHT: 164.88 LBS | HEART RATE: 54 BPM

## 2020-08-17 DIAGNOSIS — F33.0 MAJOR DEPRESSIVE DISORDER, RECURRENT EPISODE, MILD: ICD-10-CM

## 2020-08-17 DIAGNOSIS — F17.200 SMOKER: ICD-10-CM

## 2020-08-17 DIAGNOSIS — Z79.4 CONTROLLED TYPE 2 DIABETES MELLITUS WITHOUT COMPLICATION, WITH LONG-TERM CURRENT USE OF INSULIN: ICD-10-CM

## 2020-08-17 DIAGNOSIS — E11.9 CONTROLLED TYPE 2 DIABETES MELLITUS WITHOUT COMPLICATION, WITH LONG-TERM CURRENT USE OF INSULIN: ICD-10-CM

## 2020-08-17 DIAGNOSIS — Z79.4 CONTROLLED TYPE 2 DIABETES MELLITUS WITHOUT COMPLICATION, WITH LONG-TERM CURRENT USE OF INSULIN: Primary | ICD-10-CM

## 2020-08-17 DIAGNOSIS — E11.9 CONTROLLED TYPE 2 DIABETES MELLITUS WITHOUT COMPLICATION, WITH LONG-TERM CURRENT USE OF INSULIN: Primary | ICD-10-CM

## 2020-08-17 LAB
ALBUMIN SERPL BCP-MCNC: 4.4 G/DL (ref 3.5–5.2)
ALBUMIN/CREAT UR: NORMAL UG/MG (ref 0–30)
ALP SERPL-CCNC: 72 U/L (ref 55–135)
ALT SERPL W/O P-5'-P-CCNC: 25 U/L (ref 10–44)
ANION GAP SERPL CALC-SCNC: 8 MMOL/L (ref 8–16)
AST SERPL-CCNC: 19 U/L (ref 10–40)
BILIRUB SERPL-MCNC: 0.3 MG/DL (ref 0.1–1)
BUN SERPL-MCNC: 12 MG/DL (ref 6–20)
CALCIUM SERPL-MCNC: 10.3 MG/DL (ref 8.7–10.5)
CHLORIDE SERPL-SCNC: 103 MMOL/L (ref 95–110)
CO2 SERPL-SCNC: 29 MMOL/L (ref 23–29)
CREAT SERPL-MCNC: 1.1 MG/DL (ref 0.5–1.4)
CREAT UR-MCNC: 34 MG/DL (ref 23–375)
EST. GFR  (AFRICAN AMERICAN): >60 ML/MIN/1.73 M^2
EST. GFR  (NON AFRICAN AMERICAN): >60 ML/MIN/1.73 M^2
GLUCOSE SERPL-MCNC: 274 MG/DL (ref 70–110)
MICROALBUMIN UR DL<=1MG/L-MCNC: <2.5 UG/ML
POTASSIUM SERPL-SCNC: 5.6 MMOL/L (ref 3.5–5.1)
PROT SERPL-MCNC: 7.6 G/DL (ref 6–8.4)
SODIUM SERPL-SCNC: 140 MMOL/L (ref 136–145)

## 2020-08-17 PROCEDURE — 83036 HEMOGLOBIN GLYCOSYLATED A1C: CPT

## 2020-08-17 PROCEDURE — 99214 PR OFFICE/OUTPT VISIT, EST, LEVL IV, 30-39 MIN: ICD-10-PCS | Mod: S$GLB,,, | Performed by: INTERNAL MEDICINE

## 2020-08-17 PROCEDURE — 3052F HG A1C>EQUAL 8.0%<EQUAL 9.0%: CPT | Mod: CPTII,S$GLB,, | Performed by: INTERNAL MEDICINE

## 2020-08-17 PROCEDURE — 36415 COLL VENOUS BLD VENIPUNCTURE: CPT

## 2020-08-17 PROCEDURE — 3008F BODY MASS INDEX DOCD: CPT | Mod: CPTII,S$GLB,, | Performed by: INTERNAL MEDICINE

## 2020-08-17 PROCEDURE — 99999 PR PBB SHADOW E&M-EST. PATIENT-LVL IV: ICD-10-PCS | Mod: PBBFAC,,, | Performed by: INTERNAL MEDICINE

## 2020-08-17 PROCEDURE — 99214 OFFICE O/P EST MOD 30 MIN: CPT | Mod: S$GLB,,, | Performed by: INTERNAL MEDICINE

## 2020-08-17 PROCEDURE — 80053 COMPREHEN METABOLIC PANEL: CPT

## 2020-08-17 PROCEDURE — 3052F PR MOST RECENT HEMOGLOBIN A1C LEVEL 8.0 - < 9.0%: ICD-10-PCS | Mod: CPTII,S$GLB,, | Performed by: INTERNAL MEDICINE

## 2020-08-17 PROCEDURE — 3008F PR BODY MASS INDEX (BMI) DOCUMENTED: ICD-10-PCS | Mod: CPTII,S$GLB,, | Performed by: INTERNAL MEDICINE

## 2020-08-17 PROCEDURE — 82043 UR ALBUMIN QUANTITATIVE: CPT

## 2020-08-17 PROCEDURE — 99999 PR PBB SHADOW E&M-EST. PATIENT-LVL IV: CPT | Mod: PBBFAC,,, | Performed by: INTERNAL MEDICINE

## 2020-08-17 NOTE — PROGRESS NOTES
Subjective:       Patient ID: Pierre Edmonds Jr. is a 45 y.o. male.    Chief Complaint:   Follow-up    HPI - Mr. Edmonds feels well.  He stopped eating regularly a few weeks ago (only three times per week), and stopped his insulin.  Now eating again and back on the insulin.  Still smoking cigarettes.  Not a drinker.  Sexually active with one female partner.  Occasional marijuana use.  Work has been affected by COVID (convention/tourism).    PMH:  DM2, well controlled  Depression and anxiety  Back and neck pain with multiple HNPs, now resolved  Cigarette smoker  IBS  GERD     Meds:  Reviewed and reconciled in EPIC with patient during visit today.     Review of Systems   Constitutional: Negative for fever.   HENT: Negative for congestion.    Respiratory: Negative for shortness of breath.    Cardiovascular: Negative for chest pain.   Gastrointestinal: Negative for abdominal pain.   Genitourinary: Negative for difficulty urinating.   Musculoskeletal: Positive for arthralgias.   Skin: Negative for rash.   Neurological: Negative for headaches.   Psychiatric/Behavioral: Negative for sleep disturbance.       Objective:      Physical Exam  Constitutional:       General: He is not in acute distress.     Appearance: He is well-developed. He is not diaphoretic.      Comments: Lean, well-appearing man.  Tattoos   HENT:      Head: Normocephalic and atraumatic.   Cardiovascular:      Rate and Rhythm: Normal rate and regular rhythm.      Pulses:           Dorsalis pedis pulses are 2+ on the right side and 2+ on the left side.      Heart sounds: Normal heart sounds. No murmur. No friction rub. No gallop.    Pulmonary:      Effort: No respiratory distress.      Breath sounds: No wheezing or rales.   Chest:      Chest wall: No tenderness.   Musculoskeletal:      Right foot: No deformity.      Left foot: No deformity.   Feet:      Right foot:      Protective Sensation: 4 sites tested. 4 sites sensed.      Skin integrity: No ulcer,  blister or skin breakdown.      Toenail Condition: Right toenails are normal.      Left foot:      Protective Sensation: 4 sites tested. 4 sites sensed.      Skin integrity: No ulcer, blister or skin breakdown.      Toenail Condition: Left toenails are normal.   Skin:     General: Skin is warm.      Findings: No erythema.   Neurological:      Mental Status: He is alert and oriented to person, place, and time.   Psychiatric:         Thought Content: Thought content normal.         Assessment:       1. Controlled type 2 diabetes mellitus without complication, with long-term current use of insulin    2. Smoker    3. Major depressive disorder, recurrent episode, mild        Plan:       Pierre was seen today for follow-up.    Diagnoses and all orders for this visit:    Controlled type 2 diabetes mellitus without complication, with long-term current use of insulin - last a1c was over 8.  Worried that it's going to be high again b/c of nonadherence.  Encouraged him to always take some insulin, even if he's not eating regularly  -     Hemoglobin A1C; Future  -     Comprehensive metabolic panel; Future  -     Microalbumin/creatinine urine ratio  -     Diabetic Eye Screening Photo; Future    Smoker - back to smoking cessation clinic  -     Ambulatory referral/consult to Smoking Cessation Program; Future    Major depressive disorder, recurrent episode, mild - stable on treatment    rtc prn, or in 6 months    G Jayy Sanchez MD MPH  Staff Internist

## 2020-08-18 LAB
ESTIMATED AVG GLUCOSE: 157 MG/DL (ref 68–131)
HBA1C MFR BLD HPLC: 7.1 % (ref 4–5.6)

## 2020-08-25 ENCOUNTER — CLINICAL SUPPORT (OUTPATIENT)
Dept: SMOKING CESSATION | Facility: CLINIC | Age: 45
End: 2020-08-25
Payer: COMMERCIAL

## 2020-08-25 DIAGNOSIS — F17.200 NICOTINE DEPENDENCE: Primary | ICD-10-CM

## 2020-08-25 PROCEDURE — 99999 PR PBB SHADOW E&M-EST. PATIENT-LVL I: CPT | Mod: PBBFAC,,,

## 2020-08-25 PROCEDURE — 99404 PR PREVENT COUNSEL,INDIV,60 MIN: ICD-10-PCS | Mod: S$GLB,,,

## 2020-08-25 PROCEDURE — 99404 PREV MED CNSL INDIV APPRX 60: CPT | Mod: S$GLB,,,

## 2020-08-25 PROCEDURE — 99999 PR PBB SHADOW E&M-EST. PATIENT-LVL I: ICD-10-PCS | Mod: PBBFAC,,,

## 2020-08-25 RX ORDER — VARENICLINE TARTRATE 0.5 (11)-1
KIT ORAL
Qty: 53 TABLET | Refills: 0 | Status: SHIPPED | OUTPATIENT
Start: 2020-08-25 | End: 2021-04-20

## 2020-08-25 RX ORDER — DIPHENHYDRAMINE HCL 25 MG
4 CAPSULE ORAL
Qty: 200 EACH | Refills: 0 | Status: SHIPPED | OUTPATIENT
Start: 2020-08-25 | End: 2020-11-09

## 2020-08-25 NOTE — PROGRESS NOTES
Coffee and smoke routine.  Can have cigarette without lighting it.  Mid night bathroom and cig. It's catching up with him.    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

## 2020-09-01 ENCOUNTER — CLINICAL SUPPORT (OUTPATIENT)
Dept: SMOKING CESSATION | Facility: CLINIC | Age: 45
End: 2020-09-01
Payer: COMMERCIAL

## 2020-09-01 DIAGNOSIS — F17.200 NICOTINE DEPENDENCE: Primary | ICD-10-CM

## 2020-09-01 PROCEDURE — 99403 PR PREVENT COUNSEL,INDIV,45 MIN: ICD-10-PCS | Mod: S$GLB,,,

## 2020-09-01 PROCEDURE — 99999 PR PBB SHADOW E&M-EST. PATIENT-LVL I: CPT | Mod: PBBFAC,,,

## 2020-09-01 PROCEDURE — 99403 PREV MED CNSL INDIV APPRX 45: CPT | Mod: S$GLB,,,

## 2020-09-01 PROCEDURE — 99999 PR PBB SHADOW E&M-EST. PATIENT-LVL I: ICD-10-PCS | Mod: PBBFAC,,,

## 2020-09-09 ENCOUNTER — CLINICAL SUPPORT (OUTPATIENT)
Dept: SMOKING CESSATION | Facility: CLINIC | Age: 45
End: 2020-09-09
Payer: COMMERCIAL

## 2020-09-09 DIAGNOSIS — F17.200 NICOTINE DEPENDENCE: Primary | ICD-10-CM

## 2020-09-09 PROCEDURE — 99402 PR PREVENT COUNSEL,INDIV,30 MIN: ICD-10-PCS | Mod: S$GLB,,,

## 2020-09-09 PROCEDURE — 99402 PREV MED CNSL INDIV APPRX 30: CPT | Mod: S$GLB,,,

## 2020-09-09 NOTE — PROGRESS NOTES
Individual Follow-Up Form    9/9/2020    Quit Date:     Clinical Status of Patient: Outpatient    Length of Service: 30 minutes    Continuing Medication: yes  Nicotine gum    Other Medications:      Target Symptoms: Withdrawal and medication side effects. The following were  rated moderate (3) to severe (4) on TCRS:  · Moderate (3): none  · Severe (4): none    Comments: completion of TCRS (Tobacco Cessation Rating Scale) reviewed strategies, controlling environment, cues, triggers, new goals set. Introduced high risk situations with preparation interventions, caffeine similarities with withdrawal issues of habit and nicotine, Alcohol, Understanding urges, cravings, stress and relaxation. Open discussion with intervention discussion.  Pt started with gum today and liked it and said it will help. Pt going to practice delays and use gum.  Pt wants to be at a pack or less per day before starting CX.  Currently at 22-24 per day.  Pt states would like to be at 15 per day next time we talk.      Diagnosis: F17.210    Next Visit: 2 weeks

## 2020-09-23 ENCOUNTER — CLINICAL SUPPORT (OUTPATIENT)
Dept: SMOKING CESSATION | Facility: CLINIC | Age: 45
End: 2020-09-23
Payer: COMMERCIAL

## 2020-09-23 DIAGNOSIS — F17.200 NICOTINE DEPENDENCE: Primary | ICD-10-CM

## 2020-09-23 PROCEDURE — 99402 PREV MED CNSL INDIV APPRX 30: CPT | Mod: S$GLB,,,

## 2020-09-23 PROCEDURE — 99999 PR PBB SHADOW E&M-EST. PATIENT-LVL I: ICD-10-PCS | Mod: PBBFAC,,,

## 2020-09-23 PROCEDURE — 99402 PR PREVENT COUNSEL,INDIV,30 MIN: ICD-10-PCS | Mod: S$GLB,,,

## 2020-09-23 PROCEDURE — 99999 PR PBB SHADOW E&M-EST. PATIENT-LVL I: CPT | Mod: PBBFAC,,,

## 2020-09-23 NOTE — PROGRESS NOTES
Individual Follow-Up Form    9/23/2020    Quit Date:     Clinical Status of Patient: Outpatient    Length of Service: 30 minutes    Continuing Medication: yes  Nicotine gum    Other Medications:      Target Symptoms: Withdrawal and medication side effects. The following were  rated moderate (3) to severe (4) on TCRS:  · Moderate (3): none  · Severe (4): none    Comments: completion of TCRS (Tobacco Cessation Rating Scale) reviewed strategies, cues, and triggers. Introduced the negative impact of tobacco on health, the health advantages of discontinuing the use of tobacco, time line improved health changes after a quit, withdrawal issues to expect from nicotine and habit, and ways to achieve the goal of a quit. Only smoking with urges.  Not smoking after meals.  Trying for 1 per hour.   34 bad day fighting with Ex and stayed up 20 hours.   Averaging 22-24.  Will  Try to use gum more; was not keeping in work truck - will put in bag carries with him. Smoking less on drive to  - used to be 3-4, now only 1.  Still buying cartons.  Discussed buying individual packs.   States less mindless smoking.    Diagnosis: F17.210    Next Visit: 2 weeks

## 2020-10-07 ENCOUNTER — TELEPHONE (OUTPATIENT)
Dept: SMOKING CESSATION | Facility: CLINIC | Age: 45
End: 2020-10-07

## 2020-10-07 NOTE — TELEPHONE ENCOUNTER
Called pt when did not show up at scheduled time.  Left message asking him to call me.  Let him know I'd put him back on schedule in next one to two weeks.

## 2020-10-27 ENCOUNTER — CLINICAL SUPPORT (OUTPATIENT)
Dept: SMOKING CESSATION | Facility: CLINIC | Age: 45
End: 2020-10-27
Payer: COMMERCIAL

## 2020-10-27 DIAGNOSIS — F17.200 NICOTINE DEPENDENCE: Primary | ICD-10-CM

## 2020-10-27 PROCEDURE — 99999 PR PBB SHADOW E&M-EST. PATIENT-LVL I: ICD-10-PCS | Mod: PBBFAC,,,

## 2020-10-27 PROCEDURE — 99999 PR PBB SHADOW E&M-EST. PATIENT-LVL I: CPT | Mod: PBBFAC,,,

## 2020-10-27 NOTE — PROGRESS NOTES
Individual Follow-Up Form    10/29/2020    Quit Date:     Clinical Status of Patient: Outpatient    Length of Service: 60 minutes    Continuing Medication: yes  Chantix    Other Medications:      Target Symptoms: Withdrawal and medication side effects. The following were  rated moderate (3) to severe (4) on TCRS:  · Moderate (3): none  · Severe (4): none    Comments: completion of TCRS (Tobacco Cessation Rating Scale) reviewed strategies, cues, and triggers. Introduced the negative impact of tobacco on health, the health advantages of discontinuing the use of tobacco, time line improved health changes after a quit, withdrawal issues to expect from nicotine and habit, and ways to achieve the goal of a quit. Came in with daughter on 10.28.2020  Nov 1 is reset date for tricks that work.  Being busier at work and daughter back to school.  Will start CX at 25 -30 cigs per day. In person apts work the best.  Motivating factors: difficulty breathing, wheezing by end of day.    Diagnosis: F17.210    Next Visit: 2 weeks

## 2020-11-09 ENCOUNTER — CLINICAL SUPPORT (OUTPATIENT)
Dept: SMOKING CESSATION | Facility: CLINIC | Age: 45
End: 2020-11-09
Payer: COMMERCIAL

## 2020-11-09 DIAGNOSIS — F17.200 NICOTINE DEPENDENCE: Primary | ICD-10-CM

## 2020-11-09 PROCEDURE — 99402 PR PREVENT COUNSEL,INDIV,30 MIN: ICD-10-PCS | Mod: S$GLB,,,

## 2020-11-09 PROCEDURE — 99402 PREV MED CNSL INDIV APPRX 30: CPT | Mod: S$GLB,,,

## 2020-11-09 RX ORDER — MICONAZOLE NITRATE 2 %
2 CREAM (GRAM) TOPICAL
Qty: 190 EACH | Refills: 0 | Status: SHIPPED | OUTPATIENT
Start: 2020-11-09 | End: 2021-11-04 | Stop reason: SDUPTHER

## 2020-11-09 NOTE — PROGRESS NOTES
Individual Follow-Up Form    11/9/2020    Quit Date:     Clinical Status of Patient: Outpatient    Length of Service: 45 minutes    Continuing Medication: no    Other Medications:      Target Symptoms: Withdrawal and medication side effects. The following were  rated moderate (3) to severe (4) on TCRS:  · Moderate (3): none  · Severe (4): none    Comments: completion of TCRS (Tobacco Cessation Rating Scale) reviewed strategies, habitual behavior, stress, and high risk situations. Introduced stress with addition interventions, SOLVE, relaxation with interventions, nutrition, exercise, weight gain, and the importance of rewarding oneself for accomplishments toward becoming tobacco free. Open discussion of all items with interventions. Will start CX week of TG katheryn daughter not sleeping there. Make sure won't give sleep walking issues. Smokes so much that does not have urges. Smoking a lot out of boredom      Diagnosis: F17.210    Next Visit: 1 week

## 2020-12-04 ENCOUNTER — PATIENT OUTREACH (OUTPATIENT)
Dept: ADMINISTRATIVE | Facility: OTHER | Age: 45
End: 2020-12-04

## 2020-12-08 ENCOUNTER — OFFICE VISIT (OUTPATIENT)
Dept: DERMATOLOGY | Facility: CLINIC | Age: 45
End: 2020-12-08
Payer: COMMERCIAL

## 2020-12-08 DIAGNOSIS — B07.8 OTHER VIRAL WARTS: Primary | ICD-10-CM

## 2020-12-08 PROCEDURE — 17110 PR DESTRUCTION BENIGN LESIONS UP TO 14: ICD-10-PCS | Mod: S$GLB,,, | Performed by: DERMATOLOGY

## 2020-12-08 PROCEDURE — 99499 UNLISTED E&M SERVICE: CPT | Mod: S$GLB,,, | Performed by: DERMATOLOGY

## 2020-12-08 PROCEDURE — 99999 PR PBB SHADOW E&M-EST. PATIENT-LVL III: ICD-10-PCS | Mod: PBBFAC,,, | Performed by: DERMATOLOGY

## 2020-12-08 PROCEDURE — 99999 PR PBB SHADOW E&M-EST. PATIENT-LVL III: CPT | Mod: PBBFAC,,, | Performed by: DERMATOLOGY

## 2020-12-08 PROCEDURE — 99499 NO LOS: ICD-10-PCS | Mod: S$GLB,,, | Performed by: DERMATOLOGY

## 2020-12-08 PROCEDURE — 17110 DESTRUCTION B9 LES UP TO 14: CPT | Mod: S$GLB,,, | Performed by: DERMATOLOGY

## 2020-12-08 NOTE — PATIENT INSTRUCTIONS
CRYOSURGERY      Your doctor has used a method called cryosurgery to treat your skin condition. Cryosurgery refers to the use of very cold substances to treat a variety of skin conditions such as warts, pre-skin cancers, molluscum contagiosum, sun spots, and several benign growths. The substance we use in cryosurgery is liquid nitrogen and is so cold (-195 degrees Celsius) that is burns when administered.     Following treatment in the office, the skin may immediately burn and become red. You may find the area around the lesion is affected as well. It is sometimes necessary to treat not only the lesion, but a small area of the surrounding normal skin to achieve a good response.     A blister, and even a blood filled blister, may form after treatment.   This is a normal response. If the blister is painful, it is acceptable to sterilize a needle and with rubbing alcohol and gently pop the blister. It is important that you gently wash the area with soap and warm water as the blister fluid may contain wart virus if a wart was treated. Do no remove the roof of the blister.     The area treated can take anywhere from 1-3 weeks to heal. Healing time depends on the kind skin lesion treated, the location, and how aggressively the lesion was treated. It is recommended that the areas treated are covered with Vaseline or bacitracin ointment and a band-aid. If a band-aid is not practical, just ointment applied several times per day will do. Keeping these areas moist will speed the healing time.    Treatment with liquid nitrogen can leave a scar. In dark skin, it may be a light or dark scar, in light skin it may be a white or pink scar. These will generally fade with time, but may never go away completely.     If you have any concerns after your treatment, please feel free to call the office.       [address] [facility] [place] [address]/ [phone] FAX/ [email or web address]    Summer Sun Protection      The  [facility] would like to remind you of the importance of sun protection all year round and particularly during the summer when the suns rays are the strongest. It has been proven that both acute and chronic sun exposure damages our cells and leads to skin cancer. Beyond skin cancer, the sun causes 90% of the symptoms of pre-mature skin aging, including wrinkles, lentigines (brown spots), and thin, easily bruised skin. Proper sun protection can help prevent these unwanted conditions.    Many patients report that the dont go in the sun. It has been shown that the average person receives 18 hours of incidental sun exposure per week during activities such as walking through parking lots, driving, or sitting next to windows. This accumulates to several bad sunburns per year!    In choosing sunscreen, you want one that protects against both UVA and UVB rays. It is recommended that you use one of SPF 30 or higher. It is important to apply the sunscreen about 20 minutes prior to sun exposure. Most sunscreens are chemical sunscreens and a reaction must take place in the skin so that they are effective. If they are applied and then you are immediately exposed to the sun or start sweating, this reaction has not had time to take place and you are therefore unprotected. Sunscreen needs to be reapplied every 2 hours if you are participating in water sports or sweating. We recommend Elta MD or Neutrogena Ultra Sheer Dry Touch SPF 55 for daily use; however there are many options and it is most important for you to find one that you will use on a consistent basis.    If you have sensitive skin, you may do best with a sunscreen that contains only physical blockers such as titanium dioxide or zinc oxide. These are typically thicker and harder to apply, however they afford very good protection. Neutrogena Sensitive Skin, Blue Lizard Sensitive Skin (pink top) or Neutrogena Pure and Free are popular ones.      Aside from sunscreen, clothes with UV protection, wide brimmed hats, and sunglasses are other means of sun protection that we recommend.                        [facility]  [facility] [address]  [facility] [address]  Dept: [phone]  Dept Fax: [phone]

## 2020-12-08 NOTE — PROGRESS NOTES
Subjective:       Patient ID:  Pierre Edmonds Jr. is a 45 y.o. male who presents for   Chief Complaint   Patient presents with    Warts     ON THE RIGHT SIDE OF HIS FACE WAS REMOVED AND RETURNED ALSO ? WART ON RT LOWER ARM     HPI  Pt here today for new warts on R arm x about 6 months and R lower cheek x about a year. Asymptomatic and no prior treatment.  Has had different warts removed previously which haven't recurred.    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    Review of Systems   Constitutional: Negative.    HENT: Negative.    Eyes: Negative.    Respiratory: Negative.    Cardiovascular: Negative.    Gastrointestinal: Negative.    Genitourinary: Negative.    Musculoskeletal: Negative.    Skin: Positive for daily sunscreen use (NOT REALLY ) and wears hat (AT TIMES ).   Neurological: Negative.    Psychiatric/Behavioral: Negative.    Endocrine: Negative.    Allergic/Immunologic: Negative.         Objective:    Physical Exam   Constitutional: He appears well-developed and well-nourished.   Neurological: He is alert and oriented to person, place, and time.   Psychiatric: He has a normal mood and affect.   Skin:   Areas Examined (abnormalities noted in diagram):   Head / Face Inspection Performed  RUE Inspected                 Pt declined skin exam of any other areas today.      Diagram Legend     Erythematous scaling macule/papule c/w actinic keratosis       Vascular papule c/w angioma      Pigmented verrucoid papule/plaque c/w seborrheic keratosis      Yellow umbilicated papule c/w sebaceous hyperplasia      Irregularly shaped tan macule c/w lentigo     1-2 mm smooth white papules consistent with Milia      Movable subcutaneous cyst with punctum c/w epidermal inclusion cyst      Subcutaneous movable cyst c/w pilar cyst      Firm pink to brown papule c/w dermatofibroma      Pedunculated fleshy papule(s) c/w skin tag(s)       Evenly pigmented macule c/w junctional nevus     Mildly variegated pigmented, slightly irregular-bordered macule c/w mildly atypical nevus      Flesh colored to evenly pigmented papule c/w intradermal nevus       Pink pearly papule/plaque c/w basal cell carcinoma      Erythematous hyperkeratotic cursted plaque c/w SCC      Surgical scar with no sign of skin cancer recurrence      Open and closed comedones      Inflammatory papules and pustules      Verrucoid papule consistent consistent with wart     Erythematous eczematous patches and plaques     Dystrophic onycholytic nail with subungual debris c/w onychomycosis     Umbilicated papule    Erythematous-base heme-crusted tan verrucoid plaque consistent with inflamed seborrheic keratosis     Erythematous Silvery Scaling Plaque c/w Psoriasis     See annotation      Assessment / Plan:        Other viral warts  Cryosurgery procedure note:    Verbal consent from the patient is obtained including, but not limited to, risk of hypopigmentation/hyperpigmentation, scar, recurrence of lesion. Liquid nitrogen cryosurgery is applied to 2 lesions to produce a freeze injury. The patient is aware that blisters may form and is instructed on wound care with gentle cleansing and use of vaseline ointment to keep moist until healed. The patient is supplied a handout on cryosurgery and is instructed to call if lesions do not completely resolve.    Follow up in about 4 weeks (around 1/5/2021) for skin check or sooner for any concerns.

## 2021-03-03 DIAGNOSIS — E11.9 TYPE 2 DIABETES MELLITUS WITHOUT COMPLICATION: ICD-10-CM

## 2021-04-05 ENCOUNTER — PATIENT MESSAGE (OUTPATIENT)
Dept: ADMINISTRATIVE | Facility: HOSPITAL | Age: 46
End: 2021-04-05

## 2021-04-20 ENCOUNTER — LAB VISIT (OUTPATIENT)
Dept: LAB | Facility: HOSPITAL | Age: 46
End: 2021-04-20
Attending: INTERNAL MEDICINE
Payer: COMMERCIAL

## 2021-04-20 ENCOUNTER — OFFICE VISIT (OUTPATIENT)
Dept: INTERNAL MEDICINE | Facility: CLINIC | Age: 46
End: 2021-04-20
Payer: COMMERCIAL

## 2021-04-20 ENCOUNTER — CLINICAL SUPPORT (OUTPATIENT)
Dept: OPTOMETRY | Facility: CLINIC | Age: 46
End: 2021-04-20
Attending: INTERNAL MEDICINE
Payer: COMMERCIAL

## 2021-04-20 VITALS
DIASTOLIC BLOOD PRESSURE: 60 MMHG | OXYGEN SATURATION: 96 % | SYSTOLIC BLOOD PRESSURE: 110 MMHG | WEIGHT: 168.44 LBS | HEART RATE: 56 BPM | HEIGHT: 69 IN | BODY MASS INDEX: 24.95 KG/M2

## 2021-04-20 DIAGNOSIS — Z79.4 CONTROLLED TYPE 2 DIABETES MELLITUS WITHOUT COMPLICATION, WITH LONG-TERM CURRENT USE OF INSULIN: Primary | ICD-10-CM

## 2021-04-20 DIAGNOSIS — Z79.4 CONTROLLED TYPE 2 DIABETES MELLITUS WITHOUT COMPLICATION, WITH LONG-TERM CURRENT USE OF INSULIN: ICD-10-CM

## 2021-04-20 DIAGNOSIS — E11.9 CONTROLLED TYPE 2 DIABETES MELLITUS WITHOUT COMPLICATION, WITH LONG-TERM CURRENT USE OF INSULIN: ICD-10-CM

## 2021-04-20 DIAGNOSIS — N52.9 ERECTILE DYSFUNCTION, UNSPECIFIED ERECTILE DYSFUNCTION TYPE: ICD-10-CM

## 2021-04-20 DIAGNOSIS — F31.77 BIPOLAR DISORDER, IN PARTIAL REMISSION, MOST RECENT EPISODE MIXED: ICD-10-CM

## 2021-04-20 DIAGNOSIS — E11.9 CONTROLLED TYPE 2 DIABETES MELLITUS WITHOUT COMPLICATION, WITH LONG-TERM CURRENT USE OF INSULIN: Primary | ICD-10-CM

## 2021-04-20 DIAGNOSIS — F17.200 SMOKER: ICD-10-CM

## 2021-04-20 LAB
CHOLEST SERPL-MCNC: 167 MG/DL (ref 120–199)
CHOLEST/HDLC SERPL: 3.9 {RATIO} (ref 2–5)
ESTIMATED AVG GLUCOSE: 160 MG/DL (ref 68–131)
HBA1C MFR BLD: 7.2 % (ref 4–5.6)
HDLC SERPL-MCNC: 43 MG/DL (ref 40–75)
HDLC SERPL: 25.7 % (ref 20–50)
LDLC SERPL CALC-MCNC: 106 MG/DL (ref 63–159)
NONHDLC SERPL-MCNC: 124 MG/DL
TESTOST SERPL-MCNC: 835 NG/DL (ref 304–1227)
TRIGL SERPL-MCNC: 90 MG/DL (ref 30–150)

## 2021-04-20 PROCEDURE — 3051F HG A1C>EQUAL 7.0%<8.0%: CPT | Mod: CPTII,S$GLB,, | Performed by: INTERNAL MEDICINE

## 2021-04-20 PROCEDURE — 92228 DIABETIC EYE SCREENING PHOTO: ICD-10-PCS | Mod: TC,S$GLB,, | Performed by: INTERNAL MEDICINE

## 2021-04-20 PROCEDURE — 99999 PR PBB SHADOW E&M-EST. PATIENT-LVL III: ICD-10-PCS | Mod: PBBFAC,,, | Performed by: INTERNAL MEDICINE

## 2021-04-20 PROCEDURE — 1126F PR PAIN SEVERITY QUANTIFIED, NO PAIN PRESENT: ICD-10-PCS | Mod: S$GLB,,, | Performed by: INTERNAL MEDICINE

## 2021-04-20 PROCEDURE — 80061 LIPID PANEL: CPT | Performed by: INTERNAL MEDICINE

## 2021-04-20 PROCEDURE — 99214 PR OFFICE/OUTPT VISIT, EST, LEVL IV, 30-39 MIN: ICD-10-PCS | Mod: S$GLB,,, | Performed by: INTERNAL MEDICINE

## 2021-04-20 PROCEDURE — 3051F PR MOST RECENT HEMOGLOBIN A1C LEVEL 7.0 - < 8.0%: ICD-10-PCS | Mod: CPTII,S$GLB,, | Performed by: INTERNAL MEDICINE

## 2021-04-20 PROCEDURE — 84403 ASSAY OF TOTAL TESTOSTERONE: CPT | Performed by: INTERNAL MEDICINE

## 2021-04-20 PROCEDURE — 92228 IMG RTA DETC/MNTR DS PHY/QHP: CPT | Mod: TC,S$GLB,, | Performed by: INTERNAL MEDICINE

## 2021-04-20 PROCEDURE — 3008F PR BODY MASS INDEX (BMI) DOCUMENTED: ICD-10-PCS | Mod: CPTII,S$GLB,, | Performed by: INTERNAL MEDICINE

## 2021-04-20 PROCEDURE — 92228 DIABETIC EYE SCREENING PHOTO: ICD-10-PCS | Mod: 26,S$GLB,, | Performed by: OPHTHALMOLOGY

## 2021-04-20 PROCEDURE — 99999 PR PBB SHADOW E&M-EST. PATIENT-LVL III: CPT | Mod: PBBFAC,,, | Performed by: INTERNAL MEDICINE

## 2021-04-20 PROCEDURE — 3008F BODY MASS INDEX DOCD: CPT | Mod: CPTII,S$GLB,, | Performed by: INTERNAL MEDICINE

## 2021-04-20 PROCEDURE — 92228 IMG RTA DETC/MNTR DS PHY/QHP: CPT | Mod: 26,S$GLB,, | Performed by: OPHTHALMOLOGY

## 2021-04-20 PROCEDURE — 36415 COLL VENOUS BLD VENIPUNCTURE: CPT | Performed by: INTERNAL MEDICINE

## 2021-04-20 PROCEDURE — 83036 HEMOGLOBIN GLYCOSYLATED A1C: CPT | Performed by: INTERNAL MEDICINE

## 2021-04-20 PROCEDURE — 99214 OFFICE O/P EST MOD 30 MIN: CPT | Mod: S$GLB,,, | Performed by: INTERNAL MEDICINE

## 2021-04-20 PROCEDURE — 1126F AMNT PAIN NOTED NONE PRSNT: CPT | Mod: S$GLB,,, | Performed by: INTERNAL MEDICINE

## 2021-04-20 RX ORDER — SILDENAFIL 100 MG/1
100 TABLET, FILM COATED ORAL DAILY PRN
Qty: 5 TABLET | Refills: 6 | Status: SHIPPED | OUTPATIENT
Start: 2021-04-20 | End: 2023-06-08 | Stop reason: SDUPTHER

## 2021-04-20 RX ORDER — LURASIDONE HYDROCHLORIDE 20 MG/1
20 TABLET, FILM COATED ORAL DAILY
COMMUNITY
End: 2021-06-29 | Stop reason: SDUPTHER

## 2021-04-20 RX ORDER — SILDENAFIL 100 MG/1
100 TABLET, FILM COATED ORAL DAILY PRN
Qty: 5 TABLET | Refills: 6 | Status: SHIPPED | OUTPATIENT
Start: 2021-04-20 | End: 2021-04-20 | Stop reason: SDUPTHER

## 2021-04-27 ENCOUNTER — PATIENT MESSAGE (OUTPATIENT)
Dept: INTERNAL MEDICINE | Facility: CLINIC | Age: 46
End: 2021-04-27

## 2021-04-27 DIAGNOSIS — E11.9 CONTROLLED TYPE 2 DIABETES MELLITUS WITHOUT COMPLICATION, WITH LONG-TERM CURRENT USE OF INSULIN: Primary | ICD-10-CM

## 2021-04-27 DIAGNOSIS — Z79.4 CONTROLLED TYPE 2 DIABETES MELLITUS WITHOUT COMPLICATION, WITH LONG-TERM CURRENT USE OF INSULIN: Primary | ICD-10-CM

## 2021-04-27 RX ORDER — INSULIN PUMP SYRINGE, 3 ML
EACH MISCELLANEOUS
Qty: 1 EACH | Refills: 0 | Status: SHIPPED | OUTPATIENT
Start: 2021-04-27 | End: 2022-12-08

## 2021-04-27 RX ORDER — LANCETS
EACH MISCELLANEOUS
Qty: 200 EACH | Refills: 3 | Status: SHIPPED | OUTPATIENT
Start: 2021-04-27 | End: 2022-12-08

## 2021-04-29 ENCOUNTER — TELEPHONE (OUTPATIENT)
Dept: SMOKING CESSATION | Facility: CLINIC | Age: 46
End: 2021-04-29

## 2021-05-06 ENCOUNTER — TELEPHONE (OUTPATIENT)
Dept: SMOKING CESSATION | Facility: CLINIC | Age: 46
End: 2021-05-06

## 2021-05-11 ENCOUNTER — TELEPHONE (OUTPATIENT)
Dept: SMOKING CESSATION | Facility: CLINIC | Age: 46
End: 2021-05-11

## 2021-05-28 ENCOUNTER — TELEPHONE (OUTPATIENT)
Dept: OPHTHALMOLOGY | Facility: CLINIC | Age: 46
End: 2021-05-28

## 2021-06-07 ENCOUNTER — TELEPHONE (OUTPATIENT)
Dept: OPHTHALMOLOGY | Facility: CLINIC | Age: 46
End: 2021-06-07

## 2021-06-28 ENCOUNTER — PATIENT MESSAGE (OUTPATIENT)
Dept: INTERNAL MEDICINE | Facility: CLINIC | Age: 46
End: 2021-06-28

## 2021-06-28 DIAGNOSIS — F31.77 BIPOLAR DISORDER, IN PARTIAL REMISSION, MOST RECENT EPISODE MIXED: Primary | ICD-10-CM

## 2021-06-29 RX ORDER — LURASIDONE HYDROCHLORIDE 20 MG/1
20 TABLET, FILM COATED ORAL DAILY
Qty: 90 TABLET | Refills: 3 | Status: SHIPPED | OUTPATIENT
Start: 2021-06-29 | End: 2021-08-24 | Stop reason: SDUPTHER

## 2021-07-19 ENCOUNTER — PATIENT MESSAGE (OUTPATIENT)
Dept: INTERNAL MEDICINE | Facility: CLINIC | Age: 46
End: 2021-07-19

## 2021-07-21 ENCOUNTER — PATIENT OUTREACH (OUTPATIENT)
Dept: ADMINISTRATIVE | Facility: OTHER | Age: 46
End: 2021-07-21

## 2021-07-23 ENCOUNTER — OFFICE VISIT (OUTPATIENT)
Dept: DERMATOLOGY | Facility: CLINIC | Age: 46
End: 2021-07-23
Payer: COMMERCIAL

## 2021-07-23 DIAGNOSIS — B07.8 OTHER VIRAL WARTS: Primary | ICD-10-CM

## 2021-07-23 PROCEDURE — 99499 NO LOS: ICD-10-PCS | Mod: S$GLB,,, | Performed by: DERMATOLOGY

## 2021-07-23 PROCEDURE — 99999 PR PBB SHADOW E&M-EST. PATIENT-LVL III: ICD-10-PCS | Mod: PBBFAC,,, | Performed by: DERMATOLOGY

## 2021-07-23 PROCEDURE — 99499 UNLISTED E&M SERVICE: CPT | Mod: S$GLB,,, | Performed by: DERMATOLOGY

## 2021-07-23 PROCEDURE — 17110 PR DESTRUCTION BENIGN LESIONS UP TO 14: ICD-10-PCS | Mod: S$GLB,,, | Performed by: DERMATOLOGY

## 2021-07-23 PROCEDURE — 3051F HG A1C>EQUAL 7.0%<8.0%: CPT | Mod: CPTII,S$GLB,, | Performed by: DERMATOLOGY

## 2021-07-23 PROCEDURE — 99999 PR PBB SHADOW E&M-EST. PATIENT-LVL III: CPT | Mod: PBBFAC,,, | Performed by: DERMATOLOGY

## 2021-07-23 PROCEDURE — 1126F PR PAIN SEVERITY QUANTIFIED, NO PAIN PRESENT: ICD-10-PCS | Mod: CPTII,S$GLB,, | Performed by: DERMATOLOGY

## 2021-07-23 PROCEDURE — 17110 DESTRUCTION B9 LES UP TO 14: CPT | Mod: S$GLB,,, | Performed by: DERMATOLOGY

## 2021-07-23 PROCEDURE — 3051F PR MOST RECENT HEMOGLOBIN A1C LEVEL 7.0 - < 8.0%: ICD-10-PCS | Mod: CPTII,S$GLB,, | Performed by: DERMATOLOGY

## 2021-07-23 PROCEDURE — 1126F AMNT PAIN NOTED NONE PRSNT: CPT | Mod: CPTII,S$GLB,, | Performed by: DERMATOLOGY

## 2021-08-04 ENCOUNTER — HOSPITAL ENCOUNTER (EMERGENCY)
Facility: HOSPITAL | Age: 46
Discharge: HOME | End: 2021-08-04
Attending: EMERGENCY MEDICINE
Payer: COMMERCIAL

## 2021-08-04 VITALS
HEIGHT: 69 IN | RESPIRATION RATE: 18 BRPM | DIASTOLIC BLOOD PRESSURE: 75 MMHG | TEMPERATURE: 99 F | OXYGEN SATURATION: 97 % | BODY MASS INDEX: 23.7 KG/M2 | HEART RATE: 84 BPM | WEIGHT: 160 LBS | SYSTOLIC BLOOD PRESSURE: 127 MMHG

## 2021-08-04 DIAGNOSIS — U07.1 COVID-19: Primary | ICD-10-CM

## 2021-08-04 PROCEDURE — 99284 PR EMERGENCY DEPT VISIT,LEVEL IV: ICD-10-PCS | Mod: ,,, | Performed by: PHYSICIAN ASSISTANT

## 2021-08-04 PROCEDURE — 99284 EMERGENCY DEPT VISIT MOD MDM: CPT

## 2021-08-04 PROCEDURE — 99284 EMERGENCY DEPT VISIT MOD MDM: CPT | Mod: ,,, | Performed by: PHYSICIAN ASSISTANT

## 2021-08-04 RX ORDER — BENZONATATE 100 MG/1
100 CAPSULE ORAL 3 TIMES DAILY PRN
Qty: 20 CAPSULE | Refills: 0 | Status: SHIPPED | OUTPATIENT
Start: 2021-08-04 | End: 2021-08-14

## 2021-08-04 RX ORDER — ALBUTEROL SULFATE 90 UG/1
1-2 AEROSOL, METERED RESPIRATORY (INHALATION) EVERY 6 HOURS PRN
Qty: 1 G | Refills: 0 | Status: SHIPPED | OUTPATIENT
Start: 2021-08-04 | End: 2022-02-14

## 2021-08-23 ENCOUNTER — PATIENT MESSAGE (OUTPATIENT)
Dept: INTERNAL MEDICINE | Facility: CLINIC | Age: 46
End: 2021-08-23

## 2021-08-23 DIAGNOSIS — F31.77 BIPOLAR DISORDER, IN PARTIAL REMISSION, MOST RECENT EPISODE MIXED: ICD-10-CM

## 2021-08-24 RX ORDER — LURASIDONE HYDROCHLORIDE 40 MG/1
40 TABLET, FILM COATED ORAL DAILY
Qty: 90 TABLET | Refills: 3 | Status: SHIPPED | OUTPATIENT
Start: 2021-08-24 | End: 2022-07-08 | Stop reason: DRUGHIGH

## 2021-08-26 ENCOUNTER — PATIENT OUTREACH (OUTPATIENT)
Dept: ADMINISTRATIVE | Facility: OTHER | Age: 46
End: 2021-08-26

## 2021-10-20 ENCOUNTER — CLINICAL SUPPORT (OUTPATIENT)
Dept: SMOKING CESSATION | Facility: CLINIC | Age: 46
End: 2021-10-20
Payer: COMMERCIAL

## 2021-10-20 ENCOUNTER — PATIENT MESSAGE (OUTPATIENT)
Dept: INTERNAL MEDICINE | Facility: CLINIC | Age: 46
End: 2021-10-20
Payer: COMMERCIAL

## 2021-10-20 DIAGNOSIS — F17.200 NICOTINE DEPENDENCE: Primary | ICD-10-CM

## 2021-10-20 PROCEDURE — 99407 PR TOBACCO USE CESSATION INTENSIVE >10 MINUTES: ICD-10-PCS | Mod: S$GLB,,,

## 2021-10-20 PROCEDURE — 99407 BEHAV CHNG SMOKING > 10 MIN: CPT | Mod: S$GLB,,,

## 2021-11-04 ENCOUNTER — CLINICAL SUPPORT (OUTPATIENT)
Dept: SMOKING CESSATION | Facility: CLINIC | Age: 46
End: 2021-11-04
Payer: COMMERCIAL

## 2021-11-04 DIAGNOSIS — F17.200 NICOTINE DEPENDENCE: Primary | ICD-10-CM

## 2021-11-04 PROCEDURE — 99999 PR PBB SHADOW E&M-EST. PATIENT-LVL I: CPT | Mod: PBBFAC,,,

## 2021-11-04 PROCEDURE — 99999 PR PBB SHADOW E&M-EST. PATIENT-LVL I: ICD-10-PCS | Mod: PBBFAC,,,

## 2021-11-04 PROCEDURE — 99404 PREV MED CNSL INDIV APPRX 60: CPT | Mod: S$GLB,,,

## 2021-11-04 PROCEDURE — 99404 PR PREVENT COUNSEL,INDIV,60 MIN: ICD-10-PCS | Mod: S$GLB,,,

## 2021-11-04 RX ORDER — MICONAZOLE NITRATE 2 %
2 CREAM (GRAM) TOPICAL
Qty: 200 EACH | Refills: 0 | Status: SHIPPED | OUTPATIENT
Start: 2021-11-04

## 2021-11-04 RX ORDER — IBUPROFEN 200 MG
1 TABLET ORAL DAILY
Qty: 14 PATCH | Refills: 0 | Status: SHIPPED | OUTPATIENT
Start: 2021-11-04 | End: 2021-11-18 | Stop reason: SDUPTHER

## 2021-11-18 ENCOUNTER — CLINICAL SUPPORT (OUTPATIENT)
Dept: SMOKING CESSATION | Facility: CLINIC | Age: 46
End: 2021-11-18
Payer: COMMERCIAL

## 2021-11-18 DIAGNOSIS — F17.200 NICOTINE DEPENDENCE: Primary | ICD-10-CM

## 2021-11-18 PROCEDURE — 99404 PREV MED CNSL INDIV APPRX 60: CPT | Mod: S$GLB,,,

## 2021-11-18 PROCEDURE — 99404 PR PREVENT COUNSEL,INDIV,60 MIN: ICD-10-PCS | Mod: S$GLB,,,

## 2021-11-18 RX ORDER — IBUPROFEN 200 MG
TABLET ORAL
Qty: 28 PATCH | Refills: 0 | Status: SHIPPED | OUTPATIENT
Start: 2021-11-18 | End: 2021-11-30 | Stop reason: SDUPTHER

## 2021-11-18 RX ORDER — IBUPROFEN 200 MG
1 TABLET ORAL DAILY
Qty: 28 PATCH | Refills: 0 | Status: SHIPPED | OUTPATIENT
Start: 2021-11-18 | End: 2021-11-18

## 2021-11-18 RX ORDER — VARENICLINE TARTRATE 0.5 (11)-1
KIT ORAL
COMMUNITY
End: 2021-12-16 | Stop reason: DRUGHIGH

## 2021-11-30 ENCOUNTER — CLINICAL SUPPORT (OUTPATIENT)
Dept: SMOKING CESSATION | Facility: CLINIC | Age: 46
End: 2021-11-30
Payer: COMMERCIAL

## 2021-11-30 DIAGNOSIS — F17.200 NICOTINE DEPENDENCE: Primary | ICD-10-CM

## 2021-11-30 PROCEDURE — 99403 PREV MED CNSL INDIV APPRX 45: CPT | Mod: S$GLB,,,

## 2021-11-30 PROCEDURE — 99999 PR PBB SHADOW E&M-EST. PATIENT-LVL I: ICD-10-PCS | Mod: PBBFAC,,,

## 2021-11-30 PROCEDURE — 99403 PR PREVENT COUNSEL,INDIV,45 MIN: ICD-10-PCS | Mod: S$GLB,,,

## 2021-11-30 PROCEDURE — 99999 PR PBB SHADOW E&M-EST. PATIENT-LVL I: CPT | Mod: PBBFAC,,,

## 2021-11-30 RX ORDER — IBUPROFEN 200 MG
TABLET ORAL
Qty: 28 PATCH | Refills: 0 | Status: SHIPPED | OUTPATIENT
Start: 2021-11-30 | End: 2021-12-16 | Stop reason: SDUPTHER

## 2021-12-16 ENCOUNTER — CLINICAL SUPPORT (OUTPATIENT)
Dept: SMOKING CESSATION | Facility: CLINIC | Age: 46
End: 2021-12-16
Payer: COMMERCIAL

## 2021-12-16 DIAGNOSIS — F17.200 NICOTINE DEPENDENCE: Primary | ICD-10-CM

## 2021-12-16 PROCEDURE — 99999 PR PBB SHADOW E&M-EST. PATIENT-LVL I: CPT | Mod: PBBFAC,,,

## 2021-12-16 PROCEDURE — 99404 PREV MED CNSL INDIV APPRX 60: CPT | Mod: S$GLB,,,

## 2021-12-16 PROCEDURE — 99404 PR PREVENT COUNSEL,INDIV,60 MIN: ICD-10-PCS | Mod: S$GLB,,,

## 2021-12-16 PROCEDURE — 99999 PR PBB SHADOW E&M-EST. PATIENT-LVL I: ICD-10-PCS | Mod: PBBFAC,,,

## 2021-12-16 RX ORDER — VARENICLINE TARTRATE 1 MG/1
1 TABLET, FILM COATED ORAL 2 TIMES DAILY
Qty: 56 TABLET | Refills: 0 | Status: SHIPPED | OUTPATIENT
Start: 2021-12-16 | End: 2021-12-16

## 2021-12-16 RX ORDER — VARENICLINE TARTRATE 1 MG/1
1 TABLET, FILM COATED ORAL 2 TIMES DAILY
Qty: 56 TABLET | Refills: 0 | Status: SHIPPED | OUTPATIENT
Start: 2021-12-16 | End: 2022-07-08

## 2021-12-16 RX ORDER — IBUPROFEN 200 MG
TABLET ORAL
Qty: 28 PATCH | Refills: 0 | Status: SHIPPED | OUTPATIENT
Start: 2021-12-16

## 2021-12-22 DIAGNOSIS — E11.9 TYPE 2 DIABETES MELLITUS WITHOUT COMPLICATION: ICD-10-CM

## 2022-01-06 ENCOUNTER — TELEPHONE (OUTPATIENT)
Dept: SMOKING CESSATION | Facility: CLINIC | Age: 47
End: 2022-01-06
Payer: COMMERCIAL

## 2022-01-06 NOTE — TELEPHONE ENCOUNTER
Called patient in regards to canceled appointment today at 10 am. Left message and contact information for patient to call back to reschedule.  Sarah Oviedo, CTTS  [phone]

## 2022-01-10 ENCOUNTER — TELEPHONE (OUTPATIENT)
Dept: SMOKING CESSATION | Facility: CLINIC | Age: 47
End: 2022-01-10
Payer: COMMERCIAL

## 2022-01-10 NOTE — TELEPHONE ENCOUNTER
Called patient in regard to smoking cessation progress update. Phone call went to voicemail. Left message and contact information for patient to call back.  Sarah Oviedo, CTTS  [phone]

## 2022-01-18 ENCOUNTER — PATIENT MESSAGE (OUTPATIENT)
Dept: ADMINISTRATIVE | Facility: HOSPITAL | Age: 47
End: 2022-01-18
Payer: COMMERCIAL

## 2022-01-24 ENCOUNTER — TELEPHONE (OUTPATIENT)
Dept: SMOKING CESSATION | Facility: CLINIC | Age: 47
End: 2022-01-24
Payer: COMMERCIAL

## 2022-01-24 NOTE — TELEPHONE ENCOUNTER
Called patient in regards to smoking cessation progress update after no show clinic appointment on 1/6/22. Left contact information for patient to call back.  Sarah Oviedo, CTTS  [phone]

## 2022-01-25 ENCOUNTER — TELEPHONE (OUTPATIENT)
Dept: SMOKING CESSATION | Facility: CLINIC | Age: 47
End: 2022-01-25
Payer: COMMERCIAL

## 2022-01-26 NOTE — TELEPHONE ENCOUNTER
Smoking Cessation Clinic- called patient for smoking cessation progess. Left message to call back .  Sarah Oviedo, [facility]  [phone] or [phone].

## 2022-02-03 ENCOUNTER — PATIENT MESSAGE (OUTPATIENT)
Dept: INTERNAL MEDICINE | Facility: CLINIC | Age: 47
End: 2022-02-03
Payer: COMMERCIAL

## 2022-02-14 ENCOUNTER — OFFICE VISIT (OUTPATIENT)
Dept: INTERNAL MEDICINE | Facility: CLINIC | Age: 47
End: 2022-02-14
Payer: COMMERCIAL

## 2022-02-14 VITALS
HEIGHT: 69 IN | HEART RATE: 68 BPM | OXYGEN SATURATION: 99 % | BODY MASS INDEX: 24.36 KG/M2 | SYSTOLIC BLOOD PRESSURE: 110 MMHG | DIASTOLIC BLOOD PRESSURE: 70 MMHG | WEIGHT: 164.44 LBS

## 2022-02-14 DIAGNOSIS — F31.81 BIPOLAR II DISORDER: ICD-10-CM

## 2022-02-14 DIAGNOSIS — Z12.11 ENCOUNTER FOR SCREENING FOR MALIGNANT NEOPLASM OF COLON: ICD-10-CM

## 2022-02-14 DIAGNOSIS — Z79.4 CONTROLLED TYPE 2 DIABETES MELLITUS WITHOUT COMPLICATION, WITH LONG-TERM CURRENT USE OF INSULIN: ICD-10-CM

## 2022-02-14 DIAGNOSIS — F17.200 SMOKER: ICD-10-CM

## 2022-02-14 DIAGNOSIS — M25.512 ACUTE PAIN OF LEFT SHOULDER: Primary | ICD-10-CM

## 2022-02-14 DIAGNOSIS — E11.9 CONTROLLED TYPE 2 DIABETES MELLITUS WITHOUT COMPLICATION, WITH LONG-TERM CURRENT USE OF INSULIN: ICD-10-CM

## 2022-02-14 LAB
ALBUMIN/CREAT UR: NORMAL UG/MG (ref 0–30)
CREAT UR-MCNC: 71 MG/DL (ref 23–375)
MICROALBUMIN UR DL<=1MG/L-MCNC: <5 UG/ML

## 2022-02-14 PROCEDURE — 3008F PR BODY MASS INDEX (BMI) DOCUMENTED: ICD-10-PCS | Mod: CPTII,S$GLB,, | Performed by: INTERNAL MEDICINE

## 2022-02-14 PROCEDURE — 1159F MED LIST DOCD IN RCRD: CPT | Mod: CPTII,S$GLB,, | Performed by: INTERNAL MEDICINE

## 2022-02-14 PROCEDURE — 82043 UR ALBUMIN QUANTITATIVE: CPT | Performed by: INTERNAL MEDICINE

## 2022-02-14 PROCEDURE — 3051F PR MOST RECENT HEMOGLOBIN A1C LEVEL 7.0 - < 8.0%: ICD-10-PCS | Mod: CPTII,S$GLB,, | Performed by: INTERNAL MEDICINE

## 2022-02-14 PROCEDURE — 3074F SYST BP LT 130 MM HG: CPT | Mod: CPTII,S$GLB,, | Performed by: INTERNAL MEDICINE

## 2022-02-14 PROCEDURE — 3078F DIAST BP <80 MM HG: CPT | Mod: CPTII,S$GLB,, | Performed by: INTERNAL MEDICINE

## 2022-02-14 PROCEDURE — 1160F PR REVIEW ALL MEDS BY PRESCRIBER/CLIN PHARMACIST DOCUMENTED: ICD-10-PCS | Mod: CPTII,S$GLB,, | Performed by: INTERNAL MEDICINE

## 2022-02-14 PROCEDURE — 1160F RVW MEDS BY RX/DR IN RCRD: CPT | Mod: CPTII,S$GLB,, | Performed by: INTERNAL MEDICINE

## 2022-02-14 PROCEDURE — 3051F HG A1C>EQUAL 7.0%<8.0%: CPT | Mod: CPTII,S$GLB,, | Performed by: INTERNAL MEDICINE

## 2022-02-14 PROCEDURE — 3074F PR MOST RECENT SYSTOLIC BLOOD PRESSURE < 130 MM HG: ICD-10-PCS | Mod: CPTII,S$GLB,, | Performed by: INTERNAL MEDICINE

## 2022-02-14 PROCEDURE — 1159F PR MEDICATION LIST DOCUMENTED IN MEDICAL RECORD: ICD-10-PCS | Mod: CPTII,S$GLB,, | Performed by: INTERNAL MEDICINE

## 2022-02-14 PROCEDURE — 99214 PR OFFICE/OUTPT VISIT, EST, LEVL IV, 30-39 MIN: ICD-10-PCS | Mod: S$GLB,,, | Performed by: INTERNAL MEDICINE

## 2022-02-14 PROCEDURE — 99999 PR PBB SHADOW E&M-EST. PATIENT-LVL IV: ICD-10-PCS | Mod: PBBFAC,,, | Performed by: INTERNAL MEDICINE

## 2022-02-14 PROCEDURE — 99214 OFFICE O/P EST MOD 30 MIN: CPT | Mod: S$GLB,,, | Performed by: INTERNAL MEDICINE

## 2022-02-14 PROCEDURE — 3008F BODY MASS INDEX DOCD: CPT | Mod: CPTII,S$GLB,, | Performed by: INTERNAL MEDICINE

## 2022-02-14 PROCEDURE — 99999 PR PBB SHADOW E&M-EST. PATIENT-LVL IV: CPT | Mod: PBBFAC,,, | Performed by: INTERNAL MEDICINE

## 2022-02-14 PROCEDURE — 3078F PR MOST RECENT DIASTOLIC BLOOD PRESSURE < 80 MM HG: ICD-10-PCS | Mod: CPTII,S$GLB,, | Performed by: INTERNAL MEDICINE

## 2022-02-14 PROCEDURE — 82570 ASSAY OF URINE CREATININE: CPT | Performed by: INTERNAL MEDICINE

## 2022-02-14 NOTE — PROGRESS NOTES
Subjective:       Patient ID: Pierre Edmonds Jr. is a 46 y.o. male.    Chief Complaint:   Shoulder Pain (Left shoulder)    HPI - Left shoulder pain for a month, with decreased ROM and tightness.  No injury or trauma noted.  He is due for a lot of diabetes health maintenance and a colonoscopy.  He's still smoking; had cut down to 9 cigs/day from 2.5 ppd.  However, he's back up to a ppd.  He is happy with his meds for bipolar; mood is very stable now.      PMH:  DM2, usually under good control  Bipolar Disorder  History of multiple HNPs with pain  Cigarette smoker  IBS  GERD     Meds:  Reviewed and reconciled in EPIC with patient during visit today.     Review of Systems   Constitutional: Negative for fever.   HENT: Negative for congestion.    Respiratory: Negative for shortness of breath.    Cardiovascular: Negative for chest pain.   Gastrointestinal: Negative for abdominal pain.   Genitourinary: Negative for difficulty urinating.   Musculoskeletal: Positive for arthralgias.   Skin: Negative for rash.   Neurological: Negative for headaches.   Psychiatric/Behavioral: Negative for sleep disturbance.       Objective:      Physical Exam  Vitals reviewed.   Constitutional:       General: He is not in acute distress.     Appearance: Normal appearance. He is well-developed, normal weight and well-nourished. He is not diaphoretic.   HENT:      Head: Normocephalic and atraumatic.   Cardiovascular:      Rate and Rhythm: Normal rate and regular rhythm.      Pulses:           Dorsalis pedis pulses are 2+ on the right side and 2+ on the left side.      Heart sounds: Normal heart sounds. No murmur heard.  No friction rub. No gallop.    Pulmonary:      Effort: No respiratory distress.      Breath sounds: No wheezing or rales.   Chest:      Chest wall: No tenderness.   Musculoskeletal:      Comments: Bilateral shoulders with FROM except for FF on left.  No crepitus noted.  Rotator cuff intact bilaterally   Feet:      Right foot:       Protective Sensation: 4 sites tested. 4 sites sensed.      Skin integrity: No ulcer, blister or skin breakdown.      Left foot:      Protective Sensation: 4 sites tested. 4 sites sensed.      Skin integrity: No ulcer, blister or skin breakdown.   Skin:     General: Skin is warm.      Findings: No erythema.   Neurological:      Mental Status: He is alert and oriented to person, place, and time.   Psychiatric:         Mood and Affect: Mood and affect normal.         Thought Content: Thought content normal.         Assessment:       1. Acute pain of left shoulder    2. Controlled type 2 diabetes mellitus without complication, with long-term current use of insulin    3. Bipolar II disorder    4. Smoker    5. Encounter for screening for malignant neoplasm of colon        Plan:       Pierre was seen today for shoulder pain.    Diagnoses and all orders for this visit:    Acute pain of left shoulder - will see if PT can help.  Can use tylenol prn  -     Ambulatory referral/consult to Physical/Occupational Therapy; Future    Controlled type 2 diabetes mellitus without complication, with long-term current use of insulin - due for labs  -     Microalbumin/Creatinine Ratio, Urine  -     Hemoglobin A1C; Future  -     Comprehensive Metabolic Panel; Future    Bipolar II disorder - stable on treatment    Smoker - encouraged another attempt at quitting.    Encounter for screening for malignant neoplasm of colon  -     Case Request Endoscopy: COLONOSCOPY    rtc 6 months    G Jayy Sanchez MD MPH  Staff Internist

## 2022-02-16 ENCOUNTER — CLINICAL SUPPORT (OUTPATIENT)
Dept: REHABILITATION | Facility: HOSPITAL | Age: 47
End: 2022-02-16
Payer: COMMERCIAL

## 2022-02-16 DIAGNOSIS — M25.611 DECREASED RIGHT SHOULDER RANGE OF MOTION: ICD-10-CM

## 2022-02-16 DIAGNOSIS — M25.512 ACUTE PAIN OF LEFT SHOULDER: ICD-10-CM

## 2022-02-16 DIAGNOSIS — R29.898 DECREASED STRENGTH OF UPPER EXTREMITY: ICD-10-CM

## 2022-02-16 PROCEDURE — 97140 MANUAL THERAPY 1/> REGIONS: CPT

## 2022-02-16 PROCEDURE — 97162 PT EVAL MOD COMPLEX 30 MIN: CPT

## 2022-02-16 PROCEDURE — 97110 THERAPEUTIC EXERCISES: CPT

## 2022-02-18 ENCOUNTER — CLINICAL SUPPORT (OUTPATIENT)
Dept: REHABILITATION | Facility: HOSPITAL | Age: 47
End: 2022-02-18
Payer: COMMERCIAL

## 2022-02-18 DIAGNOSIS — R29.898 DECREASED STRENGTH OF UPPER EXTREMITY: ICD-10-CM

## 2022-02-18 DIAGNOSIS — M25.611 DECREASED RIGHT SHOULDER RANGE OF MOTION: ICD-10-CM

## 2022-02-18 PROCEDURE — 97110 THERAPEUTIC EXERCISES: CPT

## 2022-02-18 PROCEDURE — 97112 NEUROMUSCULAR REEDUCATION: CPT

## 2022-02-18 PROCEDURE — 97140 MANUAL THERAPY 1/> REGIONS: CPT

## 2022-02-18 NOTE — PROGRESS NOTES
"[facility]   Physical Therapy Treatment Note     Name: Pierre Edmonds Jr.  Clinic Number: [id number]    Therapy Diagnosis:   Encounter Diagnoses   Name Primary?    Decreased right shoulder range of motion     Decreased strength of upper extremity      Physician: Jose Sanchez II, MD    Visit Date: 2/18/2022  Physician Orders: PT Eval and Treat   Medical Diagnosis from Referral: M25.512 (ICD-10-CM) - Acute pain of left shoulder  Evaluation Date: 2/16/2022  Authorization Period Expiration: 2/14/2023  Plan of Care Expiration: 03/30/2022  Visit # / Visits authorized: 1/ 10     Time In: 1000 am  Time Out: 1100 pm  Total Appointment Time (timed & untimed codes): 60 minutes (3 NMR, 1 TE, 1 MT)     Precautions: Standard, bipolar disorder, Type II Diabetes (currently uncontrolled), current smoker  SUBJECTIVE     Pt reports: he felt much "looser" after initial eval and has been working on his posture a lot and now only has pain reaching overhead and not while driving. Does note increased "tightness" in B upper traps   He was compliant with home exercise program.  Response to previous treatment: decreased overall pain  Functional change: no pain with driving    Pain: 1/10  Location: right shoulder      OBJECTIVE     Objective Measures updated at progress report unless specified.   Upper Limb Neurotension Tests:  (+) Median n. - anterior interosseous n. (C5-7)   (+) Median n. - musculocutaneous & axillary n. (C5-7)   (+) Ulnar n. (C8-T1)  (-) Radial n. (C5-T1)    Treatment       Pierre received the treatments listed below:      THERAPEUTIC EXERCISES to develop strength, endurance, ROM, flexibility, posture and core stabilization for 10 minutes including:  - Open books; R SL; 15x  - Box breathing supine on 1/2 foam roll; 5 min     MANUAL THERAPY TECHNIQUES including Joint mobilizations and Soft tissue Mobilization were applied to L shoulder and c/s for 10 minutes.  - glenohumeral mobs Gr 3-4 " "(posterior and inferior)  - Mob w/ movement; scaption w/ ER post/inf glide  - prone t/s mobs T7-10    NEUROMUSCULAR RE-EDUCATION ACTIVITIES to improve Balance, Coordination, Kinesthetic, Sense, Proprioception and Posture for 40 minutes.  The following were included:    - seated chin tucks w/ scap retraction; 20x w/ 10" hold  - standing median nerve slides; 15x  - standing ulnar nerve slides; 15x    - Scapular isometrics in SL; 10 x 10" each direction  - Wall posture; 3 x 1 min  - glenohumeral isometrics at wall; IR/ER; 5 x 10" each    Next Session:   - Low rows w/ RTB; 3 x 15 w/ 5" hold  - shoulder ext, palms forward; YTB; 3 x 15 w/ 5" hold      Patient Education and Home Exercises     Home Exercises Provided and Patient Education Provided     Education provided:   - appropriate form, sets, and reps for nerve slides to decrease adverse neural tension    Written Home Exercises Provided: Patient instructed to cont prior HEP. Exercises were reviewed and Pierre was able to demonstrate them prior to the end of the session.  Pierre demonstrated good  understanding of the education provided. See EMR under Patient Instructions for exercises provided during therapy sessions. Updated HEP: 2/18/2022    ASSESSMENT   Pierre presents with continued complaints of L shoulder pain, today added complaint of upper trap tightness which was improved with cervical retractions. He demonstrated positive neural tension testing and had reproduction of concordant shoulder pain with this. He was given nerve slides as part of updated HEP to address this. He will benefit continued postural education, diaphragmatic breathing, and further testing to rule in/out shoulder pathology.     Pierre Is progressing well towards his goals.   Pt prognosis is Good.     Pt will continue to benefit from skilled outpatient physical therapy to address the deficits listed in the problem list box on initial evaluation, provide pt/family education and to " maximize pt's level of independence in the home and community environment.     Pt's spiritual, cultural and educational needs considered and pt agreeable to plan of care and goals.     Anticipated Barriers for therapy: bipolar disorder, Type II Diabetes (currently uncontrolled), current smoker    Goals:  Short Term Goals: 3 weeks  - The patient will be independent with initial home exercise program. (Progressing, not met)  - Report decreased shoulder pain  <   / =  2 /10  to increase tolerance for adls, work  (Progressing, not met)  - Increase cervical ROM by 5-10 degrees in order to perform ADLs with decreased difficulty.  (Progressing, not met)  - Increase strength in B scapular stabilizers by 1/3 MMT grade to increase tolerance for ADL and work activities.  (Progressing, not met)     Long Term Goals: 6 weeks  - Report decreased neck/shoulder pain  <   / =  2  /10  during aggravating activities to increase tolerance for adls, work.  (Progressing, not met)  - Increased strength in B scapular stabilizers to >/= 4/5 MMT grade to increase tolerance for ADL and work activities.  (Progressing, not met)  - Pt will report at thoraciclevel (27% impaired) on FOTO neck score for neck pain disability to demonstrate decrease in disability and improvement in neck pain.  (Progressing, not met)  - The patient will be independent with home exercise program and symptom management.  (Progressing, not met)      PLAN     Continue to progress postural re-education. Progress towards eventual functional movements and lifting form at work.   Check Bakody's sign for increased symptoms. Decreased quantity of manual interventions.  Finish clearing the shoulder.    Nikki Quezada, PT, DPT

## 2022-02-18 NOTE — PLAN OF CARE
[facility]  Physical Therapy Initial Evaluation    Name: Pierre Edmonds Jr.  Clinic Number: [id number]    Therapy Diagnosis:   Encounter Diagnoses   Name Primary?    Acute pain of left shoulder     Decreased right shoulder range of motion     Decreased strength of upper extremity      Physician: Jose Sanchez II, MD    Physician Orders: PT Eval and Treat   Medical Diagnosis from Referral: M25.512 (ICD-10-CM) - Acute pain of left shoulder  Evaluation Date: 2/16/2022  Authorization Period Expiration: 2/14/2023  Plan of Care Expiration: 03/30/2022  Visit # / Visits authorized: 1/ 1    Time In: 1100 am  Time Out: 1200 pm  Total Appointment Time (timed & untimed codes): 60 minutes (1 MCE, 1 MT, 1 TE)    Precautions: Standard, bipolar disorder, Type II Diabetes (currently uncontrolled), current smoker    Subjective   Date of onset: about 1 month ago  History of current condition - Pierre reports: he woke up with pain in his L shoulder and collar bone about a month ago. He is unable to reach across body, behind him, or overhead. He reports the pain is inconsistent day to to day and describes it as very sore muscle feeling at superior aspect of shoulder and clavicle. He has increased pain with driving (hand at 12:00 position), as well as sudden movements, dressing and bathing. Initially the pain was constant for 2-3 weeks but has started to be more intermittent. Today he has minimal pain. He denies progressive changes in range of motion as well as numbness, tingling, burning, or other parsthesias. 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 His pain is worse AM vs PM.     He reports a history of LBP 2-3 years ago for which he saw a chiropractor for about a year. He reports that the chiropractor was very helpful for his low back pain but at one point attempted to adjust his cervical spine and he had immediate and  severe neck pain that radiated into B shoulders. [facility] went to [facility] where he was given CSI for both neck and low back pain which he hasn't suffered from since.     Medical History:   Past Medical History:   Diagnosis Date    Atypical nevus of back excised 8/2019    severely atypical     Diabetes mellitus type II     Fever blister     Herniated lumbar intervertebral disc 2011       Surgical History:   Pierre Edmonds Jr.  has a past surgical history that includes Appendectomy.    Medications:   Pierre has a current medication list which includes the following prescription(s): bd stephan 2nd gen pen needle, blood sugar diagnostic, blood-glucose meter, levemir flextouch u-100 insuln, lancets, lurasidone, nicotine, nicotine (polacrilex), sildenafil, varenicline, [DISCONTINUED] bupropion, and [DISCONTINUED] zolpidem.    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  Prior Level of Function: went to gym 5 days/wk and performed generalized weight training  Current Level of Function: walks for exercises, but can't perform shoulder exericses    Pain:  Current 1/10, worst 4/10, best 1/10   Location: left shoulder    Description: Aching and sore  Aggravating Factors: Sitting and Morning  Easing Factors: nothing    Pts goals: to have less pain and make sure it's not damaged    Objective     Posture: B rounded shoulders, moderate FHP     CERVICAL AROM Pain/Dysfunction with Movement   Flexion 48/45 Pressure on flexion, and sharp pain on return to upright   Extension 40/90 Hinge point at mid cervical   Right side bending 30/40 Sharp pain on lower R    Left side bending 33/40    Right rotation 60/90    Left rotation 60/90          Cervical Special  Tests:  Vertebral Artery Test -   Alar Ligament (Lateral Flexion) -   Transverse Ligament  -   Compression -   Distraction -   Spurlings Max Compression -   DNF Endurance Test NT         UE Myotomes Right  Left  Pain/Dysfunction with Movement   C5 Deltoid 5/5 4+/5    C5 Infraspinatus 5/5 4/5    C6 Subscapularis 5/5 4/5     C6 ECRL 5/5 4+/5     C6 Biceps 5/5 4+/5     C7 Triceps 5/5 4/5     C7 FCU 5/5 5/5     C8 EPL 5/5 5/5    C8 OPL 5/5 5/5    T1 3rd-4th Interossei 5/5 5/5         Reflexes:   - Biceps: 2+ on R, 3+ on L   - Triceps: 2+ on R; 3+ on L   - ECR: 2+ on R; 3+ on L    - Hoffamn's: B (-)     Passive Range of Motion:   Shoulder Left Right   Flexion 168 180   Abduction 90* 178   ER at 0 45 75   ER at 90 90 96   IR at 90 75 74      Active Range of Motion:   Shoulder Left Right   Flexion 135 180   Abduction 111 178   ER Functional C7 T2   IR Functional  L3 L1     Upper Extremity Strength   (L) UE (R) UE   Shoulder flexion: 4/5 5/5   Shoulder Abduction: 4/5 5/5   Shoulder ER 4-/5 4+/5   Shoulder IR 4-/5 4+/5   Lower Trap 4/5 4+/5   Middle Trap 4+/5 4+/5   Serratus anterior 3+/5 4/5   Rhomboids 3+/5 4+/5        Special Tests:  AC Joint Left Right   AC Joint Compression Test - -   Empty Can Test - -   Drop Arm test - -   Subscaputlaris Lift Off + -   Clunk test - -   O'aidan's test - -   Hawkin's Kenndy + -   Neer's Test - -   Speed's test - -   Anterior Apprehension test - -   Posterior Apprehension test - -   Sulcus Sign - -          Scapular Control/Dyskinesis:    Normal / Subtle / Obvious  Comments    Left  Obvious scapula abducted and upwardly rotated 1.5 handwidth from spine    Right  Obvious Scapular abducted and upwardly rotated 1 hand width from spine     Joint Mobility:   Cervical: C0-C2 on R hypomobile, C5-7 hypomobile on L  Thoracic: hypomobile throughout  Glenohumeral: Decreased posterior and inferior glide on L     Upper Limb Neurotension Tests:  (NT) Median n. - anterior interosseous n. (C5-7)   (NT)  "Median n. - musculocutaneous & axillary n. (C5-7)   (NT) Ulnar n. (C8-T1)  (NT) Radial n. (C5-T1)    Palpation: TTP at suboccipitals, CTJ, and L supraspinatus muscle belly.    Sensation: WNL    Flexibility: decreased pec minor length, L worse than R       Limitation/Restriction for FOTO Shoulder Survey    Therapist reviewed FOTO scores for Pierre Edmonds Jr. on 2/16/2022.   FOTO documents entered into [facility] - see Media section.    Limitation Score: 41%  Predicted Score: 27%         TREATMENT   Treatment Time In: 1142 am  Treatment Time Out: 1200 pm  Total Treatment time (time-based codes) separate from Evaluation: 18 minutes    Pierre received therapeutic exercises to develop strength, endurance, ROM, flexibility and posture for 10 minutes including:  - seated chin tucks; 20x w/ 5" hold  - seated scap retractions; 20x w/ 5" hold    Pierre received the following manual therapy techniques: Joint mobilizations, Myofacial release and Soft tissue Mobilization were applied to the: t/s spine for 8 minutes, including:  Mid-lower t/s HVLAT; prone  CTJ mob Gr 3-4    Home Exercises and Patient Education Provided    Education provided:   - Home Exercise Program    Written Home Exercises Provided: yes.  Exercises were reviewed and Pierre was able to demonstrate them prior to the end of the session.  Pierre demonstrated good  understanding of the education provided.     See EMR under Patient Instructions for exercises provided 2/16/2022.    Assessment   Pierre is a 46 y.o. male referred to outpatient Physical Therapy with a medical diagnosis of acute pain of left shoulder. Pt presents with limited and/or painful L shoulder and cervical ROM, L UE weakness, tenderness/tightness of suboccipitals and B pec minor, and functional limitations of decreased ability to reach overhead and perform ADL. His signs and symptoms are consistent with neck pain with radiculopathy, differential diagnosis of concomitant RTC pathology or non- " musculoskeletal serious pathology related to co-morbidities and lifestyle.  Pierre would benefit from skilled OP PT to address the above concerns and improve his functional independence.    Pt prognosis is Good.   Pt will benefit from skilled outpatient Physical Therapy to address the deficits stated above and in the chart below, provide pt/family education, and to maximize pt's level of independence.     Plan of care discussed with patient: Yes  Pt's spiritual, cultural and educational needs considered and patient is agreeable to the plan of care and goals as stated below:     Anticipated Barriers for therapy: bipolar disorder, Type II Diabetes (currently uncontrolled), current smoker    Medical Necessity is demonstrated by the following  History  Co-morbidities and personal factors that may impact the plan of care Co-morbidities:   depression, diabetes and bipolar disorder, and current smoker    Personal Factors:   Lifestyle (smoking)     high   Examination  Body Structures and Functions, activity limitations and participation restrictions that may impact the plan of care Body Regions:   head  neck  back  lower extremities  trunk    Body Systems:    gross symmetry  ROM  strength  gross coordinated movement  transfers  transitions  motor control  motor learning    Participation Restrictions:   Decreased ability to dress, bathe, reach overhead, and perform work duties    Activity limitations:   Learning and applying knowledge  no deficits    General Tasks and Commands  no deficits    Communication  no deficits    Mobility  lifting and carrying objects    Self care  washing oneself (bathing, drying, washing hands)  dressing    Domestic Life  doing house work (cleaning house, washing dishes, laundry)    Interactions/Relationships  no deficits    Life Areas  employment    Community and Social Life  no deficits         high   Clinical Presentation evolving clinical presentation with changing clinical characteristics  moderate   Decision Making/ Complexity Score: moderate     Goals:  Short Term Goals: 3 weeks  - The patient will be independent with initial home exercise program.  - Report decreased shoulder pain  <   / =  2 /10  to increase tolerance for adls, work  - Increase cervical ROM by 5-10 degrees in order to perform ADLs with decreased difficulty.  - Increase strength in B scapular stabilizers by 1/3 MMT grade to increase tolerance for ADL and work activities.     Long Term Goals: 6 weeks  - Report decreased neck/shoulder pain  <   / =  2  /10  during aggravating activities to increase tolerance for adls, work.  - Increased strength in B scapular stabilizers to >/= 4/5 MMT grade to increase tolerance for ADL and work activities.  - Pt will report at thoraciclevel (27% impaired) on FOTO neck score for neck pain disability to demonstrate decrease in disability and improvement in neck pain.  - The patient will be independent with home exercise program and symptom management.      Plan   Plan of care Certification: 2/16/2022 to 03/30/2022.    Outpatient Physical Therapy 2 times weekly for 6 weeks to include the following interventions: Manual Therapy, Moist Heat/ Ice, Neuromuscular Re-ed, Patient Education, Therapeutic Activities and Therapeutic Exercise.     Nikki Quezada PT, DPT

## 2022-02-22 ENCOUNTER — CLINICAL SUPPORT (OUTPATIENT)
Dept: REHABILITATION | Facility: HOSPITAL | Age: 47
End: 2022-02-22
Payer: COMMERCIAL

## 2022-02-22 DIAGNOSIS — R29.898 DECREASED STRENGTH OF UPPER EXTREMITY: ICD-10-CM

## 2022-02-22 DIAGNOSIS — M25.611 DECREASED RIGHT SHOULDER RANGE OF MOTION: Primary | ICD-10-CM

## 2022-02-22 PROCEDURE — 97110 THERAPEUTIC EXERCISES: CPT

## 2022-02-22 PROCEDURE — 97140 MANUAL THERAPY 1/> REGIONS: CPT

## 2022-02-22 PROCEDURE — 97112 NEUROMUSCULAR REEDUCATION: CPT

## 2022-02-22 NOTE — PROGRESS NOTES
"[facility]   Physical Therapy Treatment Note     Name: Pierre Edmonds Jr.  Clinic Number: [id number]    Therapy Diagnosis:   Encounter Diagnoses   Name Primary?    Decreased right shoulder range of motion Yes    Decreased strength of upper extremity      Physician: Jose Sanchez II, MD    Visit Date: 2/22/2022  Physician Orders: PT Eval and Treat   Medical Diagnosis from Referral: M25.512 (ICD-10-CM) - Acute pain of left shoulder  Evaluation Date: 2/16/2022  Authorization Period Expiration: 2/14/2023  Plan of Care Expiration: 03/30/2022  Visit # / Visits authorized: 2/ 10     Time In: 0900 am  Time Out: 0955 am  Total Appointment Time (timed & untimed codes): 60 minutes (2 NMR, 1 MT, 1 TE)     Precautions: Standard, bipolar disorder, Type II Diabetes (currently uncontrolled), current smoker  SUBJECTIVE     Pt reports: he feels very sore in his midback  He was compliant with home exercise program.  Response to previous treatment: decreased overall pain  Functional change: no pain with driving    Pain: 1/10  Location: right shoulder      OBJECTIVE     Objective Measures updated at progress report unless specified.   Upper Limb Neurotension Tests:  (+) Median n. - anterior interosseous n. (C5-7)   (+) Median n. - musculocutaneous & axillary n. (C5-7)   (+) Ulnar n. (C8-T1)  (-) Radial n. (C5-T1)    Bakody's Sign: no change    Treatment   Pierre received the treatments listed below:      THERAPEUTIC EXERCISES to develop strength, endurance, ROM, flexibility, posture and core stabilization for  20 minutes including:  - Open books; R SL; 15x- NP  - Box breathing supine on 1/2 foam roll; 6 min   - Low rows w/ GTB; 3 x 15 w/ 5" hold  - shoulder ext, palms forward; RTB; 3 x 15 w/ 5" hold    MANUAL THERAPY TECHNIQUES including Joint mobilizations and Soft tissue Mobilization were applied to L shoulder and c/s for 10 minutes.  - glenohumeral mobs Gr 3-4 (posterior and inferior)  - Mob w/ " "movement; scaption w/ ER post/inf glide  - prone t/s mobs T7-10- NP    NEUROMUSCULAR RE-EDUCATION ACTIVITIES to improve Balance, Coordination, Kinesthetic, Sense, Proprioception and Posture for 30 minutes.  The following were included:    - seated chin tucks w/ scap retraction; 20x w/ 10" hold  - standing median nerve slides; 15x    - Scapular isometrics in SL; 10 x 10" each direction  - Wall posture; 3 x 1 min  - glenohumeral isometrics at wall; IR/ER; 5 x 10" each        Patient Education and Home Exercises     Home Exercises Provided and Patient Education Provided     Education provided:   - appropriate form, sets, and reps for nerve slides to decrease adverse neural tension    Written Home Exercises Provided: Patient instructed to cont prior HEP. Exercises were reviewed and Pierre was able to demonstrate them prior to the end of the session.  Pierre demonstrated good  understanding of the education provided. See EMR under Patient Instructions for exercises provided during therapy sessions. Updated HEP: 2/18/2022    ASSESSMENT   Pierre presents with minimal to no shoulder pain, but did state that his mid and lower back was a little sore after open book exercises last session. These were deferred today as was manual interventions for thoracic mobilization. He responded well to continued education on posture, and form for nerve slides. He was greatly challenged by box breathing demonstrating minimal rib change movement and an over reliance on belly breathing and accessory respiratory muscles. Will continue to progress these as able.     Pierre Is progressing well towards his goals.   Pt prognosis is Good.     Pt will continue to benefit from skilled outpatient physical therapy to address the deficits listed in the problem list box on initial evaluation, provide pt/family education and to maximize pt's level of independence in the home and community environment.     Pt's spiritual, cultural and educational needs " considered and pt agreeable to plan of care and goals.     Anticipated Barriers for therapy: bipolar disorder, Type II Diabetes (currently uncontrolled), current smoker    Goals:  Short Term Goals: 3 weeks  - The patient will be independent with initial home exercise program. (Progressing, not met)  - Report decreased shoulder pain  <   / =  2 /10  to increase tolerance for adls, work  (Progressing, not met)  - Increase cervical ROM by 5-10 degrees in order to perform ADLs with decreased difficulty.  (Progressing, not met)  - Increase strength in B scapular stabilizers by 1/3 MMT grade to increase tolerance for ADL and work activities.  (Progressing, not met)     Long Term Goals: 6 weeks  - Report decreased neck/shoulder pain  <   / =  2  /10  during aggravating activities to increase tolerance for adls, work.  (Progressing, not met)  - Increased strength in B scapular stabilizers to >/= 4/5 MMT grade to increase tolerance for ADL and work activities.  (Progressing, not met)  - Pt will report at thoraciclevel (27% impaired) on FOTO neck score for neck pain disability to demonstrate decrease in disability and improvement in neck pain.  (Progressing, not met)  - The patient will be independent with home exercise program and symptom management.  (Progressing, not met)      PLAN     Continue to progress postural re-education. Progress towards eventual functional movements and lifting form at work.     Nikki Quezada, PT, DPT

## 2022-02-25 ENCOUNTER — CLINICAL SUPPORT (OUTPATIENT)
Dept: REHABILITATION | Facility: HOSPITAL | Age: 47
End: 2022-02-25
Payer: COMMERCIAL

## 2022-02-25 DIAGNOSIS — M25.611 DECREASED RIGHT SHOULDER RANGE OF MOTION: Primary | ICD-10-CM

## 2022-02-25 DIAGNOSIS — R29.898 DECREASED STRENGTH OF UPPER EXTREMITY: ICD-10-CM

## 2022-02-25 PROCEDURE — 97110 THERAPEUTIC EXERCISES: CPT

## 2022-02-25 PROCEDURE — 97112 NEUROMUSCULAR REEDUCATION: CPT

## 2022-02-25 NOTE — PROGRESS NOTES
"[facility]   Physical Therapy Treatment Note     Name: Pierre Edmonds Jr.  Clinic Number: [id number]    Therapy Diagnosis:   Encounter Diagnoses   Name Primary?    Decreased right shoulder range of motion Yes    Decreased strength of upper extremity      Physician: Jose Sanchez II, MD    Visit Date: 2/25/2022  Physician Orders: PT Eval and Treat   Medical Diagnosis from Referral: M25.512 (ICD-10-CM) - Acute pain of left shoulder  Evaluation Date: 2/16/2022  Authorization Period Expiration: 2/14/2023  Plan of Care Expiration: 03/30/2022  Visit # / Visits authorized: 3/ 10     Time In: 1100 am  Time Out: 1155 am  Total Appointment Time (timed & untimed codes): 55 minutes (2 NMR, 2 TE)     Precautions: Standard, bipolar disorder, Type II Diabetes (currently uncontrolled), current smoker  SUBJECTIVE     Pt reports: he feels good, but has been quite sore after each session of therapy  He was compliant with home exercise program.  Response to previous treatment: decreased overall pain  Functional change: no pain with driving    Pain: 1/10  Location: right shoulder      OBJECTIVE     Objective Measures updated at progress report unless specified.   Upper Limb Neurotension Tests:  (+) Median n. - anterior interosseous n. (C5-7)   (+) Median n. - musculocutaneous & axillary n. (C5-7)   (+) Ulnar n. (C8-T1)  (-) Radial n. (C5-T1)    Bakody's Sign: no change    Treatment   Pierre received the treatments listed below:      THERAPEUTIC EXERCISES to develop strength, endurance, ROM, flexibility, posture and core stabilization for  25 minutes including:  - Endurance training with reciprocal motion of all limbs on sci-fit x 8 minutes at level 4.5 at > or equal to 50 spm without rest.   - Thoracic extension over 1/2 foam; 3 min   - Box breathing supine on 1/2 foam roll; 4 min - w/ pec stretch  - Low rows w/ BTB; 3 x 15 w/ 5" hold    Not today:  - shoulder ext, palms forward; RTB; 3 x 15 w/ 5" " "hold-      MANUAL THERAPY TECHNIQUES including Joint mobilizations and Soft tissue Mobilization were applied to L shoulder and c/s for 00 minutes.  - glenohumeral mobs Gr 3-4 (posterior and inferior)  - Mob w/ movement; scaption w/ ER post/inf glide  - prone t/s mobs T7-10- NP    NEUROMUSCULAR RE-EDUCATION ACTIVITIES to improve Balance, Coordination, Kinesthetic, Sense, Proprioception and Posture for 30 minutes.  The following were included:    - seated chin tucks w/ scap retraction; 20x w/ 10" hold- manual resistance to low trap  - standing median nerve slides; 15x  - prone shoulder horiz abd w/ scap retraction; 2 x 20  - prone row; ecc; 2 x 20 w/ 5" ecc; assistance to achieve position     Not today:  - Scapular isometrics in SL; 10 x 10" each direction  - Wall posture; 3 x 1 min  - glenohumeral isometrics at wall; IR/ER; 5 x 10" each          Patient Education and Home Exercises     Home Exercises Provided and Patient Education Provided     Education provided:   - appropriate form, sets, and reps for nerve slides to decrease adverse neural tension    Written Home Exercises Provided: Patient instructed to cont prior HEP. Exercises were reviewed and Pierre was able to demonstrate them prior to the end of the session.  Pierre demonstrated good  understanding of the education provided. See EMR under Patient Instructions for exercises provided during therapy sessions. Updated HEP: 2/18/2022    ASSESSMENT   Pierre presents with minimal to no shoulder pain. He reported momentary shooting pain during standing rows, but this was resolved with appropriate scapular positioning and maintenance of that scapular position during eccentric phase of exercise. He was able to achieve low and middle trap activation in sitting with manual resistance but was unable to independently achieve activation of these in prone, instead recruiting UT. He also has increased neural symptoms with shoulder elevation and none even with full ULTT " of median nerve when appropriate scapular position is achieved. Will focus on thoracic mobility, and improving posture/parascapular control in subsequent visits.    Pierre Is progressing well towards his goals.   Pt prognosis is Good.     Pt will continue to benefit from skilled outpatient physical therapy to address the deficits listed in the problem list box on initial evaluation, provide pt/family education and to maximize pt's level of independence in the home and community environment.     Pt's spiritual, cultural and educational needs considered and pt agreeable to plan of care and goals.     Anticipated Barriers for therapy: bipolar disorder, Type II Diabetes (currently uncontrolled), current smoker    Goals:  Short Term Goals: 3 weeks  - The patient will be independent with initial home exercise program. (Progressing, not met)  - Report decreased shoulder pain  <   / =  2 /10  to increase tolerance for adls, work  (Progressing, not met)  - Increase cervical ROM by 5-10 degrees in order to perform ADLs with decreased difficulty.  (Progressing, not met)  - Increase strength in B scapular stabilizers by 1/3 MMT grade to increase tolerance for ADL and work activities.  (Progressing, not met)     Long Term Goals: 6 weeks  - Report decreased neck/shoulder pain  <   / =  2  /10  during aggravating activities to increase tolerance for adls, work.  (Progressing, not met)  - Increased strength in B scapular stabilizers to >/= 4/5 MMT grade to increase tolerance for ADL and work activities.  (Progressing, not met)  - Pt will report at thoraciclevel (27% impaired) on FOTO neck score for neck pain disability to demonstrate decrease in disability and improvement in neck pain.  (Progressing, not met)  - The patient will be independent with home exercise program and symptom management.  (Progressing, not met)      PLAN     Continue to progress postural re-education. Progress towards eventual functional movements and  lifting form at work.     Nikki Quezada, PT, DPT

## 2022-02-28 ENCOUNTER — CLINICAL SUPPORT (OUTPATIENT)
Dept: REHABILITATION | Facility: HOSPITAL | Age: 47
End: 2022-02-28
Payer: COMMERCIAL

## 2022-02-28 DIAGNOSIS — R29.898 DECREASED STRENGTH OF UPPER EXTREMITY: ICD-10-CM

## 2022-02-28 DIAGNOSIS — M25.611 DECREASED RIGHT SHOULDER RANGE OF MOTION: Primary | ICD-10-CM

## 2022-02-28 PROCEDURE — 97112 NEUROMUSCULAR REEDUCATION: CPT

## 2022-02-28 PROCEDURE — 97110 THERAPEUTIC EXERCISES: CPT

## 2022-02-28 NOTE — PROGRESS NOTES
[facility]   Physical Therapy Treatment Note     Name: Pierre Edmonds Jr.  Clinic Number: [id number]    Therapy Diagnosis:   No diagnosis found.  Physician: Jose Sanchez II, MD    Visit Date: 2/28/2022  Physician Orders: PT Eval and Treat   Medical Diagnosis from Referral: M25.512 (ICD-10-CM) - Acute pain of left shoulder  Evaluation Date: 2/16/2022  Authorization Period Expiration: 2/14/2023  Plan of Care Expiration: 03/30/2022  Visit # / Visits authorized: 4/ 10 (+ eval)     Time In: 1210 pm (pt delayed by FOTO survey)  Time Out: 0100 pm  Total Appointment Time: 50 minutes (3 NMR, 1 TE)     Precautions: Standard, bipolar disorder, Type II Diabetes (currently uncontrolled), current smoker  SUBJECTIVE     Pt reports: he feels like everything is getting much better but thinks he slept weird last night and today his clavicle is very achy  He was compliant with home exercise program.  Response to previous treatment: decreased overall pain  Functional change: no pain with driving    Pain: 4/10  Location: left shoulder/ calvicle     OBJECTIVEright      Objective Measures updated at progress report unless specified.     Treatment   Pierre received the treatments listed below:      THERAPEUTIC EXERCISES to develop strength, endurance, ROM, flexibility, posture and core stabilization for 10 minutes including:  - Endurance training with reciprocal motion of all limbs on sci-fit x 6 minutes at level 4.5 at > or equal to 50 spm without rest.   - Thoracic extension over 1/2 foam; 3 min     MANUAL THERAPY TECHNIQUES including Joint mobilizations and Soft tissue Mobilization were applied to L shoulder and c/s for 00 minutes.  - glenohumeral mobs Gr 3-4 (posterior and inferior)  - Mob w/ movement; scaption w/ ER post/inf glide  - prone t/s mobs T7-10- NP    NEUROMUSCULAR RE-EDUCATION ACTIVITIES to improve Balance, Coordination, Kinesthetic, Sense, Proprioception and Posture for 40 minutes.  The  "following were included:    - seated chin tucks w/ scap retraction; 20x w/ 10" hold- manual resistance to low tra    - Box breathing supine on 1/2 foam roll; 4 min - w/ pec stretch  - Wall posture; 2 x 1 min  - Low rows w/ BTB; 2 x 20w/ 5" hold heavy cues to avoid anterior tipping of scap  - shoulder ext, palms forward; GTB; 2 x 20 w/ 5" hold- heavy cues to avoid anterior tipping of scap    - RTC isometrics at wall; IR/ER; 5 x 10" each- TC cues to maintain scap position  - prone row; ecc; 20x w/ 5" ecc; assistance to achieve position   - prone row, conc; 2 x 10 w/ 5" hold    Not today:  - standing median nerve slides; 15x  - Scapular isometrics in SL; 10 x 10" each direction  - prone shoulder horiz abd w/ scap retraction; 2 x 20    Patient Education and Home Exercises     Home Exercises Provided and Patient Education Provided     Education provided:   - appropriate form, sets, and reps for nerve slides to decrease adverse neural tension    Written Home Exercises Provided: Patient instructed to cont prior HEP. Exercises were reviewed and Pierre was able to demonstrate them prior to the end of the session.  Pierre demonstrated good  understanding of the education provided. See EMR under Patient Instructions for exercises provided during therapy sessions. Updated HEP: 2/18/2022    ASSESSMENT   Pierre presents with moderate shoulder/clavicle pain. He demonstrated good carry over parascapular activation, particularly of lower trapezius from last session. He was able to complete prone rows against gravity today for after manual assistance with original positioning. He will benefit continued neuromuscular re-education for control of these muscles followed by gross strengthening of postural support musculature.   Pierre Is progressing well towards his goals.   Pt prognosis is Good.     Pt will continue to benefit from skilled outpatient physical therapy to address the deficits listed in the problem list box on initial " evaluation, provide pt/family education and to maximize pt's level of independence in the home and community environment.     Pt's spiritual, cultural and educational needs considered and pt agreeable to plan of care and goals.     Anticipated Barriers for therapy: bipolar disorder, Type II Diabetes (currently uncontrolled), current smoker    Goals:  Short Term Goals: 3 weeks  - The patient will be independent with initial home exercise program. (Progressing, not met)  - Report decreased shoulder pain  <   / =  2 /10  to increase tolerance for adls, work  (Progressing, not met)  - Increase cervical ROM by 5-10 degrees in order to perform ADLs with decreased difficulty.  (Progressing, not met)  - Increase strength in B scapular stabilizers by 1/3 MMT grade to increase tolerance for ADL and work activities.  (Progressing, not met)     Long Term Goals: 6 weeks  - Report decreased neck/shoulder pain  <   / =  2  /10  during aggravating activities to increase tolerance for adls, work.  (Progressing, not met)  - Increased strength in B scapular stabilizers to >/= 4/5 MMT grade to increase tolerance for ADL and work activities.  (Progressing, not met)  - Pt will report at thoraciclevel (27% impaired) on FOTO neck score for neck pain disability to demonstrate decrease in disability and improvement in neck pain.  (Progressing, not met)  - The patient will be independent with home exercise program and symptom management.  (Progressing, not met)      PLAN     Continue to progress postural re-education. Progress towards eventual functional movements and lifting form at work.     Nikki Quezada, PT, DPT

## 2022-03-03 ENCOUNTER — CLINICAL SUPPORT (OUTPATIENT)
Dept: REHABILITATION | Facility: HOSPITAL | Age: 47
End: 2022-03-03
Payer: COMMERCIAL

## 2022-03-03 DIAGNOSIS — R29.898 DECREASED STRENGTH OF UPPER EXTREMITY: ICD-10-CM

## 2022-03-03 DIAGNOSIS — M25.611 DECREASED RIGHT SHOULDER RANGE OF MOTION: Primary | ICD-10-CM

## 2022-03-03 PROCEDURE — 97112 NEUROMUSCULAR REEDUCATION: CPT

## 2022-03-03 PROCEDURE — 97110 THERAPEUTIC EXERCISES: CPT

## 2022-03-03 NOTE — PROGRESS NOTES
[facility]   Physical Therapy Treatment Note     Name: Pierre Edmonds Jr.  Clinic Number: [id number]    Therapy Diagnosis:   Encounter Diagnoses   Name Primary?    Decreased right shoulder range of motion Yes    Decreased strength of upper extremity      Physician: Jose Sanchez II, MD    Visit Date: 3/3/2022  Physician Orders: PT Eval and Treat   Medical Diagnosis from Referral: M25.512 (ICD-10-CM) - Acute pain of left shoulder  Evaluation Date: 2/16/2022  Authorization Period Expiration: 2/14/2023  Plan of Care Expiration: 03/30/2022  Visit # / Visits authorized: 5/ 10 (+ eval)     Time In: 1100 am  Time Out: 1155 pm  Total Appointment Time: 55 minutes (3 NMR, 1 TE)     Precautions: Standard, bipolar disorder, Type II Diabetes (currently uncontrolled), current smoker  SUBJECTIVE     Pt reports: he's feeling good and has less pain than he's had in a long time  He was compliant with home exercise program.  Response to previous treatment: decreased overall pain  Functional change: no pain with driving    Pain: 0/10  Location: left shoulder/ calvicle     OBJECTIVEright      Objective Measures updated at progress report unless specified.     3/3/22  Active Range of Motion:   Shoulder Left Right   Flexion 154 180 @IE   Abduction 133 178 @IE   ER Functional T3 T2 @IE   IR Functional  T12 L1 @IE         Treatment   Pierre received the treatments listed below:      THERAPEUTIC EXERCISES to develop strength, endurance, ROM, flexibility, posture and core stabilization for 10 minutes including:  - Endurance training with reciprocal motion of all limbs on sci-fit x 8 minutes at level 5.0 at > or equal to 50 spm without rest.   - Thoracic extension over 1/2 foam; 3 min     MANUAL THERAPY TECHNIQUES including Joint mobilizations and Soft tissue Mobilization were applied to L shoulder and c/s for 00 minutes.  - glenohumeral mobs Gr 3-4 (posterior and inferior)  - Mob w/ movement; scaption w/ ER  "post/inf glide  - prone t/s mobs T7-10- NP    NEUROMUSCULAR RE-EDUCATION ACTIVITIES to improve Balance, Coordination, Kinesthetic, Sense, Proprioception and Posture for 45 minutes.  The following were included:    - standing median nerve slides; 15x  - seated chin tucks w/ scap retraction; 20x w/ 10" hold- manual resistance to low trap    - Box breathing supine on 1/2 foam roll; 4 min - w/ pec stretch  - Wall posture; 2 x 1 min  - Low rows w/ BTB; 3 x 15 w/ 5" hold heavy cues to avoid anterior tipping of scap  - shoulder ext, palms forward; GTB; 3 x 15 w/ 5" hold- heavy cues to avoid anterior tipping of scap    - RTC isometrics at wall; IR/ER; 10 x 10" each- TC cues to maintain scap position  - prone shoulder horiz abd w/ scap retraction; 2 x 20  - prone row, conc; 2 x 10 w/ 5" hold    Not today:  - Scapular isometrics in SL; 10 x 10" each direction      Patient Education and Home Exercises     Home Exercises Provided and Patient Education Provided     Education provided:   - appropriate form, sets, and reps for nerve slides to decrease adverse neural tension    Written Home Exercises Provided: Patient instructed to cont prior HEP. Exercises were reviewed and Pierre was able to demonstrate them prior to the end of the session.  Pierre demonstrated good  understanding of the education provided. See EMR under Patient Instructions for exercises provided during therapy sessions. Updated HEP: 2/18/2022    ASSESSMENT   Pierre decreased shoulder/clavicle pain and grossly improved shoulder range of motion. He is improving well in self-mobilization of t/s and postural corrections. His neuromuscular control over his lower parascapular musculature is also greatly improving. He will benefit continued progression in this plan of care.     Pierre Is progressing well towards his goals.   Pt prognosis is Good.     Pt will continue to benefit from skilled outpatient physical therapy to address the deficits listed in the problem " list box on initial evaluation, provide pt/family education and to maximize pt's level of independence in the home and community environment.     Pt's spiritual, cultural and educational needs considered and pt agreeable to plan of care and goals.     Anticipated Barriers for therapy: bipolar disorder, Type II Diabetes (currently uncontrolled), current smoker    Goals:  Short Term Goals: 3 weeks  - The patient will be independent with initial home exercise program. (Progressing, not met)  - Report decreased shoulder pain  <   / =  2 /10  to increase tolerance for adls, work  (Progressing, not met)  - Increase cervical ROM by 5-10 degrees in order to perform ADLs with decreased difficulty.  (Progressing, not met)  - Increase strength in B scapular stabilizers by 1/3 MMT grade to increase tolerance for ADL and work activities.  (Progressing, not met)     Long Term Goals: 6 weeks  - Report decreased neck/shoulder pain  <   / =  2  /10  during aggravating activities to increase tolerance for adls, work.  (Progressing, not met)  - Increased strength in B scapular stabilizers to >/= 4/5 MMT grade to increase tolerance for ADL and work activities.  (Progressing, not met)  - Pt will report at thoraciclevel (27% impaired) on FOTO neck score for neck pain disability to demonstrate decrease in disability and improvement in neck pain.  (Progressing, not met)  - The patient will be independent with home exercise program and symptom management.  (Progressing, not met)      PLAN     Continue to progress postural re-education. Progress towards eventual functional movements and lifting form at work.     Nikki Quezada, PT, DPT

## 2022-03-07 ENCOUNTER — CLINICAL SUPPORT (OUTPATIENT)
Dept: REHABILITATION | Facility: HOSPITAL | Age: 47
End: 2022-03-07
Payer: COMMERCIAL

## 2022-03-07 DIAGNOSIS — R29.898 DECREASED STRENGTH OF UPPER EXTREMITY: ICD-10-CM

## 2022-03-07 DIAGNOSIS — M25.611 DECREASED RIGHT SHOULDER RANGE OF MOTION: Primary | ICD-10-CM

## 2022-03-07 PROCEDURE — 97110 THERAPEUTIC EXERCISES: CPT

## 2022-03-07 PROCEDURE — 97112 NEUROMUSCULAR REEDUCATION: CPT

## 2022-03-07 NOTE — PROGRESS NOTES
[facility]   Physical Therapy Treatment Note     Name: Pierre Edmonds Jr.  Clinic Number: [id number]    Therapy Diagnosis:   Encounter Diagnoses   Name Primary?    Decreased right shoulder range of motion Yes    Decreased strength of upper extremity      Physician: Jose Sanchez II, MD    Visit Date: 3/7/2022  Physician Orders: PT Eval and Treat   Medical Diagnosis from Referral: M25.512 (ICD-10-CM) - Acute pain of left shoulder  Evaluation Date: 2/16/2022  Authorization Period Expiration: 2/14/2023  Plan of Care Expiration: 03/30/2022  Visit # / Visits authorized: 6/ 10 (+ eval)     Time In: 1200 pm  Time Out: 1255 pm  Total Time: 55 minutes   Total Billable Time: 55 minutes (1 TE, 3 NMR)     Precautions: Standard, bipolar disorder, Type II Diabetes (currently uncontrolled), current smoker  SUBJECTIVE     Pt reports: he is continuing to have no pain and overall is doing well  He was compliant with home exercise program.  Response to previous treatment: decreased overall pain  Functional change: no pain with driving    Pain: 0/10  Location: left shoulder/ calvicle     OBJECTIVEright      Objective Measures updated at progress report unless specified.     3/3/22  Active Range of Motion:   Shoulder Left Right   Flexion 154 180 @IE   Abduction 133 178 @IE   ER Functional T3 T2 @IE   IR Functional  T12 L1 @IE     3/7/22  Pain/Dysfunction with Movement     Flexion 50/45 none   Extension 50/90 Hinge point at mid cervical   Right side bending 36/40 none   Left side bending 42/40  none   Right rotation 65/90  none   Left rotation 65/90  none            Treatment   Pierre received the treatments listed below:      THERAPEUTIC EXERCISES to develop strength, endurance, ROM, flexibility, posture and core stabilization for 15 minutes including:  - Endurance training with reciprocal motion of all limbs on sci-fit x 8 minutes at level 5.0 at > or equal to 50 spm without rest.   - Thoracic  "extension over 1/2 foam; 3 min   - supine chin tuck and lift; 5 x 5 w/ 5" hold    MANUAL THERAPY TECHNIQUES including Joint mobilizations and Soft tissue Mobilization were applied to L shoulder and c/s for 00 minutes.  - glenohumeral mobs Gr 3-4 (posterior and inferior)  - Mob w/ movement; scaption w/ ER post/inf glide  - prone t/s mobs T7-10- NP    NEUROMUSCULAR RE-EDUCATION ACTIVITIES to improve Balance, Coordination, Kinesthetic, Sense, Proprioception and Posture for 40 minutes.  The following were included:    - seated chin tucks w/ scap retraction; 20x w/ 10" hold- manual resistance to low trap  - prone shoulder horiz abd w/ scap retraction; 3 x 10  - prone row, 3 x 10 w/ 3# dumbbell  5" hold    - Box breathing supine on 1/2 foam roll; 4 min - w/ pec stretch  - Wall posture; 2 x 1 min  - Low rows w/ BTB; 3 x 15 w/ 5" hold heavy cues to avoid anterior tipping of scap  - shoulder ext, palms forward; GTB; 3 x 15 w/ 5" hold- heavy cues to avoid anterior tipping of scap  - RTC isometrics at wall; IR/ER; 10 x 10" each- TC cues to maintain scap position    Not today:  - Scapular isometrics in SL; 10 x 10" each direction  - standing median nerve slides; 15x    Patient Education and Home Exercises     Home Exercises Provided and Patient Education Provided     Education provided:   - appropriate form, sets, and reps for nerve slides to decrease adverse neural tension    Written Home Exercises Provided: Patient instructed to cont prior HEP. Exercises were reviewed and Pierre was able to demonstrate them prior to the end of the session.  Pierre demonstrated good  understanding of the education provided. See EMR under Patient Instructions for exercises provided during therapy sessions. Updated HEP: 2/18/2022    ASSESSMENT   Pierre reports continued lack of pain and gross improvement in function. His cervical motion is also improving with no pain elicited with previously aggravating motions. He is progressing well with " parascapular control but is independent with his HEP and verbalized willingness to continue this independently. Plan made for discharge next session with updated HEP barring other complications.     Pierre Is progressing well towards his goals.   Pt prognosis is Good.     Pt will continue to benefit from skilled outpatient physical therapy to address the deficits listed in the problem list box on initial evaluation, provide pt/family education and to maximize pt's level of independence in the home and community environment.     Pt's spiritual, cultural and educational needs considered and pt agreeable to plan of care and goals.     Anticipated Barriers for therapy: bipolar disorder, Type II Diabetes (currently uncontrolled), current smoker    Goals:  Short Term Goals: 3 weeks  - The patient will be independent with initial home exercise program. MET 3/7/22  - Report decreased shoulder pain  <   / =  2 /10  to increase tolerance for adls, work  MET 3/7/22  - Increase cervical ROM by 5-10 degrees in order to perform ADLs with decreased difficulty.  (Progressing, not met)  - Increase strength in B scapular stabilizers by 1/3 MMT grade to increase tolerance for ADL and work activities.  (Progressing, not met)     Long Term Goals: 6 weeks  - Report decreased neck/shoulder pain  <   / =  2  /10  during aggravating activities to increase tolerance for adls, work. MET 3/7/22  - Increased strength in B scapular stabilizers to >/= 4/5 MMT grade to increase tolerance for ADL and work activities.  (Progressing, not met)  - Pt will report at thoraciclevel (27% impaired) on FOTO neck score for neck pain disability to demonstrate decrease in disability and improvement in neck pain.  (Progressing, not met)  - The patient will be independent with home exercise program and symptom management.  (Progressing, not met)      PLAN     Reassess parascapular strength next session and d/c    Nikki Quezada, PT, DPT

## 2022-03-16 ENCOUNTER — PATIENT MESSAGE (OUTPATIENT)
Dept: ADMINISTRATIVE | Facility: HOSPITAL | Age: 47
End: 2022-03-16
Payer: COMMERCIAL

## 2022-03-18 ENCOUNTER — CLINICAL SUPPORT (OUTPATIENT)
Dept: REHABILITATION | Facility: HOSPITAL | Age: 47
End: 2022-03-18
Payer: COMMERCIAL

## 2022-03-18 DIAGNOSIS — M25.611 DECREASED RIGHT SHOULDER RANGE OF MOTION: Primary | ICD-10-CM

## 2022-03-18 DIAGNOSIS — R29.898 DECREASED STRENGTH OF UPPER EXTREMITY: ICD-10-CM

## 2022-03-18 PROCEDURE — 97110 THERAPEUTIC EXERCISES: CPT

## 2022-03-18 NOTE — PROGRESS NOTES
[facility]   Physical Therapy Treatment/ DischargeNote     Name: Pierre Edmonds Jr.  Clinic Number: [id number]    Therapy Diagnosis:   Encounter Diagnoses   Name Primary?    Decreased right shoulder range of motion Yes    Decreased strength of upper extremity      Physician: Jose Sanchez II, MD    Visit Date: 3/18/2022  Physician Orders: PT Eval and Treat   Medical Diagnosis from Referral: M25.512 (ICD-10-CM) - Acute pain of left shoulder  Evaluation Date: 2/16/2022  Authorization Period Expiration: 2/14/2023  Plan of Care Expiration: 03/30/2022  Visit # / Visits authorized: 6/ 10 (+ eval)     Time In: 1000 am  Time Out: 1024 am  Total Time: 24 minutes   Total Billable Time: 24 minutes (2 TE)     Precautions: Standard, bipolar disorder, Type II Diabetes (currently uncontrolled), current smoker  SUBJECTIVE     Pt reports: he is continuing to have no pain and overall is doing well  He was compliant with home exercise program.  Response to previous treatment: decreased overall pain  Functional change: no pain with driving    Pain: 0/10  Location: left shoulder/ calvicle     OBJECTIVEright      Objective Measures updated at progress report unless specified.      3/18/2022      Upper Extremity Strength    (L) UE (R) UE   Shoulder flexion: 5/5 5/5   Shoulder Abduction: 5/5 5/5   Shoulder ER 4+/5 4+/5   Shoulder IR 4+/5 4+/5   Lower Trap 4+/5 4+/5   Middle Trap 4+/5 4+/5   Serratus anterior 4/5 4/5   Rhomboids 4/5 4+/5          Special Tests:  AC Joint Left Right   AC Joint Compression Test - -   Empty Can Test - -   Drop Arm test - -   Subscaputlaris Lift Off - -   Clunk test - -   O'aidan's test - -   Hawkin's Kenndy - -   Neer's Test - -   Speed's test - -   Anterior Apprehension test - -   Posterior Apprehension test - -   Sulcus Sign - -          Treatment   Pierre received the treatments listed below:      THERAPEUTIC EXERCISES to develop strength, endurance, ROM, flexibility,  "posture and core stabilization for 24 minutes including updated measurements and discharge education:  - Endurance training with reciprocal motion of all limbs on sci-fit x 8 minutes at level 5.0 at > or equal to 50 spm without rest.   - performed 1 set of all discharge HEP listed in patient instructions    MANUAL THERAPY TECHNIQUES including Joint mobilizations and Soft tissue Mobilization were applied to L shoulder and c/s for 00 minutes.  - glenohumeral mobs Gr 3-4 (posterior and inferior)  - Mob w/ movement; scaption w/ ER post/inf glide  - prone t/s mobs T7-10- NP    NEUROMUSCULAR RE-EDUCATION ACTIVITIES to improve Balance, Coordination, Kinesthetic, Sense, Proprioception and Posture for 00 minutes.  The following were included:    - seated chin tucks w/ scap retraction; 20x w/ 10" hold- manual resistance to low trap  - prone shoulder horiz abd w/ scap retraction; 3 x 10  - prone row, 3 x 10 w/ 3# dumbbell  5" hold    - Box breathing supine on 1/2 foam roll; 4 min - w/ pec stretch  - Wall posture; 2 x 1 min  - Low rows w/ BTB; 3 x 15 w/ 5" hold heavy cues to avoid anterior tipping of scap  - shoulder ext, palms forward; GTB; 3 x 15 w/ 5" hold- heavy cues to avoid anterior tipping of scap  - RTC isometrics at wall; IR/ER; 10 x 10" each- TC cues to maintain scap position    Not today:  - Scapular isometrics in SL; 10 x 10" each direction  - standing median nerve slides; 15x    Patient Education and Home Exercises     Home Exercises Provided and Patient Education Provided     Education provided:   - discharge HEP    Written Home Exercises Provided: Patient instructed to cont prior HEP. Exercises were reviewed and Pierre was able to demonstrate them prior to the end of the session.  Pierre demonstrated good  understanding of the education provided. See EMR under Patient Instructions for exercises provided during therapy sessions. Updated HEP: 2/18/2022, 3/18/2022    ASSESSMENT   Pierre reports improved function, " no pain, and independence in home exercise program. He demonstrated good form in updated HEP and overall is greatly improved compared to start of therapy. He was agreeable to discharge at this time and was given updated HEP for discharge and all questions answered.     Pierre Is progressing well towards his goals.   Pt prognosis is Good.     Pt will continue to benefit from skilled outpatient physical therapy to address the deficits listed in the problem list box on initial evaluation, provide pt/family education and to maximize pt's level of independence in the home and community environment.     Pt's spiritual, cultural and educational needs considered and pt agreeable to plan of care and goals.     Anticipated Barriers for therapy: bipolar disorder, Type II Diabetes (currently uncontrolled), current smoker    Goals:  Short Term Goals: 3 weeks  - The patient will be independent with initial home exercise program. MET 3/7/22  - Report decreased shoulder pain  <   / =  2 /10  to increase tolerance for adls, work  MET 3/7/22  - Increase cervical ROM by 5-10 degrees in order to perform ADLs with decreased difficulty MET 3/7/22  - Increase strength in B scapular stabilizers by 1/3 MMT grade to increase tolerance for ADL and work activities.   MET 3/18/22     Long Term Goals: 6 weeks  - Report decreased neck/shoulder pain  <   / =  2  /10  during aggravating activities to increase tolerance for adls, work. MET 3/7/22  - Increased strength in B scapular stabilizers to >/= 4/5 MMT grade to increase tolerance for ADL and work activities.  MET 3/18/22  - Pt will report at thoraciclevel (27% impaired) on FOTO neck score for neck pain disability to demonstrate decrease in disability and improvement in neck pain.  Partially met, 30% limiation  - The patient will be independent with home exercise program and symptom management.  MET 3/18/22      PLAN     Discharge at this time    Nikki Quezada, PT, DPT

## 2022-03-22 ENCOUNTER — PATIENT MESSAGE (OUTPATIENT)
Dept: INTERNAL MEDICINE | Facility: CLINIC | Age: 47
End: 2022-03-22
Payer: COMMERCIAL

## 2022-03-23 PROBLEM — M25.611 DECREASED RIGHT SHOULDER RANGE OF MOTION: Status: RESOLVED | Noted: 2022-02-18 | Resolved: 2022-03-23

## 2022-03-23 PROBLEM — R29.898 DECREASED STRENGTH OF UPPER EXTREMITY: Status: RESOLVED | Noted: 2022-02-18 | Resolved: 2022-03-23

## 2022-04-19 ENCOUNTER — PATIENT MESSAGE (OUTPATIENT)
Dept: ADMINISTRATIVE | Facility: HOSPITAL | Age: 47
End: 2022-04-19
Payer: COMMERCIAL

## 2022-04-27 DIAGNOSIS — E11.9 TYPE 2 DIABETES MELLITUS WITHOUT COMPLICATION: ICD-10-CM

## 2022-05-02 ENCOUNTER — PATIENT MESSAGE (OUTPATIENT)
Dept: INTERNAL MEDICINE | Facility: CLINIC | Age: 47
End: 2022-05-02
Payer: COMMERCIAL

## 2022-05-02 DIAGNOSIS — Z79.4 CONTROLLED TYPE 2 DIABETES MELLITUS WITHOUT COMPLICATION, WITH LONG-TERM CURRENT USE OF INSULIN: Primary | ICD-10-CM

## 2022-05-02 DIAGNOSIS — E11.9 CONTROLLED TYPE 2 DIABETES MELLITUS WITHOUT COMPLICATION, WITH LONG-TERM CURRENT USE OF INSULIN: Primary | ICD-10-CM

## 2022-05-20 DIAGNOSIS — Z79.4 CONTROLLED TYPE 2 DIABETES MELLITUS WITHOUT COMPLICATION, WITH LONG-TERM CURRENT USE OF INSULIN: ICD-10-CM

## 2022-05-20 DIAGNOSIS — E11.9 CONTROLLED TYPE 2 DIABETES MELLITUS WITHOUT COMPLICATION, WITH LONG-TERM CURRENT USE OF INSULIN: ICD-10-CM

## 2022-05-20 RX ORDER — INSULIN DETEMIR 100 [IU]/ML
18 INJECTION, SOLUTION SUBCUTANEOUS NIGHTLY
Qty: 18 ML | Refills: 3 | Status: SHIPPED | OUTPATIENT
Start: 2022-05-20 | End: 2022-12-09 | Stop reason: SDUPTHER

## 2022-05-20 NOTE — TELEPHONE ENCOUNTER
Refill Routing Note   Medication(s) are not appropriate for processing by [facility] for the following reason(s):      - Required laboratory values are outdated    [facility] action(s):  Defer          Medication reconciliation completed: No     Appointments  past 12m or future 3m with PCP    Date Provider   Last Visit   2/14/2022 Jose Sanchez II, MD   Next Visit   Visit date not found Jose Sanchez II, MD   ED visits in past 90 days: 0        Note composed:1:22 PM 05/20/2022

## 2022-05-20 NOTE — TELEPHONE ENCOUNTER
No new care gaps identified.  [facility] Embedded Care Gaps. Reference number: [id number]. 5/20/2022   12:03:13 PM CDT

## 2022-05-30 ENCOUNTER — PATIENT MESSAGE (OUTPATIENT)
Dept: ADMINISTRATIVE | Facility: HOSPITAL | Age: 47
End: 2022-05-30
Payer: COMMERCIAL

## 2022-06-02 ENCOUNTER — PATIENT MESSAGE (OUTPATIENT)
Dept: INTERNAL MEDICINE | Facility: CLINIC | Age: 47
End: 2022-06-02
Payer: COMMERCIAL

## 2022-06-07 ENCOUNTER — PATIENT MESSAGE (OUTPATIENT)
Dept: INTERNAL MEDICINE | Facility: CLINIC | Age: 47
End: 2022-06-07
Payer: COMMERCIAL

## 2022-06-07 DIAGNOSIS — E11.9 CONTROLLED TYPE 2 DIABETES MELLITUS WITHOUT COMPLICATION, WITH LONG-TERM CURRENT USE OF INSULIN: Primary | ICD-10-CM

## 2022-06-07 DIAGNOSIS — Z79.4 CONTROLLED TYPE 2 DIABETES MELLITUS WITHOUT COMPLICATION, WITH LONG-TERM CURRENT USE OF INSULIN: Primary | ICD-10-CM

## 2022-06-08 ENCOUNTER — PATIENT MESSAGE (OUTPATIENT)
Dept: INTERNAL MEDICINE | Facility: CLINIC | Age: 47
End: 2022-06-08
Payer: COMMERCIAL

## 2022-06-08 ENCOUNTER — LAB VISIT (OUTPATIENT)
Dept: LAB | Facility: HOSPITAL | Age: 47
End: 2022-06-08
Attending: INTERNAL MEDICINE
Payer: COMMERCIAL

## 2022-06-08 DIAGNOSIS — Z79.4 CONTROLLED TYPE 2 DIABETES MELLITUS WITHOUT COMPLICATION, WITH LONG-TERM CURRENT USE OF INSULIN: Primary | ICD-10-CM

## 2022-06-08 DIAGNOSIS — E11.9 CONTROLLED TYPE 2 DIABETES MELLITUS WITHOUT COMPLICATION, WITH LONG-TERM CURRENT USE OF INSULIN: ICD-10-CM

## 2022-06-08 DIAGNOSIS — E11.9 TYPE 2 DIABETES MELLITUS WITHOUT COMPLICATION: ICD-10-CM

## 2022-06-08 DIAGNOSIS — E78.2 MIXED HYPERLIPIDEMIA: ICD-10-CM

## 2022-06-08 DIAGNOSIS — Z79.4 CONTROLLED TYPE 2 DIABETES MELLITUS WITHOUT COMPLICATION, WITH LONG-TERM CURRENT USE OF INSULIN: ICD-10-CM

## 2022-06-08 DIAGNOSIS — E11.9 CONTROLLED TYPE 2 DIABETES MELLITUS WITHOUT COMPLICATION, WITH LONG-TERM CURRENT USE OF INSULIN: Primary | ICD-10-CM

## 2022-06-08 LAB
ALBUMIN SERPL BCP-MCNC: 4 G/DL (ref 3.5–5.2)
ALP SERPL-CCNC: 62 U/L (ref 55–135)
ALT SERPL W/O P-5'-P-CCNC: 12 U/L (ref 10–44)
ANION GAP SERPL CALC-SCNC: 8 MMOL/L (ref 8–16)
AST SERPL-CCNC: 17 U/L (ref 10–40)
BILIRUB SERPL-MCNC: 0.4 MG/DL (ref 0.1–1)
BUN SERPL-MCNC: 12 MG/DL (ref 6–20)
CALCIUM SERPL-MCNC: 9.6 MG/DL (ref 8.7–10.5)
CHLORIDE SERPL-SCNC: 104 MMOL/L (ref 95–110)
CHOLEST SERPL-MCNC: 214 MG/DL (ref 120–199)
CHOLEST/HDLC SERPL: 5.1 {RATIO} (ref 2–5)
CO2 SERPL-SCNC: 26 MMOL/L (ref 23–29)
CREAT SERPL-MCNC: 1.2 MG/DL (ref 0.5–1.4)
EST. GFR  (AFRICAN AMERICAN): >60 ML/MIN/1.73 M^2
EST. GFR  (NON AFRICAN AMERICAN): >60 ML/MIN/1.73 M^2
ESTIMATED AVG GLUCOSE: 128 MG/DL (ref 68–131)
GLUCOSE SERPL-MCNC: 152 MG/DL (ref 70–110)
HBA1C MFR BLD: 6.1 % (ref 4–5.6)
HDLC SERPL-MCNC: 42 MG/DL (ref 40–75)
HDLC SERPL: 19.6 % (ref 20–50)
LDLC SERPL CALC-MCNC: 140.8 MG/DL (ref 63–159)
NONHDLC SERPL-MCNC: 172 MG/DL
POTASSIUM SERPL-SCNC: 4.8 MMOL/L (ref 3.5–5.1)
PROT SERPL-MCNC: 6.9 G/DL (ref 6–8.4)
SODIUM SERPL-SCNC: 138 MMOL/L (ref 136–145)
TRIGL SERPL-MCNC: 156 MG/DL (ref 30–150)

## 2022-06-08 PROCEDURE — 80053 COMPREHEN METABOLIC PANEL: CPT | Performed by: INTERNAL MEDICINE

## 2022-06-08 PROCEDURE — 36415 COLL VENOUS BLD VENIPUNCTURE: CPT | Performed by: INTERNAL MEDICINE

## 2022-06-08 PROCEDURE — 80061 LIPID PANEL: CPT | Performed by: INTERNAL MEDICINE

## 2022-06-08 PROCEDURE — 83036 HEMOGLOBIN GLYCOSYLATED A1C: CPT | Performed by: INTERNAL MEDICINE

## 2022-06-08 RX ORDER — ATORVASTATIN CALCIUM 20 MG/1
20 TABLET, FILM COATED ORAL DAILY
Qty: 90 TABLET | Refills: 3 | Status: SHIPPED | OUTPATIENT
Start: 2022-06-08 | End: 2023-08-14 | Stop reason: SDUPTHER

## 2022-07-06 DIAGNOSIS — E11.9 TYPE 2 DIABETES MELLITUS WITHOUT COMPLICATION, UNSPECIFIED WHETHER LONG TERM INSULIN USE: ICD-10-CM

## 2022-07-08 ENCOUNTER — OFFICE VISIT (OUTPATIENT)
Dept: INTERNAL MEDICINE | Facility: CLINIC | Age: 47
End: 2022-07-08
Payer: COMMERCIAL

## 2022-07-08 VITALS
OXYGEN SATURATION: 96 % | HEIGHT: 69 IN | WEIGHT: 159.81 LBS | BODY MASS INDEX: 23.67 KG/M2 | HEART RATE: 65 BPM | SYSTOLIC BLOOD PRESSURE: 106 MMHG | DIASTOLIC BLOOD PRESSURE: 68 MMHG

## 2022-07-08 DIAGNOSIS — Z79.4 CONTROLLED TYPE 2 DIABETES MELLITUS WITHOUT COMPLICATION, WITH LONG-TERM CURRENT USE OF INSULIN: ICD-10-CM

## 2022-07-08 DIAGNOSIS — E11.9 CONTROLLED TYPE 2 DIABETES MELLITUS WITHOUT COMPLICATION, WITH LONG-TERM CURRENT USE OF INSULIN: ICD-10-CM

## 2022-07-08 DIAGNOSIS — M25.512 CHRONIC LEFT SHOULDER PAIN: Primary | ICD-10-CM

## 2022-07-08 DIAGNOSIS — M54.12 CERVICAL RADICULOPATHY: ICD-10-CM

## 2022-07-08 DIAGNOSIS — Z12.11 SCREENING FOR MALIGNANT NEOPLASM OF COLON: ICD-10-CM

## 2022-07-08 DIAGNOSIS — G89.29 CHRONIC LEFT SHOULDER PAIN: Primary | ICD-10-CM

## 2022-07-08 DIAGNOSIS — R06.2 WHEEZING: ICD-10-CM

## 2022-07-08 PROCEDURE — 3061F NEG MICROALBUMINURIA REV: CPT | Mod: CPTII,S$GLB,, | Performed by: INTERNAL MEDICINE

## 2022-07-08 PROCEDURE — 3078F PR MOST RECENT DIASTOLIC BLOOD PRESSURE < 80 MM HG: ICD-10-PCS | Mod: CPTII,S$GLB,, | Performed by: INTERNAL MEDICINE

## 2022-07-08 PROCEDURE — 1160F PR REVIEW ALL MEDS BY PRESCRIBER/CLIN PHARMACIST DOCUMENTED: ICD-10-PCS | Mod: CPTII,S$GLB,, | Performed by: INTERNAL MEDICINE

## 2022-07-08 PROCEDURE — 3044F PR MOST RECENT HEMOGLOBIN A1C LEVEL <7.0%: ICD-10-PCS | Mod: CPTII,S$GLB,, | Performed by: INTERNAL MEDICINE

## 2022-07-08 PROCEDURE — 3044F HG A1C LEVEL LT 7.0%: CPT | Mod: CPTII,S$GLB,, | Performed by: INTERNAL MEDICINE

## 2022-07-08 PROCEDURE — 3066F PR DOCUMENTATION OF TREATMENT FOR NEPHROPATHY: ICD-10-PCS | Mod: CPTII,S$GLB,, | Performed by: INTERNAL MEDICINE

## 2022-07-08 PROCEDURE — 1159F MED LIST DOCD IN RCRD: CPT | Mod: CPTII,S$GLB,, | Performed by: INTERNAL MEDICINE

## 2022-07-08 PROCEDURE — 99999 PR PBB SHADOW E&M-EST. PATIENT-LVL V: ICD-10-PCS | Mod: PBBFAC,,, | Performed by: INTERNAL MEDICINE

## 2022-07-08 PROCEDURE — 3078F DIAST BP <80 MM HG: CPT | Mod: CPTII,S$GLB,, | Performed by: INTERNAL MEDICINE

## 2022-07-08 PROCEDURE — 1159F PR MEDICATION LIST DOCUMENTED IN MEDICAL RECORD: ICD-10-PCS | Mod: CPTII,S$GLB,, | Performed by: INTERNAL MEDICINE

## 2022-07-08 PROCEDURE — 99214 OFFICE O/P EST MOD 30 MIN: CPT | Mod: S$GLB,,, | Performed by: INTERNAL MEDICINE

## 2022-07-08 PROCEDURE — 99999 PR PBB SHADOW E&M-EST. PATIENT-LVL V: CPT | Mod: PBBFAC,,, | Performed by: INTERNAL MEDICINE

## 2022-07-08 PROCEDURE — 3066F NEPHROPATHY DOC TX: CPT | Mod: CPTII,S$GLB,, | Performed by: INTERNAL MEDICINE

## 2022-07-08 PROCEDURE — 3061F PR NEG MICROALBUMINURIA RESULT DOCUMENTED/REVIEW: ICD-10-PCS | Mod: CPTII,S$GLB,, | Performed by: INTERNAL MEDICINE

## 2022-07-08 PROCEDURE — 99214 PR OFFICE/OUTPT VISIT, EST, LEVL IV, 30-39 MIN: ICD-10-PCS | Mod: S$GLB,,, | Performed by: INTERNAL MEDICINE

## 2022-07-08 PROCEDURE — 1160F RVW MEDS BY RX/DR IN RCRD: CPT | Mod: CPTII,S$GLB,, | Performed by: INTERNAL MEDICINE

## 2022-07-08 PROCEDURE — 3074F PR MOST RECENT SYSTOLIC BLOOD PRESSURE < 130 MM HG: ICD-10-PCS | Mod: CPTII,S$GLB,, | Performed by: INTERNAL MEDICINE

## 2022-07-08 PROCEDURE — 3074F SYST BP LT 130 MM HG: CPT | Mod: CPTII,S$GLB,, | Performed by: INTERNAL MEDICINE

## 2022-07-08 PROCEDURE — 3008F BODY MASS INDEX DOCD: CPT | Mod: CPTII,S$GLB,, | Performed by: INTERNAL MEDICINE

## 2022-07-08 PROCEDURE — 3008F PR BODY MASS INDEX (BMI) DOCUMENTED: ICD-10-PCS | Mod: CPTII,S$GLB,, | Performed by: INTERNAL MEDICINE

## 2022-07-08 RX ORDER — ALBUTEROL SULFATE 90 UG/1
2 AEROSOL, METERED RESPIRATORY (INHALATION) EVERY 6 HOURS PRN
Qty: 18 G | Refills: 0 | Status: SHIPPED | OUTPATIENT
Start: 2022-07-08 | End: 2022-07-20 | Stop reason: SDUPTHER

## 2022-07-08 RX ORDER — LAMOTRIGINE 200 MG/1
200 TABLET ORAL 2 TIMES DAILY
COMMUNITY
Start: 2022-06-27

## 2022-07-08 RX ORDER — LURASIDONE HYDROCHLORIDE 60 MG/1
TABLET, FILM COATED ORAL
COMMUNITY
Start: 2022-06-21

## 2022-07-08 NOTE — PROGRESS NOTES
Subjective:       Patient ID: Pierre Edmonds Jr. is a 46 y.o. male.    Chief Complaint:   Shoulder Pain (left)    HPI - Left shoulder pain off and on for a couple of years.  PT helped with ROM, but he still has pain.  Described as sharp/burning; sometimes wakes him from sleep.  Occurs about twice per day.  He points to a circumscribed area on his left deltoid.  He's had c-spine issues before, and he wonders if this is neuropathic.  He's a smoker; occasionally gets wheezy and wants an inhaler to help with that.  Due for colonoscopy, tdap and covid-19 booster.  He has cut down on smoking and continues to try to quit completely.  Last a1c was back at goal.    PMH:  DM2, insulin requiring.  controlled  Bipolar Disorder  History of multiple HNPs with pain  Cigarette smoker  IBS  GERD     Meds:  Reviewed and reconciled in EPIC with patient during visit today.     Review of Systems   Constitutional: Negative for fever.   HENT: Negative for congestion.    Respiratory: Negative for shortness of breath.    Cardiovascular: Negative for chest pain.   Gastrointestinal: Negative for abdominal pain.   Genitourinary: Negative for difficulty urinating.   Musculoskeletal: Positive for arthralgias.   Skin: Negative for rash.   Neurological: Negative for headaches.   Psychiatric/Behavioral: Negative for sleep disturbance.       Objective:      Physical Exam  Constitutional:       General: He is not in acute distress.     Appearance: He is well-developed and normal weight. He is not diaphoretic.      Comments: Lean, well-appearing man with tattoos   HENT:      Head: Normocephalic and atraumatic.   Cardiovascular:      Rate and Rhythm: Normal rate and regular rhythm.      Heart sounds: Normal heart sounds. No murmur heard.    No friction rub. No gallop.   Pulmonary:      Effort: No respiratory distress.      Breath sounds: No wheezing or rales.   Chest:      Chest wall: No tenderness.   Musculoskeletal:      Comments: Bilateral  shoulders with FROM except for lateral flexion on left shoulder.  Bilaterally, rotator cuff intact   Skin:     General: Skin is warm.      Findings: No erythema.   Neurological:      Mental Status: He is alert and oriented to person, place, and time.   Psychiatric:         Thought Content: Thought content normal.         Assessment:       1. Chronic left shoulder pain    2. Wheezing    3. Screening for malignant neoplasm of colon    4. Controlled type 2 diabetes mellitus without complication, with long-term current use of insulin    5. Cervical radiculopathy        Plan:       Pierre was seen today for shoulder pain.    Diagnoses and all orders for this visit:    Chronic left shoulder pain - I agree that this is likely neuropathic.  Will ask pain management to see what they can do for him.  -     Ambulatory referral/consult to Pain Clinic; Future    Wheezing - he definitely should quit smoking and start using albuterol for any wheezing  -     albuterol (PROVENTIL HFA) 90 mcg/actuation inhaler; Inhale 2 puffs into the lungs every 6 (six) hours as needed for Wheezing. Rescue    Screening for malignant neoplasm of colon  -     Case Request Endoscopy: COLONOSCOPY    Controlled type 2 diabetes mellitus without complication, with long-term current use of insulin - well controlled now.  Eye photo  -     Diabetic Eye Screening Photo; Future    Cervical radiculopathy  -     Ambulatory referral/consult to Pain Clinic; Future    rtc prn, or in 5 months    G Jayy Sanchez MD MPH  Staff Internist

## 2022-07-11 ENCOUNTER — PATIENT MESSAGE (OUTPATIENT)
Dept: ADMINISTRATIVE | Facility: HOSPITAL | Age: 47
End: 2022-07-11
Payer: COMMERCIAL

## 2022-07-11 ENCOUNTER — PATIENT OUTREACH (OUTPATIENT)
Dept: ADMINISTRATIVE | Facility: HOSPITAL | Age: 47
End: 2022-07-11
Payer: COMMERCIAL

## 2022-07-11 ENCOUNTER — OFFICE VISIT (OUTPATIENT)
Dept: PAIN MEDICINE | Facility: CLINIC | Age: 47
End: 2022-07-11
Payer: COMMERCIAL

## 2022-07-11 ENCOUNTER — CLINICAL SUPPORT (OUTPATIENT)
Dept: OPTOMETRY | Facility: CLINIC | Age: 47
End: 2022-07-11
Attending: INTERNAL MEDICINE
Payer: COMMERCIAL

## 2022-07-11 VITALS
WEIGHT: 159 LBS | HEIGHT: 69 IN | HEART RATE: 58 BPM | RESPIRATION RATE: 18 BRPM | SYSTOLIC BLOOD PRESSURE: 105 MMHG | DIASTOLIC BLOOD PRESSURE: 66 MMHG | BODY MASS INDEX: 23.55 KG/M2

## 2022-07-11 DIAGNOSIS — Z79.4 CONTROLLED TYPE 2 DIABETES MELLITUS WITHOUT COMPLICATION, WITH LONG-TERM CURRENT USE OF INSULIN: ICD-10-CM

## 2022-07-11 DIAGNOSIS — M25.512 CHRONIC LEFT SHOULDER PAIN: ICD-10-CM

## 2022-07-11 DIAGNOSIS — E11.9 CONTROLLED TYPE 2 DIABETES MELLITUS WITHOUT COMPLICATION, WITH LONG-TERM CURRENT USE OF INSULIN: ICD-10-CM

## 2022-07-11 DIAGNOSIS — G89.29 CHRONIC LEFT SHOULDER PAIN: ICD-10-CM

## 2022-07-11 DIAGNOSIS — S46.012A STRAIN OF TENDON OF LEFT ROTATOR CUFF, INITIAL ENCOUNTER: Primary | ICD-10-CM

## 2022-07-11 DIAGNOSIS — M54.12 CERVICAL RADICULOPATHY: ICD-10-CM

## 2022-07-11 PROCEDURE — 1160F RVW MEDS BY RX/DR IN RCRD: CPT | Mod: CPTII,S$GLB,, | Performed by: STUDENT IN AN ORGANIZED HEALTH CARE EDUCATION/TRAINING PROGRAM

## 2022-07-11 PROCEDURE — 3044F HG A1C LEVEL LT 7.0%: CPT | Mod: CPTII,S$GLB,, | Performed by: STUDENT IN AN ORGANIZED HEALTH CARE EDUCATION/TRAINING PROGRAM

## 2022-07-11 PROCEDURE — 92228 DIABETIC EYE SCREENING PHOTO: ICD-10-PCS | Mod: 26,S$GLB,, | Performed by: OPHTHALMOLOGY

## 2022-07-11 PROCEDURE — 92228 IMG RTA DETC/MNTR DS PHY/QHP: CPT | Mod: TC,S$GLB,, | Performed by: INTERNAL MEDICINE

## 2022-07-11 PROCEDURE — 3078F PR MOST RECENT DIASTOLIC BLOOD PRESSURE < 80 MM HG: ICD-10-PCS | Mod: CPTII,S$GLB,, | Performed by: STUDENT IN AN ORGANIZED HEALTH CARE EDUCATION/TRAINING PROGRAM

## 2022-07-11 PROCEDURE — 3078F DIAST BP <80 MM HG: CPT | Mod: CPTII,S$GLB,, | Performed by: STUDENT IN AN ORGANIZED HEALTH CARE EDUCATION/TRAINING PROGRAM

## 2022-07-11 PROCEDURE — 1159F MED LIST DOCD IN RCRD: CPT | Mod: CPTII,S$GLB,, | Performed by: STUDENT IN AN ORGANIZED HEALTH CARE EDUCATION/TRAINING PROGRAM

## 2022-07-11 PROCEDURE — 1159F PR MEDICATION LIST DOCUMENTED IN MEDICAL RECORD: ICD-10-PCS | Mod: CPTII,S$GLB,, | Performed by: STUDENT IN AN ORGANIZED HEALTH CARE EDUCATION/TRAINING PROGRAM

## 2022-07-11 PROCEDURE — 3074F PR MOST RECENT SYSTOLIC BLOOD PRESSURE < 130 MM HG: ICD-10-PCS | Mod: CPTII,S$GLB,, | Performed by: STUDENT IN AN ORGANIZED HEALTH CARE EDUCATION/TRAINING PROGRAM

## 2022-07-11 PROCEDURE — 99204 OFFICE O/P NEW MOD 45 MIN: CPT | Mod: S$GLB,,, | Performed by: STUDENT IN AN ORGANIZED HEALTH CARE EDUCATION/TRAINING PROGRAM

## 2022-07-11 PROCEDURE — 92228 IMG RTA DETC/MNTR DS PHY/QHP: CPT | Mod: 26,S$GLB,, | Performed by: OPHTHALMOLOGY

## 2022-07-11 PROCEDURE — 99999 PR PBB SHADOW E&M-EST. PATIENT-LVL IV: ICD-10-PCS | Mod: PBBFAC,,, | Performed by: STUDENT IN AN ORGANIZED HEALTH CARE EDUCATION/TRAINING PROGRAM

## 2022-07-11 PROCEDURE — 3008F BODY MASS INDEX DOCD: CPT | Mod: CPTII,S$GLB,, | Performed by: STUDENT IN AN ORGANIZED HEALTH CARE EDUCATION/TRAINING PROGRAM

## 2022-07-11 PROCEDURE — 3008F PR BODY MASS INDEX (BMI) DOCUMENTED: ICD-10-PCS | Mod: CPTII,S$GLB,, | Performed by: STUDENT IN AN ORGANIZED HEALTH CARE EDUCATION/TRAINING PROGRAM

## 2022-07-11 PROCEDURE — 3044F PR MOST RECENT HEMOGLOBIN A1C LEVEL <7.0%: ICD-10-PCS | Mod: CPTII,S$GLB,, | Performed by: STUDENT IN AN ORGANIZED HEALTH CARE EDUCATION/TRAINING PROGRAM

## 2022-07-11 PROCEDURE — 3066F PR DOCUMENTATION OF TREATMENT FOR NEPHROPATHY: ICD-10-PCS | Mod: CPTII,S$GLB,, | Performed by: STUDENT IN AN ORGANIZED HEALTH CARE EDUCATION/TRAINING PROGRAM

## 2022-07-11 PROCEDURE — 3061F NEG MICROALBUMINURIA REV: CPT | Mod: CPTII,S$GLB,, | Performed by: STUDENT IN AN ORGANIZED HEALTH CARE EDUCATION/TRAINING PROGRAM

## 2022-07-11 PROCEDURE — 99204 PR OFFICE/OUTPT VISIT, NEW, LEVL IV, 45-59 MIN: ICD-10-PCS | Mod: S$GLB,,, | Performed by: STUDENT IN AN ORGANIZED HEALTH CARE EDUCATION/TRAINING PROGRAM

## 2022-07-11 PROCEDURE — 3066F NEPHROPATHY DOC TX: CPT | Mod: CPTII,S$GLB,, | Performed by: STUDENT IN AN ORGANIZED HEALTH CARE EDUCATION/TRAINING PROGRAM

## 2022-07-11 PROCEDURE — 99999 PR PBB SHADOW E&M-EST. PATIENT-LVL IV: CPT | Mod: PBBFAC,,, | Performed by: STUDENT IN AN ORGANIZED HEALTH CARE EDUCATION/TRAINING PROGRAM

## 2022-07-11 PROCEDURE — 1160F PR REVIEW ALL MEDS BY PRESCRIBER/CLIN PHARMACIST DOCUMENTED: ICD-10-PCS | Mod: CPTII,S$GLB,, | Performed by: STUDENT IN AN ORGANIZED HEALTH CARE EDUCATION/TRAINING PROGRAM

## 2022-07-11 PROCEDURE — 3074F SYST BP LT 130 MM HG: CPT | Mod: CPTII,S$GLB,, | Performed by: STUDENT IN AN ORGANIZED HEALTH CARE EDUCATION/TRAINING PROGRAM

## 2022-07-11 PROCEDURE — 92228 DIABETIC EYE SCREENING PHOTO: ICD-10-PCS | Mod: TC,S$GLB,, | Performed by: INTERNAL MEDICINE

## 2022-07-11 PROCEDURE — 3061F PR NEG MICROALBUMINURIA RESULT DOCUMENTED/REVIEW: ICD-10-PCS | Mod: CPTII,S$GLB,, | Performed by: STUDENT IN AN ORGANIZED HEALTH CARE EDUCATION/TRAINING PROGRAM

## 2022-07-11 NOTE — PROGRESS NOTES
Chronic Pain - New Consult    Referring Physician: Jose Sanchez II, MD    Date: 07/11/2022     Re: Pierre Edmonds Jr.  MR#: [id number]  YOB: 1975  Age: 46 y.o.    Chief Complaint:   Chief Complaint   Patient presents with    Shoulder Pain     left         **This note is dictated using the M*Modal Fluency Direct word recognition program. There are word recognition mistakes that are occasionally missed on review.**    ASSESSMENT: 46 y.o. year old male with shoulder pain, consistent with     1. Strain of tendon of left rotator cuff, initial encounter  MRI Shoulder Without Contrast Left    X-Ray Shoulder 2 or More Views Left   2. Chronic left shoulder pain  Ambulatory referral/consult to Pain Clinic    MRI Shoulder Without Contrast Left    X-Ray Shoulder 2 or More Views Left   3. Cervical radiculopathy  Ambulatory referral/consult to Pain Clinic         PLAN:     Left shoulder pain / rotator cuff injury  -MRI and XR left shoulder  -failed PT, oral meds  -discussed injection. Patient defers until after imaging  -consider referral to sports medicine  -we discussed the nature of rotator cuff injuries, that the tendons can take a significant amount of time to heal, and that he should continue doing ROM and stretching exercises to maintain mobility.    Tobacco use  -if MRI shows something that needs to be repaired surgically, then the patient will have to stop smoking to increase success chance    - RTC 1 week after MRI  - Counseled patient regarding the importance of activity modification.    The above plan and management options were discussed at length with patient. Patient is in agreement with the above and verbalized understanding. It will be communicated with the referring physician via electronic record, fax, or mail.  Lab/study reports reviewed were important and necessary because subsequent medical and treatment recommendations required review of the above lab/study reports. Images viewed/reviewed  above were important and necessary because subsequent medical and treatment recommendations required review of the reviewed image(s).     Electronically signed by:  Gee Carlisle DO  07/11/2022    =========================================================================================================    SUBJECTIVE:    Pierre Edmonds Jr. is a 46 y.o. male presents to the clinic for the evaluation of left shoulder pain. The pain started 6 + months ago following no inciting event and symptoms have been worsening.  He denies any trauma. He has geo doing PT for 2-3 months and got the ROM back in the shoulder, but the pain has persisted.  His pain is intermittent, and is aggravated by certain movements.  Shoulder extension seems to be what makes the pain better after a few seconds.  He gets it everyday.     Pain Description:    The pain is located in the left shoulder area and does not radiate.  At BEST  0/10   At WORST  9/10 on the WORST day.    On average pain is rated as 5/10.   Today the pain is rated as 1/10  The pain is intermittent.  The pain is described as burning and sharp    Symptoms interfere with daily activity and sleeping.   Exacerbating factors: certain type of movement.    Mitigating factors pushing the shoulder back.   He reports 7 hours of sleep per night.    Physical Therapy/Home Exercise: No, not currently in physical therapy or home exercise program    Current Pain Medications:    - none    Failed Pain Medications:    - tylenol, ibuprofen    Pain Treatment Therapies:    Pain procedures: neck epidurals in 2018  Physical Therapy: completed 2-3 months ago  Chiropractor: none  Acupuncture: none  TENS unit: none  Spinal decompression: none  Joint replacement: none    Patient denies urinary incontinence, bowel incontinence, significant motor weakness and loss of sensations.  Patient denies any suicidal or homicidal ideations     report:  Reviewed and consistent with medication use as  prescribed.    Imaging:   MRI cervical 04/2015:  Technique: Shadow T1, T2 and STIR as well as axial T1 and T2 weighted images were obtained through the cervical spine without contrast.     Findings: There mild Modic degenerative endplate changes at C6-C7 with loss of disk height at this level.  The vertebral bodies otherwise maintain normal height, signal intensity and alignment.  The signal intensity in the cervical spinal cord is normal.    The craniocervical junction shows no abnormalities.  Localizer images show no abnormalities.        C2-C3: No significant disk or joint pathology     C3-C4: Osteophyte formation on the left causes mild left neuroforaminal stenosis.  The right neural foramen and central canal are patent     C4-C5: No significant disk or joint pathology.     C5-C6: Diffuse disk bulge with right disk osteophyte complex causes effacement of the anterior thecal sleeve and mild to moderate right neuroforaminal stenosis.  Mild left neural foraminal stenosis is also present.     C6-C7: Diffuse disk bulge with small osteophytes results in moderate bilateral neural foraminal stenosis and mild central canal stenosis.     C7-T1: No significant disk or joint pathology.  IMPRESSION:    Multilevel degenerative disk and joint disease which is most pronounced at C5-C6 and C6-C7.      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   Tobacco Use    Smoking status: Current Every Day Smoker     Packs/day: 1.00    Smokeless tobacco: Never Used   Substance and Sexual Activity    Alcohol use: No    Drug use: Yes     Types: Marijuana     Comment:  "everyday    Sexual activity: Yes     Partners: Female     Family History   Problem Relation Age of Onset    Diabetes Mother     Hypertension Father     Diabetes Maternal Aunt     Diabetes Maternal Grandmother     Melanoma Neg Hx        Review of patient's allergies indicates:   Allergen Reactions    Ceclor [cefaclor] Hives       Current Outpatient Medications   Medication Sig    albuterol (PROVENTIL HFA) 90 mcg/actuation inhaler Inhale 2 puffs into the lungs every 6 (six) hours as needed for Wheezing. Rescue    atorvastatin (LIPITOR) 20 MG tablet Take 1 tablet (20 mg total) by mouth once daily.    BD ARMANDO 2ND GEN PEN NEEDLE 32 gauge x 5/32" Ndle USE FOR INSULIN INJECTION DAILY    blood sugar diagnostic Strp To check BG 1 times daily, to use with insurance preferred meter    blood-glucose meter kit To check BG 1 times daily, to use with insurance preferred meter    lamoTRIgine (LAMICTAL) 200 MG tablet Take 200 mg by mouth 2 (two) times daily.    lancets Misc To check BG 1 times daily, to use with insurance preferred meter    LATUDA 60 mg Tab tablet SMARTSI Tablet(s) By Mouth Every Evening    LEVEMIR FLEXTOUCH U-100 INSULN 100 unit/mL (3 mL) InPn pen INJECT 18 UNITS INTO THE SKIN EVERY EVENING.    nicotine (NICODERM CQ) 21 mg/24 hr Place 1 patch on dry clean skin in morning    nicotine, polacrilex, (NICORETTE) 2 mg Gum Take 1 each (2 mg total) by mouth as needed (in place of cigarettes 8-10 times aday). Cinnamon flavor    sildenafiL (VIAGRA) 100 MG tablet Take 1 tablet (100 mg total) by mouth daily as needed for Erectile Dysfunction.     No current facility-administered medications for this visit.       REVIEW OF SYSTEMS:    GENERAL:  No weight loss, malaise or fevers.   HEENT:   No recent changes in vision or hearing  NECK:  Negative for lumps, no difficulty with swallowing.  RESPIRATORY:  Negative for cough, wheezing or shortness of breath, patient denies any recent URI.  CARDIOVASCULAR:  " "Negative for chest pain, leg swelling or palpitations.  GI:  Negative for abdominal discomfort, blood in stools or black stools or change in bowel habits.  MUSCULOSKELETAL:  See HPI.  SKIN:  Negative for lesions, rash, and itching.  PSYCH:  No mood disorder or recent psychosocial stressors.  Patients sleep is not disturbed secondary to pain.  HEMATOLOGY/LYMPHOLOGY:  Negative for prolonged bleeding, bruising easily or swollen nodes.  Patient is not currently taking any anti-coagulants  NEURO:   No history of headaches, syncope, paralysis, seizures or tremors.  All other reviewed and negative other than HPI.    OBJECTIVE:    /66   Pulse (!) 58   Resp 18   Ht 5' 9" (1.753 m)   Wt 72.1 kg (159 lb)   BMI 23.48 kg/m²     PHYSICAL EXAMINATION:    GENERAL: Well appearing, in no acute distress, alert and oriented x3.  PSYCH:  Mood and affect appropriate.  SKIN: Skin color, texture, turgor normal, no rashes or lesions.  HEAD/FACE:  Normocephalic, atraumatic. Cranial nerves grossly intact.    NECK:   - Negative pain to palpation over the cervical paraspinous muscles.   - Spurling  Negative.  - Negative pain with neck flexion, extension, or lateral flexion.     CV: RRR with palpation of the radial artery.  PULM: CTAB. No evidence of respiratory difficulty, symmetric chest rise.  GI:  Soft and non-tender.    MUSKULOSKELETAL:    EXTREMITIES:   left Shoulder Exam:    Negative Edema around the shoulder  Negative Erythema around the shoulder  Negative deformity of the shoulder  Negative Scapular winging  Negative atrophy  Positive Tenderness to Palpation at the lateral deltoid.  Range of Motion is restricted  (+) empty can  (+) lift off test    Test Maneuver Positive/Negative Diagnosis suggested   Apley scratch test   positive Loss of range of motion: rotator cuff problem   Neer's Sign  negative Subacromial impingement   Hawkin's Test  negative Supraspinatus tendon impingement   Drop Arm Test Passively abducting the " patient's shoulder, then observing as the patient slowly lowers the arm to the waist negative Rotator cuff tear   Cross Arm test  negative Acromioclavicular joint arthritis   Apprehension Test  not done Anterior glenohumeral instability   Yergason Test  not done Biceps tendon instability or tendonitis   Speed's  not done Biceps tendon instability or tendonitis   Sulcus Sign  negative Inferior glenohumeral instability     MUSCULOSKELETAL:  No atrophy or tone abnormalities are noted in the UE or LE.  No deformities, edema, or skin discoloration are noted on visible skin. Good capillary refill.    NEURO: Bilateral upper and lower extremity coordination and muscle stretch reflexes are physiologic and symmetric.      NEUROLOGICAL EXAM:  MENTAL STATUS: A x O x 3, good concentration, speech is fluent and goal directed  MEMORY: recent and remote are intact  CN: CN2-12 grossly intact  MOTOR: 5/5 in all muscle groups  DTRs: 2+ intact symmetric  Sensation:    -no Loss of sensation in a left upper and right upper C-4, C-5, C-6, C-7 and C-8 bilaterally distribution.    Martini: absent on the bilateral side(s)  Clonus: absent on the bilateral side(s)    GAIT: normal.

## 2022-07-11 NOTE — PROGRESS NOTES
Pierre Edmonds  is a 46 y.o. male here for a diabetic eye screening with non-dilated fundus photos per Dr. Sanchez.    Patient cooperative?: Yes  Small pupils?: Yes  Last eye exam: 4/22/2021    For exam results, see Encounter Report.

## 2022-07-12 ENCOUNTER — PATIENT MESSAGE (OUTPATIENT)
Dept: ADMINISTRATIVE | Facility: HOSPITAL | Age: 47
End: 2022-07-12
Payer: COMMERCIAL

## 2022-07-20 ENCOUNTER — PATIENT MESSAGE (OUTPATIENT)
Dept: INTERNAL MEDICINE | Facility: CLINIC | Age: 47
End: 2022-07-20
Payer: COMMERCIAL

## 2022-07-20 DIAGNOSIS — R06.2 WHEEZING: ICD-10-CM

## 2022-07-20 RX ORDER — ALBUTEROL SULFATE 90 UG/1
2 AEROSOL, METERED RESPIRATORY (INHALATION) EVERY 6 HOURS PRN
Qty: 18 G | Refills: 0 | Status: SHIPPED | OUTPATIENT
Start: 2022-07-20 | End: 2023-09-15

## 2022-07-26 ENCOUNTER — HOSPITAL ENCOUNTER (OUTPATIENT)
Dept: RADIOLOGY | Facility: HOSPITAL | Age: 47
Discharge: HOME | End: 2022-07-26
Attending: STUDENT IN AN ORGANIZED HEALTH CARE EDUCATION/TRAINING PROGRAM
Payer: COMMERCIAL

## 2022-07-26 DIAGNOSIS — M25.512 CHRONIC LEFT SHOULDER PAIN: ICD-10-CM

## 2022-07-26 DIAGNOSIS — S46.012A STRAIN OF TENDON OF LEFT ROTATOR CUFF, INITIAL ENCOUNTER: ICD-10-CM

## 2022-07-26 DIAGNOSIS — G89.29 CHRONIC LEFT SHOULDER PAIN: ICD-10-CM

## 2022-07-26 PROCEDURE — 73030 XR SHOULDER COMPLETE 2 OR MORE VIEWS LEFT: ICD-10-PCS | Mod: 26,LT,, | Performed by: RADIOLOGY

## 2022-07-26 PROCEDURE — 73221 MRI JOINT UPR EXTREM W/O DYE: CPT | Mod: TC,LT

## 2022-07-26 PROCEDURE — 73030 X-RAY EXAM OF SHOULDER: CPT | Mod: TC,LT

## 2022-07-26 PROCEDURE — 73221 MRI SHOULDER WITHOUT CONTRAST LEFT: ICD-10-PCS | Mod: 26,LT,, | Performed by: RADIOLOGY

## 2022-07-26 PROCEDURE — 73221 MRI JOINT UPR EXTREM W/O DYE: CPT | Mod: 26,LT,, | Performed by: RADIOLOGY

## 2022-07-26 PROCEDURE — 73030 X-RAY EXAM OF SHOULDER: CPT | Mod: 26,LT,, | Performed by: RADIOLOGY

## 2022-07-28 ENCOUNTER — TELEPHONE (OUTPATIENT)
Dept: ENDOSCOPY | Facility: HOSPITAL | Age: 47
End: 2022-07-28
Payer: COMMERCIAL

## 2022-08-08 ENCOUNTER — PATIENT OUTREACH (OUTPATIENT)
Dept: ADMINISTRATIVE | Facility: HOSPITAL | Age: 47
End: 2022-08-08
Payer: COMMERCIAL

## 2022-08-08 ENCOUNTER — OFFICE VISIT (OUTPATIENT)
Dept: PAIN MEDICINE | Facility: CLINIC | Age: 47
End: 2022-08-08
Payer: COMMERCIAL

## 2022-08-08 ENCOUNTER — TELEPHONE (OUTPATIENT)
Dept: PAIN MEDICINE | Facility: CLINIC | Age: 47
End: 2022-08-08
Payer: COMMERCIAL

## 2022-08-08 ENCOUNTER — PATIENT MESSAGE (OUTPATIENT)
Dept: ENDOSCOPY | Facility: HOSPITAL | Age: 47
End: 2022-08-08
Payer: COMMERCIAL

## 2022-08-08 VITALS — HEIGHT: 69 IN | WEIGHT: 159 LBS | RESPIRATION RATE: 18 BRPM | BODY MASS INDEX: 23.55 KG/M2

## 2022-08-08 DIAGNOSIS — Z12.11 SPECIAL SCREENING FOR MALIGNANT NEOPLASMS, COLON: Primary | ICD-10-CM

## 2022-08-08 DIAGNOSIS — M67.814 TENDINOSIS OF LEFT SHOULDER: ICD-10-CM

## 2022-08-08 DIAGNOSIS — S46.012A STRAIN OF TENDON OF LEFT ROTATOR CUFF, INITIAL ENCOUNTER: ICD-10-CM

## 2022-08-08 DIAGNOSIS — M25.512 CHRONIC LEFT SHOULDER PAIN: Primary | ICD-10-CM

## 2022-08-08 DIAGNOSIS — G89.29 CHRONIC LEFT SHOULDER PAIN: Primary | ICD-10-CM

## 2022-08-08 DIAGNOSIS — G89.29 CHRONIC LEFT SHOULDER PAIN: ICD-10-CM

## 2022-08-08 DIAGNOSIS — M75.52 SUBACROMIAL BURSITIS OF LEFT SHOULDER JOINT: Primary | ICD-10-CM

## 2022-08-08 DIAGNOSIS — M25.512 CHRONIC LEFT SHOULDER PAIN: ICD-10-CM

## 2022-08-08 DIAGNOSIS — M54.12 CERVICAL RADICULOPATHY: ICD-10-CM

## 2022-08-08 PROCEDURE — 1160F PR REVIEW ALL MEDS BY PRESCRIBER/CLIN PHARMACIST DOCUMENTED: ICD-10-PCS | Mod: CPTII,S$GLB,, | Performed by: STUDENT IN AN ORGANIZED HEALTH CARE EDUCATION/TRAINING PROGRAM

## 2022-08-08 PROCEDURE — 3066F PR DOCUMENTATION OF TREATMENT FOR NEPHROPATHY: ICD-10-PCS | Mod: CPTII,S$GLB,, | Performed by: STUDENT IN AN ORGANIZED HEALTH CARE EDUCATION/TRAINING PROGRAM

## 2022-08-08 PROCEDURE — 3008F BODY MASS INDEX DOCD: CPT | Mod: CPTII,S$GLB,, | Performed by: STUDENT IN AN ORGANIZED HEALTH CARE EDUCATION/TRAINING PROGRAM

## 2022-08-08 PROCEDURE — 99999 PR PBB SHADOW E&M-EST. PATIENT-LVL IV: CPT | Mod: PBBFAC,,, | Performed by: STUDENT IN AN ORGANIZED HEALTH CARE EDUCATION/TRAINING PROGRAM

## 2022-08-08 PROCEDURE — 1159F MED LIST DOCD IN RCRD: CPT | Mod: CPTII,S$GLB,, | Performed by: STUDENT IN AN ORGANIZED HEALTH CARE EDUCATION/TRAINING PROGRAM

## 2022-08-08 PROCEDURE — 3061F NEG MICROALBUMINURIA REV: CPT | Mod: CPTII,S$GLB,, | Performed by: STUDENT IN AN ORGANIZED HEALTH CARE EDUCATION/TRAINING PROGRAM

## 2022-08-08 PROCEDURE — 1159F PR MEDICATION LIST DOCUMENTED IN MEDICAL RECORD: ICD-10-PCS | Mod: CPTII,S$GLB,, | Performed by: STUDENT IN AN ORGANIZED HEALTH CARE EDUCATION/TRAINING PROGRAM

## 2022-08-08 PROCEDURE — 99214 OFFICE O/P EST MOD 30 MIN: CPT | Mod: S$GLB,,, | Performed by: STUDENT IN AN ORGANIZED HEALTH CARE EDUCATION/TRAINING PROGRAM

## 2022-08-08 PROCEDURE — 3066F NEPHROPATHY DOC TX: CPT | Mod: CPTII,S$GLB,, | Performed by: STUDENT IN AN ORGANIZED HEALTH CARE EDUCATION/TRAINING PROGRAM

## 2022-08-08 PROCEDURE — 3008F PR BODY MASS INDEX (BMI) DOCUMENTED: ICD-10-PCS | Mod: CPTII,S$GLB,, | Performed by: STUDENT IN AN ORGANIZED HEALTH CARE EDUCATION/TRAINING PROGRAM

## 2022-08-08 PROCEDURE — 3061F PR NEG MICROALBUMINURIA RESULT DOCUMENTED/REVIEW: ICD-10-PCS | Mod: CPTII,S$GLB,, | Performed by: STUDENT IN AN ORGANIZED HEALTH CARE EDUCATION/TRAINING PROGRAM

## 2022-08-08 PROCEDURE — 99214 PR OFFICE/OUTPT VISIT, EST, LEVL IV, 30-39 MIN: ICD-10-PCS | Mod: S$GLB,,, | Performed by: STUDENT IN AN ORGANIZED HEALTH CARE EDUCATION/TRAINING PROGRAM

## 2022-08-08 PROCEDURE — 1160F RVW MEDS BY RX/DR IN RCRD: CPT | Mod: CPTII,S$GLB,, | Performed by: STUDENT IN AN ORGANIZED HEALTH CARE EDUCATION/TRAINING PROGRAM

## 2022-08-08 PROCEDURE — 99999 PR PBB SHADOW E&M-EST. PATIENT-LVL IV: ICD-10-PCS | Mod: PBBFAC,,, | Performed by: STUDENT IN AN ORGANIZED HEALTH CARE EDUCATION/TRAINING PROGRAM

## 2022-08-08 PROCEDURE — 3044F PR MOST RECENT HEMOGLOBIN A1C LEVEL <7.0%: ICD-10-PCS | Mod: CPTII,S$GLB,, | Performed by: STUDENT IN AN ORGANIZED HEALTH CARE EDUCATION/TRAINING PROGRAM

## 2022-08-08 PROCEDURE — 3044F HG A1C LEVEL LT 7.0%: CPT | Mod: CPTII,S$GLB,, | Performed by: STUDENT IN AN ORGANIZED HEALTH CARE EDUCATION/TRAINING PROGRAM

## 2022-08-08 RX ORDER — POLYETHYLENE GLYCOL 3350, SODIUM SULFATE ANHYDROUS, SODIUM BICARBONATE, SODIUM CHLORIDE, POTASSIUM CHLORIDE 236; 22.74; 6.74; 5.86; 2.97 G/4L; G/4L; G/4L; G/4L; G/4L
4 POWDER, FOR SOLUTION ORAL ONCE
Qty: 4000 ML | Refills: 0 | Status: SHIPPED | OUTPATIENT
Start: 2022-08-08 | End: 2022-08-08

## 2022-08-08 NOTE — PROGRESS NOTES
Chart review for overdue colon cancer screening. Mr. Edmonds is schedule for a colonoscopy on 8.19.2022

## 2022-08-08 NOTE — PROGRESS NOTES
Chronic Pain - f/u    Referring Physician: No ref. provider found    Date: 08/08/2022     Re: Pierre Edmonds Jr.  MR#: [id number]  YOB: 1975  Age: 47 y.o.    Chief Complaint:   Chief Complaint   Patient presents with    Shoulder Pain         **This note is dictated using the M*Modal Fluency Direct word recognition program. There are word recognition mistakes that are occasionally missed on review.**    ASSESSMENT: 47 y.o. year old male with shoulder pain, consistent with     1. Subacromial bursitis of left shoulder joint     2. Tendinosis of left shoulder     3. Chronic left shoulder pain     4. Strain of tendon of left rotator cuff, initial encounter           PLAN:     Left shoulder pain / rotator cuff tendinosis / bursitis  -MRI and XR left shoulder - small partial thickness tear of supraspinatus tendon, insfraspinatus tendinosis, subscao tendinosis. Subacromial bursitis. Moderate AC joint arthropathy. Small labral tears.  -failed PT, oral meds  -discussed injection. Patient would like to schedule left subacromial bursa injection. Risks, benefits, and alternatives were discussed with the patient, and He would like to proceed.    Tobacco use  -if MRI shows something that needs to be repaired surgically, then the patient will have to stop smoking to increase success chance    - RTC 3 weeks after injection  - Counseled patient regarding the importance of activity modification.    The above plan and management options were discussed at length with patient. Patient is in agreement with the above and verbalized understanding. It will be communicated with the referring physician via electronic record, fax, or mail.  Lab/study reports reviewed were important and necessary because subsequent medical and treatment recommendations required review of the above lab/study reports. Images viewed/reviewed above were important and necessary because subsequent medical and treatment recommendations required review of  the reviewed image(s).     Electronically signed by:  Gee Carlisle DO  08/08/2022    =========================================================================================================    SUBJECTIVE:    Interval History 8/8/2022:     Pierre Edmonds Jr. is a 47 y.o. male presents to the clinic for follow up.  Since last visit the pain has is unchanged.    Had MRI left shoulder:    Impression:   Small partial-thickness rotator cuff tear with tendinosis, as above.  Small labral tears, as above.   Moderate AC joint arthropathy.   Small amount of subdeltoid fluid/ bursitis.    The pain is located in the left shoulder area and radiates to the upper arm.  The pain is described as burning and sharp    At BEST  0/10   At WORST  9/10 on the WORST day.    On average pain is rated as 5/10.   Today the pain is rated as 0/10  Symptoms interfere with daily activity and sleeping.   Exacerbating factors: certain type of movement.    Mitigating factors nothing.     Current pain medications: none   Failed Pain Medications: ibuprofen, tylenol    Pain procedures: neck epidurals in 2018    Initial hx:  Pierre Edmonds Jr. is a 47 y.o. male presents to the clinic for the evaluation of left shoulder pain. The pain started 6 + months ago following no inciting event and symptoms have been worsening.  He denies any trauma. He has geo doing PT for 2-3 months and got the ROM back in the shoulder, but the pain has persisted.  His pain is intermittent, and is aggravated by certain movements.  Shoulder extension seems to be what makes the pain better after a few seconds.  He gets it everyday.     Pain Description:    The pain is located in the left shoulder area and does not radiate.  At BEST  0/10   At WORST  9/10 on the WORST day.    On average pain is rated as 5/10.   Today the pain is rated as 1/10  The pain is intermittent.  The pain is described as burning and sharp    Symptoms interfere with daily activity and sleeping.    Exacerbating factors: certain type of movement.    Mitigating factors pushing the shoulder back.   He reports 7 hours of sleep per night.    Physical Therapy/Home Exercise: No, not currently in physical therapy or home exercise program    Current Pain Medications:    - none    Failed Pain Medications:    - tylenol, ibuprofen    Pain Treatment Therapies:     Physical Therapy: completed 2-3 months ago  Chiropractor: none  Acupuncture: none  TENS unit: none  Spinal decompression: none  Joint replacement: none    Patient denies urinary incontinence, bowel incontinence, significant motor weakness and loss of sensations.  Patient denies any suicidal or homicidal ideations     report:  Reviewed and consistent with medication use as prescribed.    Imaging:   MRI cervical 04/2015:  Technique: Shadow T1, T2 and STIR as well as axial T1 and T2 weighted images were obtained through the cervical spine without contrast.     Findings: There mild Modic degenerative endplate changes at C6-C7 with loss of disk height at this level.  The vertebral bodies otherwise maintain normal height, signal intensity and alignment.  The signal intensity in the cervical spinal cord is normal.    The craniocervical junction shows no abnormalities.  Localizer images show no abnormalities.        C2-C3: No significant disk or joint pathology     C3-C4: Osteophyte formation on the left causes mild left neuroforaminal stenosis.  The right neural foramen and central canal are patent     C4-C5: No significant disk or joint pathology.     C5-C6: Diffuse disk bulge with right disk osteophyte complex causes effacement of the anterior thecal sleeve and mild to moderate right neuroforaminal stenosis.  Mild left neural foraminal stenosis is also present.     C6-C7: Diffuse disk bulge with small osteophytes results in moderate bilateral neural foraminal stenosis and mild central canal stenosis.     C7-T1: No significant disk or joint pathology.  IMPRESSION:  "   Multilevel degenerative disk and joint disease which is most pronounced at C5-C6 and C6-C7.    MRI left shoulder 07/26/22:    Impression:    Small partial-thickness rotator cuff tear with tendinosis, as above.     Small labral tears, as above.     Moderate AC joint arthropathy.     Small amount of subdeltoid fluid/ bursitis.    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   Tobacco Use    Smoking status: Current Every Day Smoker     Packs/day: 1.00    Smokeless tobacco: Never Used   Substance and Sexual Activity    Alcohol use: No    Drug use: Yes     Types: Marijuana     Comment: everyday    Sexual activity: Yes     Partners: Female     Family History   Problem Relation Age of Onset    Diabetes Mother     Hypertension Father     Diabetes Maternal Aunt     Diabetes Maternal Grandmother     Melanoma Neg Hx        Review of patient's allergies indicates:   Allergen Reactions    Ceclor [cefaclor] Hives       Current Outpatient Medications   Medication Sig    albuterol (PROVENTIL HFA) 90 mcg/actuation inhaler Inhale 2 puffs into the lungs every 6 (six) hours as needed for Wheezing. Rescue    atorvastatin (LIPITOR) 20 MG tablet Take 1 tablet (20 mg total) by mouth once daily.    BD ARMANDO 2ND GEN PEN NEEDLE 32 gauge x 5/32" Ndle USE FOR INSULIN INJECTION DAILY    blood sugar diagnostic Strp To check BG 1 times daily, to use with insurance preferred meter    blood-glucose meter kit To check BG 1 times daily, to use with insurance preferred meter    lamoTRIgine (LAMICTAL) 200 MG tablet Take 200 mg by mouth 2 (two) times daily.    " "lancets Misc To check BG 1 times daily, to use with insurance preferred meter    LATUDA 60 mg Tab tablet SMARTSI Tablet(s) By Mouth Every Evening    LEVEMIR FLEXTOUCH U-100 INSULN 100 unit/mL (3 mL) InPn pen INJECT 18 UNITS INTO THE SKIN EVERY EVENING.    nicotine (NICODERM CQ) 21 mg/24 hr Place 1 patch on dry clean skin in morning    nicotine, polacrilex, (NICORETTE) 2 mg Gum Take 1 each (2 mg total) by mouth as needed (in place of cigarettes 8-10 times aday). Cinnamon flavor    sildenafiL (VIAGRA) 100 MG tablet Take 1 tablet (100 mg total) by mouth daily as needed for Erectile Dysfunction.    polyethylene glycol (GOLYTELY) 236-22.74-6.74 -5.86 gram suspension Take 4,000 mLs (4 L total) by mouth once. for 1 dose     No current facility-administered medications for this visit.       REVIEW OF SYSTEMS:    GENERAL:  No weight loss, malaise or fevers.   HEENT:   No recent changes in vision or hearing  NECK:  Negative for lumps, no difficulty with swallowing.  RESPIRATORY:  Negative for cough, wheezing or shortness of breath, patient denies any recent URI.  CARDIOVASCULAR:  Negative for chest pain, leg swelling or palpitations.  GI:  Negative for abdominal discomfort, blood in stools or black stools or change in bowel habits.  MUSCULOSKELETAL:  See HPI.  SKIN:  Negative for lesions, rash, and itching.  PSYCH:  No mood disorder or recent psychosocial stressors.  Patients sleep is not disturbed secondary to pain.  HEMATOLOGY/LYMPHOLOGY:  Negative for prolonged bleeding, bruising easily or swollen nodes.  Patient is not currently taking any anti-coagulants  NEURO:   No history of headaches, syncope, paralysis, seizures or tremors.  All other reviewed and negative other than HPI.    OBJECTIVE:    Resp 18   Ht 5' 9" (1.753 m)   Wt 72.1 kg (159 lb)   BMI 23.48 kg/m²     PHYSICAL EXAMINATION:    GENERAL: Well appearing, in no acute distress, alert and oriented x3.  PSYCH:  Mood and affect appropriate.  SKIN: Skin " color, texture, turgor normal, no rashes or lesions.  HEAD/FACE:  Normocephalic, atraumatic. Cranial nerves grossly intact.    NECK:   - Negative pain to palpation over the cervical paraspinous muscles.   - Spurling  Negative.  - Negative pain with neck flexion, extension, or lateral flexion.     CV: RRR with palpation of the radial artery.  PULM: CTAB. No evidence of respiratory difficulty, symmetric chest rise.  GI:  Soft and non-tender.    MUSKULOSKELETAL:    EXTREMITIES:   left Shoulder Exam:    Negative Edema around the shoulder  Negative Erythema around the shoulder  Negative deformity of the shoulder  Negative Scapular winging  Negative atrophy  Positive Tenderness to Palpation at the lateral deltoid.  Range of Motion is restricted  (+) empty can  (+) lift off test    Test Maneuver Positive/Negative Diagnosis suggested   Apley scratch test   positive Loss of range of motion: rotator cuff problem   Neer's Sign  negative Subacromial impingement   Hawkin's Test  negative Supraspinatus tendon impingement   Drop Arm Test Passively abducting the patient's shoulder, then observing as the patient slowly lowers the arm to the waist negative Rotator cuff tear   Cross Arm test  negative Acromioclavicular joint arthritis   Apprehension Test  not done Anterior glenohumeral instability   Yergason Test  not done Biceps tendon instability or tendonitis   Speed's  not done Biceps tendon instability or tendonitis   Sulcus Sign  negative Inferior glenohumeral instability     MUSCULOSKELETAL:  No atrophy or tone abnormalities are noted in the UE or LE.  No deformities, edema, or skin discoloration are noted on visible skin. Good capillary refill.    NEURO: Bilateral upper and lower extremity coordination and muscle stretch reflexes are physiologic and symmetric.      NEUROLOGICAL EXAM:  MENTAL STATUS: A x O x 3, good concentration, speech is fluent and goal directed  MEMORY: recent and remote are intact  CN: CN2-12 grossly  intact  MOTOR: 5/5 in all muscle groups  DTRs: 2+ intact symmetric  Sensation:    -no Loss of sensation in a left upper and right upper C-4, C-5, C-6, C-7 and C-8 bilaterally distribution.    Martini: absent on the bilateral side(s)  Clonus: absent on the bilateral side(s)    GAIT: normal.

## 2022-08-15 ENCOUNTER — CLINICAL SUPPORT (OUTPATIENT)
Dept: PAIN MEDICINE | Facility: CLINIC | Age: 47
End: 2022-08-15
Payer: COMMERCIAL

## 2022-08-15 DIAGNOSIS — M75.52 SUBACROMIAL BURSITIS OF LEFT SHOULDER JOINT: Primary | ICD-10-CM

## 2022-08-15 PROCEDURE — 99499 UNLISTED E&M SERVICE: CPT | Mod: S$GLB,,, | Performed by: STUDENT IN AN ORGANIZED HEALTH CARE EDUCATION/TRAINING PROGRAM

## 2022-08-15 PROCEDURE — 20611 PR DRAIN/ASP/INJECT MAJOR JOINT/BURSA W/US GUIDANCE: ICD-10-PCS | Mod: LT,S$GLB,, | Performed by: STUDENT IN AN ORGANIZED HEALTH CARE EDUCATION/TRAINING PROGRAM

## 2022-08-15 PROCEDURE — 99999 PR PBB SHADOW E&M-EST. PATIENT-LVL I: CPT | Mod: PBBFAC,,, | Performed by: STUDENT IN AN ORGANIZED HEALTH CARE EDUCATION/TRAINING PROGRAM

## 2022-08-15 PROCEDURE — 20611 DRAIN/INJ JOINT/BURSA W/US: CPT | Mod: LT,S$GLB,, | Performed by: STUDENT IN AN ORGANIZED HEALTH CARE EDUCATION/TRAINING PROGRAM

## 2022-08-15 PROCEDURE — 99499 NO LOS: ICD-10-PCS | Mod: S$GLB,,, | Performed by: STUDENT IN AN ORGANIZED HEALTH CARE EDUCATION/TRAINING PROGRAM

## 2022-08-15 PROCEDURE — 99999 PR PBB SHADOW E&M-EST. PATIENT-LVL I: ICD-10-PCS | Mod: PBBFAC,,, | Performed by: STUDENT IN AN ORGANIZED HEALTH CARE EDUCATION/TRAINING PROGRAM

## 2022-08-15 RX ORDER — TRIAMCINOLONE ACETONIDE 40 MG/ML
40 INJECTION, SUSPENSION INTRA-ARTICULAR; INTRAMUSCULAR
Status: COMPLETED | OUTPATIENT
Start: 2022-08-15 | End: 2022-08-15

## 2022-08-15 RX ADMIN — TRIAMCINOLONE ACETONIDE 40 MG: 40 INJECTION, SUSPENSION INTRA-ARTICULAR; INTRAMUSCULAR at 02:08

## 2022-08-15 NOTE — PROGRESS NOTES
"HPI  Patient presenting for left subacromial bursa injection     Patient on Anti-coagulation No    No health changes since previous encounter    Past Medical History:   Diagnosis Date    Atypical nevus of back excised 2019    severely atypical     Diabetes mellitus type II     Fever blister     Herniated lumbar intervertebral disc      Past Surgical History:   Procedure Laterality Date    APPENDECTOMY       Review of patient's allergies indicates:   Allergen Reactions    Ceclor [cefaclor] Hives      Current Outpatient Medications   Medication Sig    albuterol (PROVENTIL HFA) 90 mcg/actuation inhaler Inhale 2 puffs into the lungs every 6 (six) hours as needed for Wheezing. Rescue    atorvastatin (LIPITOR) 20 MG tablet Take 1 tablet (20 mg total) by mouth once daily.    BD ARMANDO 2ND GEN PEN NEEDLE 32 gauge x " Ndle USE FOR INSULIN INJECTION DAILY    blood sugar diagnostic Strp To check BG 1 times daily, to use with insurance preferred meter    blood-glucose meter kit To check BG 1 times daily, to use with insurance preferred meter    lamoTRIgine (LAMICTAL) 200 MG tablet Take 200 mg by mouth 2 (two) times daily.    lancets Misc To check BG 1 times daily, to use with insurance preferred meter    LATUDA 60 mg Tab tablet SMARTSI Tablet(s) By Mouth Every Evening    LEVEMIR FLEXTOUCH U-100 INSULN 100 unit/mL (3 mL) InPn pen INJECT 18 UNITS INTO THE SKIN EVERY EVENING.    nicotine (NICODERM CQ) 21 mg/24 hr Place 1 patch on dry clean skin in morning    nicotine, polacrilex, (NICORETTE) 2 mg Gum Take 1 each (2 mg total) by mouth as needed (in place of cigarettes 8-10 times aday). Cinnamon flavor    sildenafiL (VIAGRA) 100 MG tablet Take 1 tablet (100 mg total) by mouth daily as needed for Erectile Dysfunction.     No current facility-administered medications for this visit.       PMHx, PSHx, Allergies, Medications reviewed in epic    ROS negative except pain complaints in " HPI    OBJECTIVE:    There were no vitals taken for this visit.    PHYSICAL EXAMINATION:    GENERAL: Well appearing, in no acute distress, alert and oriented x3.  PSYCH:  Mood and affect appropriate.  SKIN: Skin color, texture, turgor normal, no rashes or lesions which will impact the procedure.  CV: RRR with palpation of the radial artery.  PULM: No evidence of respiratory difficulty, symmetric chest rise. Clear to auscultation.  NEURO: Cranial nerves grossly intact.    Plan:    Proceed with procedure as planned    Gee Ruiz  08/15/2022

## 2022-08-15 NOTE — PROCEDURES
"Procedures   PROCEDURE:  left Subacromial Bursa Injection With Ultrasound Guidance    PATIENT NAME: Pierre Edmonds Jr.   MRN: [id number]     DATE OF PROCEDURE: 08/15/2022    Diagnosis: Subacromial Bursitis  CPT code:  93110    Injection # 1 this year.    Postprocedural Diagnosis: Same  Needle Type: - 27G 1.5" needle    Solution injected: A 6ml mixture of 0.25% Bupivacaine and 40mg Kenalog    Estimated Blood Loss - <2ml  Drains: None  Specimens Removed: None  Urine Output - Not Measured  Complications: None  Outcome: Good    Informed Consent:  The patient's condition and proposed procedures, risks (including complications of nerve damage,  bleeding, infection, and failure of pain relief), and alternatives were discussed with the patient or responsible party.  The patient's/responsible party's questions were answered.  The patient/responsible party appeared to understand and chose to proceed.  Informed consent was obtained.    PROCEDURE IN DETAIL:   The procedure was performed in the procedure treatment room with ultrasound. The patient was placed in a supine position position with the arm resting comfortably by their side.    The target site of injection was identified using ultrasound.  The deltoid, humeral head, and supraspinatus tendon were identified on ultrasound.  The skin overlying the target site of injection was prepped and draped in an aseptic fashion.  The skin entry site was identified and marked.     Procedural Pause:  A procedural pause verifying correct patient, medical record number, allergies, medications to be administered, current vital signs, and surgical site was performed immediately prior to beginning the procedure.    The subacromial bursa and surrounding structures (structures identified: humeral head, supraspinatus tendon, deltoid) were identified using ultrasound guidance in a short-axis view at the anterior/lateral shoulder. The skin and subcutaneous tissue overlying the target site of " injection was anesthetized using 2 ml of 1% lidocaine MPF with a 27-gauge, 1½ -inch needle. The above noted block needle was inserted and advanced under live US guidance until the needle entered the bursal space (located between the deltoid and supraspinatus tendon.  There was no evidence of heme or paresthesia. Subsequently, the injectate mixture was slowly and incrementally injected without resistance and the bursal sac was noted to extend with administration of the Injectate.  The needle was then flushed and withdrawn. The needle was then retracted.    The patient tolerated the procedure well and without complications. After meeting discharge criteria, the patient was discharged home safely.    DISCUSSION:  A subacromial bursa injection was performed today to treat the patients myofascial pain. The purpose was to improve the patients function and decrease pain. We have reminded the patient that all injections must be done in conjunction with a stretching and exercise program.  Without a exercise program, results from these injections are often suboptimal. The patient was advised to relax and avoid any heavy lifting or excessive bending for 24 hours. He was advised that he may return to his usual activities after 24 hours if he is otherwise feeling well.  The patient was advised not to bathe or soak in water for 24 hours but that showering would be acceptable.      Note Electronically Signed By:  Gee Ruiz  08/15/2022

## 2022-08-19 ENCOUNTER — ANESTHESIA (OUTPATIENT)
Dept: ENDOSCOPY | Facility: HOSPITAL | Age: 47
End: 2022-08-19
Payer: COMMERCIAL

## 2022-08-19 ENCOUNTER — HOSPITAL ENCOUNTER (OUTPATIENT)
Facility: HOSPITAL | Age: 47
Discharge: HOME | End: 2022-08-19
Attending: INTERNAL MEDICINE | Admitting: INTERNAL MEDICINE
Payer: COMMERCIAL

## 2022-08-19 ENCOUNTER — ANESTHESIA EVENT (OUTPATIENT)
Dept: ENDOSCOPY | Facility: HOSPITAL | Age: 47
End: 2022-08-19
Payer: COMMERCIAL

## 2022-08-19 VITALS
HEART RATE: 61 BPM | WEIGHT: 165 LBS | OXYGEN SATURATION: 100 % | TEMPERATURE: 98 F | SYSTOLIC BLOOD PRESSURE: 109 MMHG | HEIGHT: 69 IN | RESPIRATION RATE: 15 BRPM | DIASTOLIC BLOOD PRESSURE: 77 MMHG | BODY MASS INDEX: 24.44 KG/M2

## 2022-08-19 DIAGNOSIS — Z12.11 SCREEN FOR COLON CANCER: Primary | ICD-10-CM

## 2022-08-19 PROCEDURE — 63600175 PHARM REV CODE 636 W HCPCS: Performed by: NURSE ANESTHETIST, CERTIFIED REGISTERED

## 2022-08-19 PROCEDURE — 37000009 HC ANESTHESIA EA ADD 15 MINS: Performed by: INTERNAL MEDICINE

## 2022-08-19 PROCEDURE — 45385 PR COLONOSCOPY,REMV LESN,SNARE: ICD-10-PCS | Mod: 33,,, | Performed by: INTERNAL MEDICINE

## 2022-08-19 PROCEDURE — 45385 COLONOSCOPY W/LESION REMOVAL: CPT | Mod: PT | Performed by: INTERNAL MEDICINE

## 2022-08-19 PROCEDURE — 45380 COLONOSCOPY AND BIOPSY: CPT | Mod: 33,59,, | Performed by: INTERNAL MEDICINE

## 2022-08-19 PROCEDURE — E9220 PRA ENDO ANESTHESIA: ICD-10-PCS | Mod: 33,,, | Performed by: NURSE ANESTHETIST, CERTIFIED REGISTERED

## 2022-08-19 PROCEDURE — 45380 COLONOSCOPY AND BIOPSY: CPT | Mod: PT,59 | Performed by: INTERNAL MEDICINE

## 2022-08-19 PROCEDURE — 37000008 HC ANESTHESIA 1ST 15 MINUTES: Performed by: INTERNAL MEDICINE

## 2022-08-19 PROCEDURE — 25000003 PHARM REV CODE 250: Performed by: INTERNAL MEDICINE

## 2022-08-19 PROCEDURE — 88305 TISSUE EXAM BY PATHOLOGIST: ICD-10-PCS | Mod: 26,,, | Performed by: PATHOLOGY

## 2022-08-19 PROCEDURE — E9220 PRA ENDO ANESTHESIA: HCPCS | Mod: 33,,, | Performed by: NURSE ANESTHETIST, CERTIFIED REGISTERED

## 2022-08-19 PROCEDURE — 27201089 HC SNARE, DISP (ANY): Performed by: INTERNAL MEDICINE

## 2022-08-19 PROCEDURE — 88305 TISSUE EXAM BY PATHOLOGIST: CPT | Mod: 26,,, | Performed by: PATHOLOGY

## 2022-08-19 PROCEDURE — 45380 PR COLONOSCOPY,BIOPSY: ICD-10-PCS | Mod: 33,59,, | Performed by: INTERNAL MEDICINE

## 2022-08-19 PROCEDURE — 27201012 HC FORCEPS, HOT/COLD, DISP: Performed by: INTERNAL MEDICINE

## 2022-08-19 PROCEDURE — 88305 TISSUE EXAM BY PATHOLOGIST: CPT | Performed by: PATHOLOGY

## 2022-08-19 PROCEDURE — 45385 COLONOSCOPY W/LESION REMOVAL: CPT | Mod: 33,,, | Performed by: INTERNAL MEDICINE

## 2022-08-19 RX ORDER — SODIUM CHLORIDE 9 MG/ML
INJECTION, SOLUTION INTRAVENOUS CONTINUOUS
Status: DISCONTINUED | OUTPATIENT
Start: 2022-08-19 | End: 2022-08-19 | Stop reason: HOSPADM

## 2022-08-19 RX ORDER — PROPOFOL 10 MG/ML
INJECTION, EMULSION INTRAVENOUS CONTINUOUS PRN
Status: DISCONTINUED | OUTPATIENT
Start: 2022-08-19 | End: 2022-08-19

## 2022-08-19 RX ORDER — LIDOCAINE HCL/PF 100 MG/5ML
SYRINGE (ML) INTRAVENOUS
Status: DISCONTINUED | OUTPATIENT
Start: 2022-08-19 | End: 2022-08-19

## 2022-08-19 RX ORDER — SODIUM CHLORIDE 0.9 % (FLUSH) 0.9 %
10 SYRINGE (ML) INJECTION
Status: DISCONTINUED | OUTPATIENT
Start: 2022-08-19 | End: 2022-08-19 | Stop reason: HOSPADM

## 2022-08-19 RX ORDER — PROPOFOL 10 MG/ML
INJECTION, EMULSION INTRAVENOUS
Status: DISCONTINUED | OUTPATIENT
Start: 2022-08-19 | End: 2022-08-19

## 2022-08-19 RX ADMIN — PROPOFOL 10 MG: 10 INJECTION, EMULSION INTRAVENOUS at 12:08

## 2022-08-19 RX ADMIN — PROPOFOL 150 MCG/KG/MIN: 10 INJECTION, EMULSION INTRAVENOUS at 12:08

## 2022-08-19 RX ADMIN — SODIUM CHLORIDE: 9 INJECTION, SOLUTION INTRAVENOUS at 11:08

## 2022-08-19 RX ADMIN — PROPOFOL 30 MG: 10 INJECTION, EMULSION INTRAVENOUS at 12:08

## 2022-08-19 RX ADMIN — Medication 100 MG: at 12:08

## 2022-08-19 RX ADMIN — PROPOFOL 110 MG: 10 INJECTION, EMULSION INTRAVENOUS at 12:08

## 2022-08-19 NOTE — H&P
"    Short Stay Endoscopy History and Physical    PCP - Jose Sanchez Ii, MD    Procedure - Colonoscopy  ASA - per anesthesia  Mallampati - per anesthesia  History of Anesthesia problems - no  Family history Anesthesia problems - no   Plan of anesthesia - General    HPI:  This is a 47 y.o. male here for evaluation of screening colonoscopy       Medical History:  has a past medical history of Atypical nevus of back (excised 2019), Diabetes mellitus type II, Fever blister, and Herniated lumbar intervertebral disc ().    Surgical History:  has a past surgical history that includes Appendectomy.    Family History: family history includes Diabetes in his maternal aunt, maternal grandmother, and mother; Hypertension in his father.. Otherwise no colon cancer, inflammatory bowel disease, or GI malignancies.    Social History:  reports that he has been smoking. He has been smoking about 1.00 pack per day. He has never used smokeless tobacco. He reports current drug use. Drug: Marijuana. He reports that he does not drink alcohol.    Review of patient's allergies indicates:   Allergen Reactions    Ceclor [cefaclor] Hives       Medications:   Medications Prior to Admission   Medication Sig Dispense Refill Last Dose    albuterol (PROVENTIL HFA) 90 mcg/actuation inhaler Inhale 2 puffs into the lungs every 6 (six) hours as needed for Wheezing. Rescue 18 g 0 Past Month at Unknown time    atorvastatin (LIPITOR) 20 MG tablet Take 1 tablet (20 mg total) by mouth once daily. 90 tablet 3 2022 at Unknown time    lamoTRIgine (LAMICTAL) 200 MG tablet Take 200 mg by mouth 2 (two) times daily.   2022 at Unknown time    LATUDA 60 mg Tab tablet SMARTSI Tablet(s) By Mouth Every Evening   2022 at Unknown time    BD ARMANDO 2ND GEN PEN NEEDLE 32 gauge x " Ndle USE FOR INSULIN INJECTION DAILY 200 each 3 Unknown at Unknown time    blood sugar diagnostic Strp To check BG 1 times daily, to use with insurance preferred " meter 200 strip 3 Unknown at Unknown time    blood-glucose meter kit To check BG 1 times daily, to use with insurance preferred meter 1 each 0 Unknown at Unknown time    lancets Misc To check BG 1 times daily, to use with insurance preferred meter 200 each 3 Unknown at Unknown time    LEVEMIR FLEXTOUCH U-100 INSULN 100 unit/mL (3 mL) InPn pen INJECT 18 UNITS INTO THE SKIN EVERY EVENING. 18 mL 3 8/17/2022    nicotine (NICODERM CQ) 21 mg/24 hr Place 1 patch on dry clean skin in morning 28 patch 0 Unknown at Unknown time    nicotine, polacrilex, (NICORETTE) 2 mg Gum Take 1 each (2 mg total) by mouth as needed (in place of cigarettes 8-10 times aday). Cinnamon flavor 200 each 0 Unknown at Unknown time    sildenafiL (VIAGRA) 100 MG tablet Take 1 tablet (100 mg total) by mouth daily as needed for Erectile Dysfunction. 5 tablet 6 Unknown at Unknown time         Physical Exam:    Vital Signs:   Vitals:    08/19/22 1130   BP: 114/69   Pulse: (!) 59   Resp: 16   Temp: 97.7 °F (36.5 °C)       I have explained the risks and benefits of endoscopy procedures to the patient including but not limited to bleeding, perforation, infection, missed lesions.      Vijaya Diane MD

## 2022-08-19 NOTE — [employer] PATIENT INSTRUCTIONS
Discharge Summary/Instructions after an Endoscopic Procedure  Patient Name: Pierre Edmonds  Patient MRN: [id number]  Patient YOB: 1975 Friday, August 19, 2022  Vijaya Diane MD  Dear patient,  As a result of recent federal legislation (The Federal Cures Act), you may   receive lab or pathology results from your procedure in your [facility]   account before your physician is able to contact you. Your physician or   their representative will relay the results to you with their   recommendations at their soonest availability.  Thank you,  RESTRICTIONS:  During your procedure today, you received medications for sedation.  These   medications may affect your judgment, balance and coordination.  Therefore,   for 24 hours, you have the following restrictions:   - DO NOT drive a car, operate machinery, make legal/financial decisions,   sign important papers or drink alcohol.    ACTIVITY:  Today: no heavy lifting, straining or running due to procedural   sedation/anesthesia.  The following day: return to full activity including work.  DIET:  Eat and drink normally unless instructed otherwise.     TREATMENT FOR COMMON SIDE EFFECTS:  - Mild abdominal pain, nausea, belching, bloating or excessive gas:  rest,   eat lightly and use a heating pad.  - Sore Throat: treat with throat lozenges and/or gargle with warm salt   water.  - Because air was used during the procedure, expelling large amounts of air   from your rectum or belching is normal.  - If a bowel prep was taken, you may not have a bowel movement for 1-3 days.    This is normal.  SYMPTOMS TO WATCH FOR AND REPORT TO YOUR PHYSICIAN:  1. Abdominal pain or bloating, other than gas cramps.  2. Chest pain.  3. Back pain.  4. Signs of infection such as: chills or fever occurring within 24 hours   after the procedure.  5. Rectal bleeding, which would show as bright red, maroon, or black stools.   (A tablespoon of blood from the rectum is not serious, especially if    hemorrhoids are present.)  6. Vomiting.  7. Weakness or dizziness.  GO DIRECTLY TO THE NEAREST EMERGENCY ROOM IF YOU HAVE ANY OF THE FOLLOWING:      Difficulty breathing              Chills and/or fever over 101 F   Persistent vomiting and/or vomiting blood   Severe abdominal pain   Severe chest pain   Black, tarry stools   Bleeding- more than one tablespoon   Any other symptom or condition that you feel may need urgent attention  Your doctor recommends these additional instructions:  If any biopsies were taken, your doctors clinic will contact you in 1 to 2   weeks with any results.  - Discharge patient to home (with escort).   - Resume previous diet.   - Continue present medications.   - Await pathology results.   - Repeat colonoscopy in 3 years for surveillance.   - Return to referring physician as previously scheduled.   - The findings and recommendations were discussed with the patient.   - Patient has a contact number available for emergencies.  The signs and   symptoms of potential delayed complications were discussed with the   patient.  Return to normal activities tomorrow.  Written discharge   instructions were provided to the patient.  For questions, problems or results please call your physician - Vijaya Diane MD at Work:  [phone].  [facility] [place], EMERGENCY ROOM PHONE NUMBER: [phone]  IF A COMPLICATION OR EMERGENCY SITUATION ARISES AND YOU ARE UNABLE TO REACH   YOUR PHYSICIAN - GO DIRECTLY TO THE EMERGENCY ROOM.  Viajya Diane MD  8/19/2022 1:05:13 PM  This report has been verified and signed electronically.  Dear patient,  As a result of recent federal legislation (The Federal Cures Act), you may   receive lab or pathology results from your procedure in your [facility]   account before your physician is able to contact you. Your physician or   their representative will relay the results to you with their   recommendations at their soonest availability.  Thank you,  [employer]

## 2022-08-19 NOTE — ANESTHESIA PREPROCEDURE EVALUATION
08/19/2022  Pierre Edmonds Jr. is a 47 y.o., male.  Patient Active Problem List   Diagnosis    Smoker    Controlled type 2 diabetes mellitus without complication, with long-term current use of insulin    Cervical radiculopathy    Lumbar disc herniation with radiculopathy    Major depressive disorder, recurrent episode, mild    Muscle spasms of head and/or neck    Pulmonary nodules    Bipolar disorder, in partial remission, most recent episode mixed     Past Medical History:   Diagnosis Date    Atypical nevus of back excised 8/2019    severely atypical     Diabetes mellitus type II     Fever blister     Herniated lumbar intervertebral disc 2011     Pre-op Assessment    I have reviewed the NPO Status.      Review of Systems  Social:  Smoker THC   Musculoskeletal:  Spine Disorders: cervical and lumbar    Endocrine:   Diabetes    Psych:   Psychiatric History depression          Physical Exam    Airway:  Mallampati: II           Anesthesia Plan  Type of Anesthesia, risks & benefits discussed:    Anesthesia Type: Gen Natural Airway  Intra-op Monitoring Plan: Standard ASA Monitors  Induction:  IV  Informed Consent: Informed consent signed with the Patient and all parties understand the risks and agree with anesthesia plan.  All questions answered.   ASA Score: 2    Ready For Surgery From Anesthesia Perspective.     .

## 2022-08-19 NOTE — TRANSFER OF CARE
Anesthesia Transfer of Care Note    Patient: Pierre Edmonds JrEmmanuel    Procedure(s) Performed: Procedure(s) (LRB):  COLONOSCOPY (N/A)    Patient location: GI    Anesthesia Type: general    Transport from OR: Transported from OR on room air with adequate spontaneous ventilation    Post pain: adequate analgesia    Post assessment: no apparent anesthetic complications    Post vital signs: stable    Level of consciousness: sedated    Nausea/Vomiting: no nausea/vomiting    Complications: none    Transfer of care protocol was followed      Last vitals:   Visit Vitals  /56   Pulse 58   Temp 36   Resp 20   SpO2 95%

## 2022-08-19 NOTE — ANESTHESIA POSTPROCEDURE EVALUATION
Anesthesia Post Evaluation    Patient: Pierre Edmonds Jr.    Procedure(s) Performed: Procedure(s) (LRB):  COLONOSCOPY (N/A)    Final Anesthesia Type: general      Patient location during evaluation: PACU  Patient participation: Yes- Able to Participate  Level of consciousness: awake and alert and oriented  Pain management: adequate  Airway patency: patent    PONV status at discharge: No PONV  Anesthetic complications: no      Cardiovascular status: blood pressure returned to baseline and hemodynamically stable  Respiratory status: unassisted  Hydration status: euvolemic  Follow-up not needed.          Vitals Value Taken Time   /59 08/19/22 1306   Temp 36.5 °C (97.7 °F) 08/19/22 1306   Pulse 55 08/19/22 1306   Resp 17 08/19/22 1306   SpO2 96 % 08/19/22 1306         No case tracking events are documented in the log.      Pain/Barbara Score: Barbara Score: 9 (8/19/2022  1:06 PM)         Cally

## 2022-08-22 LAB — POCT GLUCOSE: 112 MG/DL (ref 70–110)

## 2022-08-24 LAB
FINAL PATHOLOGIC DIAGNOSIS: NORMAL
GROSS: NORMAL
Lab: NORMAL

## 2022-09-12 ENCOUNTER — OFFICE VISIT (OUTPATIENT)
Dept: PAIN MEDICINE | Facility: CLINIC | Age: 47
End: 2022-09-12
Payer: COMMERCIAL

## 2022-09-12 VITALS
RESPIRATION RATE: 18 BRPM | SYSTOLIC BLOOD PRESSURE: 102 MMHG | BODY MASS INDEX: 24.44 KG/M2 | HEART RATE: 59 BPM | DIASTOLIC BLOOD PRESSURE: 67 MMHG | HEIGHT: 69 IN | WEIGHT: 165 LBS

## 2022-09-12 DIAGNOSIS — M75.52 SUBACROMIAL BURSITIS OF LEFT SHOULDER JOINT: Primary | ICD-10-CM

## 2022-09-12 DIAGNOSIS — M67.814 TENDINOSIS OF LEFT SHOULDER: ICD-10-CM

## 2022-09-12 DIAGNOSIS — S46.012A STRAIN OF TENDON OF LEFT ROTATOR CUFF, INITIAL ENCOUNTER: ICD-10-CM

## 2022-09-12 PROCEDURE — 99214 OFFICE O/P EST MOD 30 MIN: CPT | Mod: S$GLB,,, | Performed by: STUDENT IN AN ORGANIZED HEALTH CARE EDUCATION/TRAINING PROGRAM

## 2022-09-12 PROCEDURE — 3074F PR MOST RECENT SYSTOLIC BLOOD PRESSURE < 130 MM HG: ICD-10-PCS | Mod: CPTII,S$GLB,, | Performed by: STUDENT IN AN ORGANIZED HEALTH CARE EDUCATION/TRAINING PROGRAM

## 2022-09-12 PROCEDURE — 3078F DIAST BP <80 MM HG: CPT | Mod: CPTII,S$GLB,, | Performed by: STUDENT IN AN ORGANIZED HEALTH CARE EDUCATION/TRAINING PROGRAM

## 2022-09-12 PROCEDURE — 3066F NEPHROPATHY DOC TX: CPT | Mod: CPTII,S$GLB,, | Performed by: STUDENT IN AN ORGANIZED HEALTH CARE EDUCATION/TRAINING PROGRAM

## 2022-09-12 PROCEDURE — 1159F PR MEDICATION LIST DOCUMENTED IN MEDICAL RECORD: ICD-10-PCS | Mod: CPTII,S$GLB,, | Performed by: STUDENT IN AN ORGANIZED HEALTH CARE EDUCATION/TRAINING PROGRAM

## 2022-09-12 PROCEDURE — 3066F PR DOCUMENTATION OF TREATMENT FOR NEPHROPATHY: ICD-10-PCS | Mod: CPTII,S$GLB,, | Performed by: STUDENT IN AN ORGANIZED HEALTH CARE EDUCATION/TRAINING PROGRAM

## 2022-09-12 PROCEDURE — 3044F PR MOST RECENT HEMOGLOBIN A1C LEVEL <7.0%: ICD-10-PCS | Mod: CPTII,S$GLB,, | Performed by: STUDENT IN AN ORGANIZED HEALTH CARE EDUCATION/TRAINING PROGRAM

## 2022-09-12 PROCEDURE — 3044F HG A1C LEVEL LT 7.0%: CPT | Mod: CPTII,S$GLB,, | Performed by: STUDENT IN AN ORGANIZED HEALTH CARE EDUCATION/TRAINING PROGRAM

## 2022-09-12 PROCEDURE — 3061F NEG MICROALBUMINURIA REV: CPT | Mod: CPTII,S$GLB,, | Performed by: STUDENT IN AN ORGANIZED HEALTH CARE EDUCATION/TRAINING PROGRAM

## 2022-09-12 PROCEDURE — 1160F RVW MEDS BY RX/DR IN RCRD: CPT | Mod: CPTII,S$GLB,, | Performed by: STUDENT IN AN ORGANIZED HEALTH CARE EDUCATION/TRAINING PROGRAM

## 2022-09-12 PROCEDURE — 3078F PR MOST RECENT DIASTOLIC BLOOD PRESSURE < 80 MM HG: ICD-10-PCS | Mod: CPTII,S$GLB,, | Performed by: STUDENT IN AN ORGANIZED HEALTH CARE EDUCATION/TRAINING PROGRAM

## 2022-09-12 PROCEDURE — 99214 PR OFFICE/OUTPT VISIT, EST, LEVL IV, 30-39 MIN: ICD-10-PCS | Mod: S$GLB,,, | Performed by: STUDENT IN AN ORGANIZED HEALTH CARE EDUCATION/TRAINING PROGRAM

## 2022-09-12 PROCEDURE — 3061F PR NEG MICROALBUMINURIA RESULT DOCUMENTED/REVIEW: ICD-10-PCS | Mod: CPTII,S$GLB,, | Performed by: STUDENT IN AN ORGANIZED HEALTH CARE EDUCATION/TRAINING PROGRAM

## 2022-09-12 PROCEDURE — 99999 PR PBB SHADOW E&M-EST. PATIENT-LVL IV: ICD-10-PCS | Mod: PBBFAC,,, | Performed by: STUDENT IN AN ORGANIZED HEALTH CARE EDUCATION/TRAINING PROGRAM

## 2022-09-12 PROCEDURE — 3074F SYST BP LT 130 MM HG: CPT | Mod: CPTII,S$GLB,, | Performed by: STUDENT IN AN ORGANIZED HEALTH CARE EDUCATION/TRAINING PROGRAM

## 2022-09-12 PROCEDURE — 3008F PR BODY MASS INDEX (BMI) DOCUMENTED: ICD-10-PCS | Mod: CPTII,S$GLB,, | Performed by: STUDENT IN AN ORGANIZED HEALTH CARE EDUCATION/TRAINING PROGRAM

## 2022-09-12 PROCEDURE — 1160F PR REVIEW ALL MEDS BY PRESCRIBER/CLIN PHARMACIST DOCUMENTED: ICD-10-PCS | Mod: CPTII,S$GLB,, | Performed by: STUDENT IN AN ORGANIZED HEALTH CARE EDUCATION/TRAINING PROGRAM

## 2022-09-12 PROCEDURE — 3008F BODY MASS INDEX DOCD: CPT | Mod: CPTII,S$GLB,, | Performed by: STUDENT IN AN ORGANIZED HEALTH CARE EDUCATION/TRAINING PROGRAM

## 2022-09-12 PROCEDURE — 99999 PR PBB SHADOW E&M-EST. PATIENT-LVL IV: CPT | Mod: PBBFAC,,, | Performed by: STUDENT IN AN ORGANIZED HEALTH CARE EDUCATION/TRAINING PROGRAM

## 2022-09-12 PROCEDURE — 1159F MED LIST DOCD IN RCRD: CPT | Mod: CPTII,S$GLB,, | Performed by: STUDENT IN AN ORGANIZED HEALTH CARE EDUCATION/TRAINING PROGRAM

## 2022-09-12 NOTE — PROGRESS NOTES
Chronic Pain - f/u    Referring Physician: No ref. provider found    Date: 09/12/2022     Re: Pierre Edmonds Jr.  MR#: [id number]  YOB: 1975  Age: 47 y.o.    Chief Complaint: shoulder pain  No chief complaint on file.    **This note is dictated using the M*Modal Fluency Direct word recognition program. There are word recognition mistakes that are occasionally missed on review.**    ASSESSMENT: 47 y.o. year old male with shoulder pain, consistent with     1. Subacromial bursitis of left shoulder joint        2. Tendinosis of left shoulder        3. Strain of tendon of left rotator cuff, initial encounter                PLAN:     Left shoulder pain / rotator cuff tendinosis / bursitis  -MRI and XR left shoulder - small partial thickness tear of supraspinatus tendon, insfraspinatus tendinosis, subscao tendinosis. Subacromial bursitis. Moderate AC joint arthropathy. Small labral tears.  -failed PT, oral meds  -s/p left subacromial bursa injection on 8/15/22 - 90% improvement at 1 month    Tobacco use  -if MRI shows something that needs to be repaired surgically, then the patient will have to stop smoking to increase success chance    - RTC PRN  - Counseled patient regarding the importance of activity modification.    The above plan and management options were discussed at length with patient. Patient is in agreement with the above and verbalized understanding. It will be communicated with the referring physician via electronic record, fax, or mail.  Lab/study reports reviewed were important and necessary because subsequent medical and treatment recommendations required review of the above lab/study reports. Images viewed/reviewed above were important and necessary because subsequent medical and treatment recommendations required review of the reviewed image(s).     Electronically signed by:  Gee Carlisle  DO  09/12/2022    =========================================================================================================    SUBJECTIVE:    Interval History 9/12/2022:     Pierre Edmonds Jr. is a 47 y.o. male presents to the clinic for follow up.  Since last visit the pain has has significantly improved. He got a subacroimial bursa injection on 8/15/22 and is having 90% improvement in his pain since the injection.  He does not require any pain medications.    The pain is located in the left shoulder area and does not radiate.  The pain is described as burning    At BEST  0/10   At WORST  2/10 on the WORST day.    On average pain is rated as 2/10.   Today the pain is rated as 0/10  Symptoms interfere with daily activity.   Exacerbating factors: randomly comes.    Mitigating factors heat.     Current pain medications: none   Failed Pain Medications: ibuprofen, tylenol    Pain procedures: neck epidurals in 2018  8/15/22 - s/p L subacromial bursa inj w/US    Interval History 8/8/2022:     Pierre Edmonds Jr. is a 47 y.o. male presents to the clinic for follow up.  Since last visit the pain has is unchanged.    Had MRI left shoulder:    Impression:   Small partial-thickness rotator cuff tear with tendinosis, as above.  Small labral tears, as above.   Moderate AC joint arthropathy.   Small amount of subdeltoid fluid/ bursitis.    The pain is located in the left shoulder area and radiates to the upper arm .  The pain is described as burning and sharp    At BEST  0/10   At WORST  9/10 on the WORST day.    On average pain is rated as 5/10.   Today the pain is rated as 0/10  Symptoms interfere with daily activity and sleeping.   Exacerbating factors: certain type of movement.    Mitigating factors nothing.     Initial hx:  Pierre Edmonds Jr. is a 47 y.o. male presents to the clinic for the evaluation of left shoulder pain. The pain started 6 + months ago following no inciting event and symptoms have been worsening.  He  denies any trauma. He has geo doing PT for 2-3 months and got the ROM back in the shoulder, but the pain has persisted.  His pain is intermittent, and is aggravated by certain movements.  Shoulder extension seems to be what makes the pain better after a few seconds.  He gets it everyday.     Pain Description:    The pain is located in the left shoulder area and does not radiate.  At BEST  0/10   At WORST  9/10 on the WORST day.    On average pain is rated as 5/10.   Today the pain is rated as 1/10  The pain is intermittent.  The pain is described as burning and sharp    Symptoms interfere with daily activity and sleeping.   Exacerbating factors: certain type of movement.    Mitigating factors pushing the shoulder back.   He reports 7 hours of sleep per night.    Physical Therapy/Home Exercise: No, not currently in physical therapy or home exercise program    Current Pain Medications:    - none    Failed Pain Medications:    - tylenol, ibuprofen    Pain Treatment Therapies:     Physical Therapy: completed 2-3 months ago  Chiropractor: none  Acupuncture: none  TENS unit: none  Spinal decompression: none  Joint replacement: none    Patient denies urinary incontinence, bowel incontinence, significant motor weakness and loss of sensations.  Patient denies any suicidal or homicidal ideations     report:  Reviewed and consistent with medication use as prescribed.    Imaging:   MRI cervical 04/2015:  Technique: Shadow T1, T2 and STIR as well as axial T1 and T2 weighted images were obtained through the cervical spine without contrast.     Findings: There mild Modic degenerative endplate changes at C6-C7 with loss of disk height at this level.  The vertebral bodies otherwise maintain normal height, signal intensity and alignment.  The signal intensity in the cervical spinal cord is normal.    The craniocervical junction shows no abnormalities.  Localizer images show no abnormalities.        C2-C3: No significant disk or  joint pathology     C3-C4: Osteophyte formation on the left causes mild left neuroforaminal stenosis.  The right neural foramen and central canal are patent     C4-C5: No significant disk or joint pathology.     C5-C6: Diffuse disk bulge with right disk osteophyte complex causes effacement of the anterior thecal sleeve and mild to moderate right neuroforaminal stenosis.  Mild left neural foraminal stenosis is also present.     C6-C7: Diffuse disk bulge with small osteophytes results in moderate bilateral neural foraminal stenosis and mild central canal stenosis.     C7-T1: No significant disk or joint pathology.  IMPRESSION:    Multilevel degenerative disk and joint disease which is most pronounced at C5-C6 and C6-C7.    MRI left shoulder 07/26/22:    Impression:    Small partial-thickness rotator cuff tear with tendinosis, as above.     Small labral tears, as above.     Moderate AC joint arthropathy.     Small amount of subdeltoid fluid/ bursitis.    Past Medical History:   Diagnosis Date    Atypical nevus of back excised 8/2019    severely atypical     Diabetes mellitus type II     Fever blister     Herniated lumbar intervertebral disc 2011     Past Surgical History:   Procedure Laterality Date    APPENDECTOMY      COLONOSCOPY N/A 8/19/2022    Procedure: COLONOSCOPY;  Surgeon: Vijaya Diane MD;  Location: [address]);  Service: Endoscopy;  Laterality: N/A;  any md-vacc-inst portal-pm prep-clears 4 hrs prior-tb  8/16/22- Pt requesting to stay at current time due to ride , Pt ok with DR. 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   Tobacco Use    Smoking status: Every Day     Packs/day: 1.00     Types: Cigarettes    Smokeless tobacco: Never   Substance and Sexual Activity    Alcohol use: No    Drug use: Yes     Types: Marijuana     Comment: everyday    Sexual activity:  "Yes     Partners: Female     Family History   Problem Relation Age of Onset    Diabetes Mother     Hypertension Father     Diabetes Maternal Aunt     Diabetes Maternal Grandmother     Melanoma Neg Hx        Review of patient's allergies indicates:   Allergen Reactions    Ceclor [cefaclor] Hives       Current Outpatient Medications   Medication Sig    albuterol (PROVENTIL HFA) 90 mcg/actuation inhaler Inhale 2 puffs into the lungs every 6 (six) hours as needed for Wheezing. Rescue    atorvastatin (LIPITOR) 20 MG tablet Take 1 tablet (20 mg total) by mouth once daily.    BD ARMANDO 2ND GEN PEN NEEDLE 32 gauge x 5/32" Ndle USE FOR INSULIN INJECTION DAILY    blood sugar diagnostic Strp To check BG 1 times daily, to use with insurance preferred meter    blood-glucose meter kit To check BG 1 times daily, to use with insurance preferred meter    lamoTRIgine (LAMICTAL) 200 MG tablet Take 200 mg by mouth 2 (two) times daily.    lancets Misc To check BG 1 times daily, to use with insurance preferred meter    LATUDA 60 mg Tab tablet SMARTSI Tablet(s) By Mouth Every Evening    LEVEMIR FLEXTOUCH U-100 INSULN 100 unit/mL (3 mL) InPn pen INJECT 18 UNITS INTO THE SKIN EVERY EVENING.    nicotine (NICODERM CQ) 21 mg/24 hr Place 1 patch on dry clean skin in morning    nicotine, polacrilex, (NICORETTE) 2 mg Gum Take 1 each (2 mg total) by mouth as needed (in place of cigarettes 8-10 times aday). Cinnamon flavor    sildenafiL (VIAGRA) 100 MG tablet Take 1 tablet (100 mg total) by mouth daily as needed for Erectile Dysfunction.     No current facility-administered medications for this visit.       REVIEW OF SYSTEMS:    GENERAL:  No weight loss, malaise or fevers.   HEENT:   No recent changes in vision or hearing  NECK:  Negative for lumps, no difficulty with swallowing.  RESPIRATORY:  Negative for cough, wheezing or shortness of breath, patient denies any recent URI.  CARDIOVASCULAR:  Negative for chest pain, leg swelling or " "palpitations.  GI:  Negative for abdominal discomfort, blood in stools or black stools or change in bowel habits.  MUSCULOSKELETAL:  See HPI.  SKIN:  Negative for lesions, rash, and itching.  PSYCH:  No mood disorder or recent psychosocial stressors.  Patients sleep is not disturbed secondary to pain.  HEMATOLOGY/LYMPHOLOGY:  Negative for prolonged bleeding, bruising easily or swollen nodes.  Patient is not currently taking any anti-coagulants  NEURO:   No history of headaches, syncope, paralysis, seizures or tremors.  All other reviewed and negative other than HPI.    OBJECTIVE:    /67   Pulse (!) 59   Resp 18   Ht 5' 9" (1.753 m)   Wt 74.8 kg (165 lb)   BMI 24.37 kg/m²     PHYSICAL EXAMINATION:    GENERAL: Well appearing, in no acute distress, alert and oriented x3.  PSYCH:  Mood and affect appropriate.  SKIN: Skin color, texture, turgor normal, no rashes or lesions.  HEAD/FACE:  Normocephalic, atraumatic. Cranial nerves grossly intact.    NECK:   - Negative pain to palpation over the cervical paraspinous muscles.   - Spurling  Negative.  - Negative pain with neck flexion, extension, or lateral flexion.     CV: RRR with palpation of the radial artery.  PULM: CTAB. No evidence of respiratory difficulty, symmetric chest rise.  GI:  Soft and non-tender.    MUSKULOSKELETAL:    EXTREMITIES:   left Shoulder Exam:    Negative Edema around the shoulder  Negative Erythema around the shoulder  Negative deformity of the shoulder  Negative Scapular winging  Negative atrophy  Positive Tenderness to Palpation at the lateral deltoid.  Range of Motion is restricted  (+) empty can  (+) lift off test    Test Maneuver Positive/Negative Diagnosis suggested   Apley scratch test   positive Loss of range of motion: rotator cuff problem   Neer's Sign  negative Subacromial impingement   Hawkin's Test  negative Supraspinatus tendon impingement   Drop Arm Test Passively abducting the patient's shoulder, then observing as the " patient slowly lowers the arm to the waist negative Rotator cuff tear   Cross Arm test  negative Acromioclavicular joint arthritis   Apprehension Test  not done Anterior glenohumeral instability   Yergason Test  not done Biceps tendon instability or tendonitis   Speed's  not done Biceps tendon instability or tendonitis   Sulcus Sign  negative Inferior glenohumeral instability     MUSCULOSKELETAL:  No atrophy or tone abnormalities are noted in the UE or LE.  No deformities, edema, or skin discoloration are noted on visible skin. Good capillary refill.    NEURO: Bilateral upper and lower extremity coordination and muscle stretch reflexes are physiologic and symmetric.      NEUROLOGICAL EXAM:  MENTAL STATUS: A x O x 3, good concentration, speech is fluent and goal directed  MEMORY: recent and remote are intact  CN: CN2-12 grossly intact  MOTOR: 5/5 in all muscle groups  DTRs: 2+ intact symmetric  Sensation:    -no Loss of sensation in a left upper and right upper C-4, C-5, C-6, C-7 and C-8 bilaterally distribution.    Martini: absent on the bilateral side(s)  Clonus: absent on the bilateral side(s)    GAIT: normal.

## 2022-09-13 ENCOUNTER — PATIENT OUTREACH (OUTPATIENT)
Dept: ADMINISTRATIVE | Facility: HOSPITAL | Age: 47
End: 2022-09-13
Payer: COMMERCIAL

## 2022-11-07 ENCOUNTER — TELEPHONE (OUTPATIENT)
Dept: SMOKING CESSATION | Facility: CLINIC | Age: 47
End: 2022-11-07
Payer: COMMERCIAL

## 2022-11-08 ENCOUNTER — TELEPHONE (OUTPATIENT)
Dept: INTERNAL MEDICINE | Facility: CLINIC | Age: 47
End: 2022-11-08
Payer: COMMERCIAL

## 2022-11-08 NOTE — TELEPHONE ENCOUNTER
I received a notice from [employer], asking for a prior authorization on this pt's rx of Albuterol Sulfate  (90 Base)MCG/ACT aerosol, under your name. Would you like me to complete the PA?        Pierre Edmonds Parker: [id number] - Rx #: [id number]  Need help? Call us at [phone]  Status  New(Not sent to plan)  Drug  Albuterol Sulfate  (90 Base)MCG/ACT aerosol  Form  [facility] Electronic PA Form (2017 NCPDP)  Original Claim Info  75 +MST USE ALBUTEROL SULFATE CFC-FREE AEROSOL (EXC. NDC [id number]), LEVALBUTEROLTARTRATE CFC-FREE AEROSOL [id number] REQUIRES PRIOR AUTHORIZATION(PHARMACY HELP DESK [phone])

## 2022-11-08 NOTE — TELEPHONE ENCOUNTER
I have initialized and completed the PA for the pt's rx of Albuterol Sulfate HFA, awaiting approval/denial. See below for key and confirmation. I spoke to pharmacy staff to confirm that the rx is in fact for an inhaler, not the aerosol. Staff confirms that they only carry the inhaler, and that is what the pt will receive.          CURT TAYLOR Key: [id number] - PA Case ID: [id number] - Rx #: [id number] help? Call us at [phone]  Status  Sent to [facility]  Drug  Albuterol Sulfate  (90 Base)MCG/ACT aerosol  Form  [facility] Electronic PA Form (2017 NCPDP)  Original Claim Info  75 +MST USE ALBUTEROL SULFATE CFC-FREE AEROSOL (EXC. NDC [id number]), LEVALBUTEROLTARTRATE CFC-FREE AEROSOL [id number] REQUIRES PRIOR AUTHORIZATION(PHARMACY HELP DESK [phone])

## 2022-12-08 ENCOUNTER — OFFICE VISIT (OUTPATIENT)
Dept: INTERNAL MEDICINE | Facility: CLINIC | Age: 47
End: 2022-12-08
Payer: COMMERCIAL

## 2022-12-08 ENCOUNTER — LAB VISIT (OUTPATIENT)
Dept: LAB | Facility: HOSPITAL | Age: 47
End: 2022-12-08
Attending: INTERNAL MEDICINE
Payer: COMMERCIAL

## 2022-12-08 VITALS
DIASTOLIC BLOOD PRESSURE: 64 MMHG | HEIGHT: 69 IN | SYSTOLIC BLOOD PRESSURE: 106 MMHG | HEART RATE: 62 BPM | WEIGHT: 149.94 LBS | BODY MASS INDEX: 22.21 KG/M2 | OXYGEN SATURATION: 96 %

## 2022-12-08 DIAGNOSIS — F31.77 BIPOLAR DISORDER, IN PARTIAL REMISSION, MOST RECENT EPISODE MIXED: ICD-10-CM

## 2022-12-08 DIAGNOSIS — F17.200 SMOKER: ICD-10-CM

## 2022-12-08 DIAGNOSIS — E11.9 CONTROLLED TYPE 2 DIABETES MELLITUS WITHOUT COMPLICATION, WITH LONG-TERM CURRENT USE OF INSULIN: Primary | ICD-10-CM

## 2022-12-08 DIAGNOSIS — Z79.4 CONTROLLED TYPE 2 DIABETES MELLITUS WITHOUT COMPLICATION, WITH LONG-TERM CURRENT USE OF INSULIN: ICD-10-CM

## 2022-12-08 DIAGNOSIS — E11.9 CONTROLLED TYPE 2 DIABETES MELLITUS WITHOUT COMPLICATION, WITH LONG-TERM CURRENT USE OF INSULIN: ICD-10-CM

## 2022-12-08 DIAGNOSIS — Z79.4 CONTROLLED TYPE 2 DIABETES MELLITUS WITHOUT COMPLICATION, WITH LONG-TERM CURRENT USE OF INSULIN: Primary | ICD-10-CM

## 2022-12-08 LAB
ALBUMIN SERPL BCP-MCNC: 4.2 G/DL (ref 3.5–5.2)
ALP SERPL-CCNC: 62 U/L (ref 55–135)
ALT SERPL W/O P-5'-P-CCNC: 16 U/L (ref 10–44)
ANION GAP SERPL CALC-SCNC: 9 MMOL/L (ref 8–16)
AST SERPL-CCNC: 18 U/L (ref 10–40)
BILIRUB SERPL-MCNC: 0.4 MG/DL (ref 0.1–1)
BUN SERPL-MCNC: 15 MG/DL (ref 6–20)
CALCIUM SERPL-MCNC: 9.5 MG/DL (ref 8.7–10.5)
CHLORIDE SERPL-SCNC: 103 MMOL/L (ref 95–110)
CO2 SERPL-SCNC: 24 MMOL/L (ref 23–29)
CREAT SERPL-MCNC: 1.1 MG/DL (ref 0.5–1.4)
EST. GFR  (NO RACE VARIABLE): >60 ML/MIN/1.73 M^2
ESTIMATED AVG GLUCOSE: 126 MG/DL (ref 68–131)
GLUCOSE SERPL-MCNC: 75 MG/DL (ref 70–110)
HBA1C MFR BLD: 6 % (ref 4–5.6)
POTASSIUM SERPL-SCNC: 4.3 MMOL/L (ref 3.5–5.1)
PROT SERPL-MCNC: 6.9 G/DL (ref 6–8.4)
SODIUM SERPL-SCNC: 136 MMOL/L (ref 136–145)

## 2022-12-08 PROCEDURE — 1160F PR REVIEW ALL MEDS BY PRESCRIBER/CLIN PHARMACIST DOCUMENTED: ICD-10-PCS | Mod: CPTII,S$GLB,, | Performed by: INTERNAL MEDICINE

## 2022-12-08 PROCEDURE — 3008F BODY MASS INDEX DOCD: CPT | Mod: CPTII,S$GLB,, | Performed by: INTERNAL MEDICINE

## 2022-12-08 PROCEDURE — 1159F MED LIST DOCD IN RCRD: CPT | Mod: CPTII,S$GLB,, | Performed by: INTERNAL MEDICINE

## 2022-12-08 PROCEDURE — 1160F RVW MEDS BY RX/DR IN RCRD: CPT | Mod: CPTII,S$GLB,, | Performed by: INTERNAL MEDICINE

## 2022-12-08 PROCEDURE — 3044F HG A1C LEVEL LT 7.0%: CPT | Mod: CPTII,S$GLB,, | Performed by: INTERNAL MEDICINE

## 2022-12-08 PROCEDURE — 3074F SYST BP LT 130 MM HG: CPT | Mod: CPTII,S$GLB,, | Performed by: INTERNAL MEDICINE

## 2022-12-08 PROCEDURE — 99214 PR OFFICE/OUTPT VISIT, EST, LEVL IV, 30-39 MIN: ICD-10-PCS | Mod: S$GLB,,, | Performed by: INTERNAL MEDICINE

## 2022-12-08 PROCEDURE — 83036 HEMOGLOBIN GLYCOSYLATED A1C: CPT | Performed by: INTERNAL MEDICINE

## 2022-12-08 PROCEDURE — 3061F NEG MICROALBUMINURIA REV: CPT | Mod: CPTII,S$GLB,, | Performed by: INTERNAL MEDICINE

## 2022-12-08 PROCEDURE — 3044F PR MOST RECENT HEMOGLOBIN A1C LEVEL <7.0%: ICD-10-PCS | Mod: CPTII,S$GLB,, | Performed by: INTERNAL MEDICINE

## 2022-12-08 PROCEDURE — 3078F PR MOST RECENT DIASTOLIC BLOOD PRESSURE < 80 MM HG: ICD-10-PCS | Mod: CPTII,S$GLB,, | Performed by: INTERNAL MEDICINE

## 2022-12-08 PROCEDURE — 36415 COLL VENOUS BLD VENIPUNCTURE: CPT | Performed by: INTERNAL MEDICINE

## 2022-12-08 PROCEDURE — 99214 OFFICE O/P EST MOD 30 MIN: CPT | Mod: S$GLB,,, | Performed by: INTERNAL MEDICINE

## 2022-12-08 PROCEDURE — 3074F PR MOST RECENT SYSTOLIC BLOOD PRESSURE < 130 MM HG: ICD-10-PCS | Mod: CPTII,S$GLB,, | Performed by: INTERNAL MEDICINE

## 2022-12-08 PROCEDURE — 99999 PR PBB SHADOW E&M-EST. PATIENT-LVL IV: CPT | Mod: PBBFAC,,, | Performed by: INTERNAL MEDICINE

## 2022-12-08 PROCEDURE — 3078F DIAST BP <80 MM HG: CPT | Mod: CPTII,S$GLB,, | Performed by: INTERNAL MEDICINE

## 2022-12-08 PROCEDURE — 3061F PR NEG MICROALBUMINURIA RESULT DOCUMENTED/REVIEW: ICD-10-PCS | Mod: CPTII,S$GLB,, | Performed by: INTERNAL MEDICINE

## 2022-12-08 PROCEDURE — 3066F PR DOCUMENTATION OF TREATMENT FOR NEPHROPATHY: ICD-10-PCS | Mod: CPTII,S$GLB,, | Performed by: INTERNAL MEDICINE

## 2022-12-08 PROCEDURE — 3066F NEPHROPATHY DOC TX: CPT | Mod: CPTII,S$GLB,, | Performed by: INTERNAL MEDICINE

## 2022-12-08 PROCEDURE — 99999 PR PBB SHADOW E&M-EST. PATIENT-LVL IV: ICD-10-PCS | Mod: PBBFAC,,, | Performed by: INTERNAL MEDICINE

## 2022-12-08 PROCEDURE — 80053 COMPREHEN METABOLIC PANEL: CPT | Performed by: INTERNAL MEDICINE

## 2022-12-08 PROCEDURE — 1159F PR MEDICATION LIST DOCUMENTED IN MEDICAL RECORD: ICD-10-PCS | Mod: CPTII,S$GLB,, | Performed by: INTERNAL MEDICINE

## 2022-12-08 PROCEDURE — 3008F PR BODY MASS INDEX (BMI) DOCUMENTED: ICD-10-PCS | Mod: CPTII,S$GLB,, | Performed by: INTERNAL MEDICINE

## 2022-12-08 RX ORDER — PEN NEEDLE, DIABETIC 30 GX3/16"
NEEDLE, DISPOSABLE MISCELLANEOUS
Qty: 200 EACH | Refills: 3 | Status: SHIPPED | OUTPATIENT
Start: 2022-12-08

## 2022-12-08 RX ORDER — DEXTROSE 4 G
TABLET,CHEWABLE ORAL
Qty: 1 EACH | Refills: 0 | Status: SHIPPED | OUTPATIENT
Start: 2022-12-08

## 2022-12-08 RX ORDER — LANCETS
EACH MISCELLANEOUS
Qty: 100 EACH | Refills: 3 | Status: SHIPPED | OUTPATIENT
Start: 2022-12-08

## 2022-12-08 NOTE — PROGRESS NOTES
Pt. states he was hit with golf ball to right side of head this morning. Laceration to right side of head- no active bleeding at this time. c/o ringing to right ear and lightheadedness. Denies blurry vision or photophobia. Pt. on coumadin for afib. PMHx: HLD, HLD, DM fs =151 Subjective:       Patient ID: Pierre Edmonds Jr. is a 47 y.o. male.    Chief Complaint:   Follow-up    HPI - Mr. Edmonds feels well.  Not regularly checking sugars b/c supplies are expensive, but A1C's have been fine.  He is still smoking, trying to quit.  He's taking other medications as prescribed. Due for tdap.      PMH:  DM2, insulin requiring.  controlled  Bipolar Disorder  History of multiple HNPs with pain  Cigarette smoker  IBS  GERD     Meds:  Reviewed and reconciled in EPIC with patient during visit today.     Review of Systems   Constitutional:  Negative for fever.   HENT:  Negative for congestion.    Respiratory:  Negative for shortness of breath.    Cardiovascular:  Negative for chest pain.   Gastrointestinal:  Negative for abdominal pain.   Genitourinary:  Negative for difficulty urinating.   Musculoskeletal:  Negative for arthralgias.   Skin:  Negative for rash.   Neurological:  Negative for headaches.   Psychiatric/Behavioral:  Negative for sleep disturbance.      Objective:      Physical Exam  Constitutional:       General: He is not in acute distress.     Appearance: He is well-developed and normal weight. He is not diaphoretic.      Comments: Lean, tattoo'd man smells of cigarettes   HENT:      Head: Normocephalic and atraumatic.   Cardiovascular:      Rate and Rhythm: Normal rate and regular rhythm.      Heart sounds: Normal heart sounds. No murmur heard.    No friction rub. No gallop.   Pulmonary:      Effort: No respiratory distress.      Breath sounds: No wheezing or rales.   Chest:      Chest wall: No tenderness.   Skin:     General: Skin is warm.      Findings: No erythema.   Neurological:      Mental Status: He is alert and oriented to person, place, and time.   Psychiatric:         Thought Content: Thought content normal.       Assessment:       1. Controlled type 2 diabetes mellitus without complication, with long-term current use of insulin    2. Smoker    3. Bipolar disorder, in partial  "remission, most recent episode mixed    4. Diabetes mellitus type 2, controlled          Plan:       Pierre was seen today for follow-up.    Diagnoses and all orders for this visit:    Controlled type 2 diabetes mellitus without complication, with long-term current use of insulin - has been at goal.  Due for labs.  -     COMPREHENSIVE METABOLIC PANEL; Future  -     Hemoglobin A1C; Future    Smoker - encouraged cessation    Bipolar disorder, in partial remission, most recent episode mixed - stable    Diabetes mellitus type 2, controlled - refills today for meter and supplies to see if it's affordable here  -     pen needle, diabetic (BD ULTRA-FINE ARMANDO PEN NEEDLE) 32 gauge x 5/32" Ndle; USE FOR INSULIN INJECTION DAILY  -     blood-glucose meter kit; To check BG 1 times daily, to use with insurance preferred meter  -     lancets Misc; To check BG 1 times daily, to use with insurance preferred meter  -     blood sugar diagnostic Strp; To check BG 1 times daily, to use with insurance preferred meter    Rtc carlos eduardo Sanchez MD MPH  Staff Internist          "

## 2022-12-09 ENCOUNTER — PATIENT MESSAGE (OUTPATIENT)
Dept: INTERNAL MEDICINE | Facility: CLINIC | Age: 47
End: 2022-12-09
Payer: COMMERCIAL

## 2022-12-09 DIAGNOSIS — Z79.4 CONTROLLED TYPE 2 DIABETES MELLITUS WITHOUT COMPLICATION, WITH LONG-TERM CURRENT USE OF INSULIN: ICD-10-CM

## 2022-12-09 DIAGNOSIS — E11.9 CONTROLLED TYPE 2 DIABETES MELLITUS WITHOUT COMPLICATION, WITH LONG-TERM CURRENT USE OF INSULIN: ICD-10-CM

## 2022-12-09 RX ORDER — INSULIN DETEMIR 100 [IU]/ML
15 INJECTION, SOLUTION SUBCUTANEOUS NIGHTLY
Qty: 15 ML | Refills: 3 | Status: SHIPPED | OUTPATIENT
Start: 2022-12-09 | End: 2023-06-09 | Stop reason: SDUPTHER

## 2023-03-07 ENCOUNTER — OFFICE VISIT (OUTPATIENT)
Dept: INTERNAL MEDICINE | Facility: CLINIC | Age: 48
End: 2023-03-07
Payer: COMMERCIAL

## 2023-03-07 ENCOUNTER — LAB VISIT (OUTPATIENT)
Dept: LAB | Facility: HOSPITAL | Age: 48
End: 2023-03-07
Attending: INTERNAL MEDICINE
Payer: COMMERCIAL

## 2023-03-07 VITALS
BODY MASS INDEX: 22.89 KG/M2 | HEART RATE: 69 BPM | WEIGHT: 154.56 LBS | HEIGHT: 69 IN | DIASTOLIC BLOOD PRESSURE: 78 MMHG | OXYGEN SATURATION: 97 % | SYSTOLIC BLOOD PRESSURE: 110 MMHG

## 2023-03-07 DIAGNOSIS — F31.77 BIPOLAR DISORDER, IN PARTIAL REMISSION, MOST RECENT EPISODE MIXED: ICD-10-CM

## 2023-03-07 DIAGNOSIS — F17.200 SMOKER: ICD-10-CM

## 2023-03-07 DIAGNOSIS — E78.5 HYPERLIPIDEMIA ASSOCIATED WITH TYPE 2 DIABETES MELLITUS: ICD-10-CM

## 2023-03-07 DIAGNOSIS — E11.69 HYPERLIPIDEMIA ASSOCIATED WITH TYPE 2 DIABETES MELLITUS: ICD-10-CM

## 2023-03-07 DIAGNOSIS — B35.3 TINEA PEDIS OF BOTH FEET: ICD-10-CM

## 2023-03-07 DIAGNOSIS — Z79.4 CONTROLLED TYPE 2 DIABETES MELLITUS WITHOUT COMPLICATION, WITH LONG-TERM CURRENT USE OF INSULIN: Primary | ICD-10-CM

## 2023-03-07 DIAGNOSIS — E11.9 CONTROLLED TYPE 2 DIABETES MELLITUS WITHOUT COMPLICATION, WITH LONG-TERM CURRENT USE OF INSULIN: Primary | ICD-10-CM

## 2023-03-07 DIAGNOSIS — Z79.4 CONTROLLED TYPE 2 DIABETES MELLITUS WITHOUT COMPLICATION, WITH LONG-TERM CURRENT USE OF INSULIN: ICD-10-CM

## 2023-03-07 DIAGNOSIS — E11.9 CONTROLLED TYPE 2 DIABETES MELLITUS WITHOUT COMPLICATION, WITH LONG-TERM CURRENT USE OF INSULIN: ICD-10-CM

## 2023-03-07 DIAGNOSIS — D12.6 ADENOMATOUS POLYP OF COLON, UNSPECIFIED PART OF COLON: ICD-10-CM

## 2023-03-07 LAB
ALBUMIN SERPL BCP-MCNC: 4.2 G/DL (ref 3.5–5.2)
ALBUMIN/CREAT UR: NORMAL UG/MG (ref 0–30)
ALP SERPL-CCNC: 62 U/L (ref 55–135)
ALT SERPL W/O P-5'-P-CCNC: 20 U/L (ref 10–44)
ANION GAP SERPL CALC-SCNC: 5 MMOL/L (ref 8–16)
AST SERPL-CCNC: 20 U/L (ref 10–40)
BILIRUB SERPL-MCNC: 0.3 MG/DL (ref 0.1–1)
BUN SERPL-MCNC: 13 MG/DL (ref 6–20)
CALCIUM SERPL-MCNC: 9.7 MG/DL (ref 8.7–10.5)
CHLORIDE SERPL-SCNC: 104 MMOL/L (ref 95–110)
CHOLEST SERPL-MCNC: 155 MG/DL (ref 120–199)
CHOLEST/HDLC SERPL: 3.1 {RATIO} (ref 2–5)
CO2 SERPL-SCNC: 30 MMOL/L (ref 23–29)
CREAT SERPL-MCNC: 1.2 MG/DL (ref 0.5–1.4)
CREAT UR-MCNC: 60 MG/DL (ref 23–375)
EST. GFR  (NO RACE VARIABLE): >60 ML/MIN/1.73 M^2
ESTIMATED AVG GLUCOSE: 140 MG/DL (ref 68–131)
GLUCOSE SERPL-MCNC: 92 MG/DL (ref 70–110)
HBA1C MFR BLD: 6.5 % (ref 4–5.6)
HDLC SERPL-MCNC: 50 MG/DL (ref 40–75)
HDLC SERPL: 32.3 % (ref 20–50)
LDLC SERPL CALC-MCNC: 86.2 MG/DL (ref 63–159)
MICROALBUMIN UR DL<=1MG/L-MCNC: <5 UG/ML
NONHDLC SERPL-MCNC: 105 MG/DL
POTASSIUM SERPL-SCNC: 4.8 MMOL/L (ref 3.5–5.1)
PROT SERPL-MCNC: 6.9 G/DL (ref 6–8.4)
SODIUM SERPL-SCNC: 139 MMOL/L (ref 136–145)
TRIGL SERPL-MCNC: 94 MG/DL (ref 30–150)

## 2023-03-07 PROCEDURE — 3074F SYST BP LT 130 MM HG: CPT | Mod: CPTII,S$GLB,, | Performed by: INTERNAL MEDICINE

## 2023-03-07 PROCEDURE — 80061 LIPID PANEL: CPT | Performed by: INTERNAL MEDICINE

## 2023-03-07 PROCEDURE — 99999 PR PBB SHADOW E&M-EST. PATIENT-LVL IV: ICD-10-PCS | Mod: PBBFAC,,, | Performed by: INTERNAL MEDICINE

## 2023-03-07 PROCEDURE — 1159F MED LIST DOCD IN RCRD: CPT | Mod: CPTII,S$GLB,, | Performed by: INTERNAL MEDICINE

## 2023-03-07 PROCEDURE — 83036 HEMOGLOBIN GLYCOSYLATED A1C: CPT | Performed by: INTERNAL MEDICINE

## 2023-03-07 PROCEDURE — 3008F PR BODY MASS INDEX (BMI) DOCUMENTED: ICD-10-PCS | Mod: CPTII,S$GLB,, | Performed by: INTERNAL MEDICINE

## 2023-03-07 PROCEDURE — 3074F PR MOST RECENT SYSTOLIC BLOOD PRESSURE < 130 MM HG: ICD-10-PCS | Mod: CPTII,S$GLB,, | Performed by: INTERNAL MEDICINE

## 2023-03-07 PROCEDURE — 99214 PR OFFICE/OUTPT VISIT, EST, LEVL IV, 30-39 MIN: ICD-10-PCS | Mod: S$GLB,,, | Performed by: INTERNAL MEDICINE

## 2023-03-07 PROCEDURE — 3078F PR MOST RECENT DIASTOLIC BLOOD PRESSURE < 80 MM HG: ICD-10-PCS | Mod: CPTII,S$GLB,, | Performed by: INTERNAL MEDICINE

## 2023-03-07 PROCEDURE — 99214 OFFICE O/P EST MOD 30 MIN: CPT | Mod: S$GLB,,, | Performed by: INTERNAL MEDICINE

## 2023-03-07 PROCEDURE — 1160F PR REVIEW ALL MEDS BY PRESCRIBER/CLIN PHARMACIST DOCUMENTED: ICD-10-PCS | Mod: CPTII,S$GLB,, | Performed by: INTERNAL MEDICINE

## 2023-03-07 PROCEDURE — 3008F BODY MASS INDEX DOCD: CPT | Mod: CPTII,S$GLB,, | Performed by: INTERNAL MEDICINE

## 2023-03-07 PROCEDURE — 1160F RVW MEDS BY RX/DR IN RCRD: CPT | Mod: CPTII,S$GLB,, | Performed by: INTERNAL MEDICINE

## 2023-03-07 PROCEDURE — 3078F DIAST BP <80 MM HG: CPT | Mod: CPTII,S$GLB,, | Performed by: INTERNAL MEDICINE

## 2023-03-07 PROCEDURE — 80053 COMPREHEN METABOLIC PANEL: CPT | Performed by: INTERNAL MEDICINE

## 2023-03-07 PROCEDURE — 1159F PR MEDICATION LIST DOCUMENTED IN MEDICAL RECORD: ICD-10-PCS | Mod: CPTII,S$GLB,, | Performed by: INTERNAL MEDICINE

## 2023-03-07 PROCEDURE — 99999 PR PBB SHADOW E&M-EST. PATIENT-LVL IV: CPT | Mod: PBBFAC,,, | Performed by: INTERNAL MEDICINE

## 2023-03-07 PROCEDURE — 82570 ASSAY OF URINE CREATININE: CPT | Performed by: INTERNAL MEDICINE

## 2023-03-07 PROCEDURE — 36415 COLL VENOUS BLD VENIPUNCTURE: CPT | Performed by: INTERNAL MEDICINE

## 2023-03-07 NOTE — PROGRESS NOTES
Subjective:       Patient ID: Pierre Edmonds Jr. is a 47 y.o. male.    Chief Complaint:   Follow-up    HPI -   He is still smoking, but has had nearly a pack per day out of his regimen.  He is working on quitting because he feels symptomatic.  Colonoscopy in August showed tubular adenomas.  Repeat in 3 years.  He is due for some diabetic health maintenance.  Declines COVID vaccination.  He has recurring tinea pedis.    PMH:  DM2, insulin requiring.  controlled  Bipolar Disorder  History of multiple HNPs with pain  Cigarette smoker  IBS  GERD  Colon polyps.  Repeat colonoscopy 2025     Meds:  Reviewed and reconciled in EPIC with patient during visit today.     Review of Systems   Constitutional:  Negative for fever.   HENT:  Negative for congestion.    Respiratory:  Positive for cough.    Cardiovascular:  Negative for chest pain.   Gastrointestinal:  Negative for abdominal pain.   Genitourinary:  Negative for difficulty urinating.   Musculoskeletal:  Negative for arthralgias.   Skin:  Negative for rash.   Neurological:  Negative for headaches.   Psychiatric/Behavioral:  Negative for sleep disturbance.      Objective:      Physical Exam  Vitals reviewed.   Constitutional:       General: He is not in acute distress.     Appearance: He is well-developed and normal weight. He is not diaphoretic.   HENT:      Head: Normocephalic and atraumatic.   Cardiovascular:      Rate and Rhythm: Normal rate and regular rhythm.      Pulses:           Dorsalis pedis pulses are 2+ on the right side and 2+ on the left side.      Heart sounds: Normal heart sounds. No murmur heard.    No friction rub. No gallop.   Pulmonary:      Effort: No respiratory distress.      Breath sounds: No wheezing or rales.   Chest:      Chest wall: No tenderness.   Feet:      Right foot:      Protective Sensation: 4 sites tested.  4 sites sensed.      Skin integrity: No ulcer, blister or skin breakdown.      Toenail Condition: Right toenails are normal.       Left foot:      Protective Sensation: 4 sites tested.  4 sites sensed.      Skin integrity: No ulcer, blister or skin breakdown.      Toenail Condition: Left toenails are normal.   Skin:     General: Skin is warm.      Findings: No erythema.   Neurological:      Mental Status: He is alert and oriented to person, place, and time.   Psychiatric:         Thought Content: Thought content normal.       Assessment:       1. Controlled type 2 diabetes mellitus without complication, with long-term current use of insulin    2. Hyperlipidemia associated with type 2 diabetes mellitus    3. Smoker    4. Bipolar disorder, in partial remission, most recent episode mixed    5. Tinea pedis of both feet    6. Adenomatous polyp of colon, unspecified part of colon          Plan:       Pierre was seen today for follow-up.    Diagnoses and all orders for this visit:    Controlled type 2 diabetes mellitus without complication, with long-term current use of insulin -   This was overtreated in December, so we reduced his insulin regimen.  Time for an A1c to see where he stands now.    -     COMPREHENSIVE METABOLIC PANEL; Future  -     Hemoglobin A1C; Future  -     Microalbumin/Creatinine Ratio, Urine    Hyperlipidemia associated with type 2 diabetes mellitus -  He is due for lipid panel to see how statin is working.  -     Lipid panel; Future    Smoker - gave positive FB on attempts to quit    Bipolar disorder, in partial remission, most recent episode mixed    Tinea pedis of both feet - discussed keeping the area dry, changing socks regularly and using powder    Adenomatous polyp of colon, unspecified part of colon    Rtc 3 months    TERENCE Sanchez MD MPH  Staff Internist

## 2023-05-17 ENCOUNTER — OFFICE VISIT (OUTPATIENT)
Dept: PAIN MEDICINE | Facility: CLINIC | Age: 48
End: 2023-05-17
Payer: COMMERCIAL

## 2023-05-17 VITALS
SYSTOLIC BLOOD PRESSURE: 115 MMHG | HEIGHT: 69 IN | DIASTOLIC BLOOD PRESSURE: 69 MMHG | WEIGHT: 154.56 LBS | BODY MASS INDEX: 22.89 KG/M2 | HEART RATE: 70 BPM

## 2023-05-17 DIAGNOSIS — M54.12 CERVICAL RADICULOPATHY: ICD-10-CM

## 2023-05-17 DIAGNOSIS — M54.16 LEFT LUMBAR RADICULOPATHY: Primary | ICD-10-CM

## 2023-05-17 DIAGNOSIS — M75.52 SUBACROMIAL BURSITIS OF LEFT SHOULDER JOINT: ICD-10-CM

## 2023-05-17 DIAGNOSIS — M67.814 TENDINOSIS OF LEFT SHOULDER: ICD-10-CM

## 2023-05-17 DIAGNOSIS — M54.9 DORSALGIA, UNSPECIFIED: ICD-10-CM

## 2023-05-17 PROCEDURE — 3008F BODY MASS INDEX DOCD: CPT | Mod: CPTII,S$GLB,, | Performed by: STUDENT IN AN ORGANIZED HEALTH CARE EDUCATION/TRAINING PROGRAM

## 2023-05-17 PROCEDURE — 3008F PR BODY MASS INDEX (BMI) DOCUMENTED: ICD-10-PCS | Mod: CPTII,S$GLB,, | Performed by: STUDENT IN AN ORGANIZED HEALTH CARE EDUCATION/TRAINING PROGRAM

## 2023-05-17 PROCEDURE — 99999 PR PBB SHADOW E&M-EST. PATIENT-LVL IV: ICD-10-PCS | Mod: PBBFAC,,, | Performed by: STUDENT IN AN ORGANIZED HEALTH CARE EDUCATION/TRAINING PROGRAM

## 2023-05-17 PROCEDURE — 3044F HG A1C LEVEL LT 7.0%: CPT | Mod: CPTII,S$GLB,, | Performed by: STUDENT IN AN ORGANIZED HEALTH CARE EDUCATION/TRAINING PROGRAM

## 2023-05-17 PROCEDURE — 99214 OFFICE O/P EST MOD 30 MIN: CPT | Mod: S$GLB,,, | Performed by: STUDENT IN AN ORGANIZED HEALTH CARE EDUCATION/TRAINING PROGRAM

## 2023-05-17 PROCEDURE — 1159F MED LIST DOCD IN RCRD: CPT | Mod: CPTII,S$GLB,, | Performed by: STUDENT IN AN ORGANIZED HEALTH CARE EDUCATION/TRAINING PROGRAM

## 2023-05-17 PROCEDURE — 99999 PR PBB SHADOW E&M-EST. PATIENT-LVL IV: CPT | Mod: PBBFAC,,, | Performed by: STUDENT IN AN ORGANIZED HEALTH CARE EDUCATION/TRAINING PROGRAM

## 2023-05-17 PROCEDURE — 3044F PR MOST RECENT HEMOGLOBIN A1C LEVEL <7.0%: ICD-10-PCS | Mod: CPTII,S$GLB,, | Performed by: STUDENT IN AN ORGANIZED HEALTH CARE EDUCATION/TRAINING PROGRAM

## 2023-05-17 PROCEDURE — 99214 PR OFFICE/OUTPT VISIT, EST, LEVL IV, 30-39 MIN: ICD-10-PCS | Mod: S$GLB,,, | Performed by: STUDENT IN AN ORGANIZED HEALTH CARE EDUCATION/TRAINING PROGRAM

## 2023-05-17 PROCEDURE — 3066F PR DOCUMENTATION OF TREATMENT FOR NEPHROPATHY: ICD-10-PCS | Mod: CPTII,S$GLB,, | Performed by: STUDENT IN AN ORGANIZED HEALTH CARE EDUCATION/TRAINING PROGRAM

## 2023-05-17 PROCEDURE — 3078F DIAST BP <80 MM HG: CPT | Mod: CPTII,S$GLB,, | Performed by: STUDENT IN AN ORGANIZED HEALTH CARE EDUCATION/TRAINING PROGRAM

## 2023-05-17 PROCEDURE — 1159F PR MEDICATION LIST DOCUMENTED IN MEDICAL RECORD: ICD-10-PCS | Mod: CPTII,S$GLB,, | Performed by: STUDENT IN AN ORGANIZED HEALTH CARE EDUCATION/TRAINING PROGRAM

## 2023-05-17 PROCEDURE — 3078F PR MOST RECENT DIASTOLIC BLOOD PRESSURE < 80 MM HG: ICD-10-PCS | Mod: CPTII,S$GLB,, | Performed by: STUDENT IN AN ORGANIZED HEALTH CARE EDUCATION/TRAINING PROGRAM

## 2023-05-17 PROCEDURE — 3061F NEG MICROALBUMINURIA REV: CPT | Mod: CPTII,S$GLB,, | Performed by: STUDENT IN AN ORGANIZED HEALTH CARE EDUCATION/TRAINING PROGRAM

## 2023-05-17 PROCEDURE — 3074F SYST BP LT 130 MM HG: CPT | Mod: CPTII,S$GLB,, | Performed by: STUDENT IN AN ORGANIZED HEALTH CARE EDUCATION/TRAINING PROGRAM

## 2023-05-17 PROCEDURE — 3061F PR NEG MICROALBUMINURIA RESULT DOCUMENTED/REVIEW: ICD-10-PCS | Mod: CPTII,S$GLB,, | Performed by: STUDENT IN AN ORGANIZED HEALTH CARE EDUCATION/TRAINING PROGRAM

## 2023-05-17 PROCEDURE — 3066F NEPHROPATHY DOC TX: CPT | Mod: CPTII,S$GLB,, | Performed by: STUDENT IN AN ORGANIZED HEALTH CARE EDUCATION/TRAINING PROGRAM

## 2023-05-17 PROCEDURE — 3074F PR MOST RECENT SYSTOLIC BLOOD PRESSURE < 130 MM HG: ICD-10-PCS | Mod: CPTII,S$GLB,, | Performed by: STUDENT IN AN ORGANIZED HEALTH CARE EDUCATION/TRAINING PROGRAM

## 2023-05-17 RX ORDER — GABAPENTIN 300 MG/1
300 CAPSULE ORAL 3 TIMES DAILY
Qty: 90 CAPSULE | Refills: 1 | Status: SHIPPED | OUTPATIENT
Start: 2023-05-17 | End: 2023-09-15

## 2023-05-17 RX ORDER — METHYLPREDNISOLONE 4 MG/1
TABLET ORAL
Qty: 21 EACH | Refills: 0 | Status: SHIPPED | OUTPATIENT
Start: 2023-05-17 | End: 2023-06-08

## 2023-05-17 NOTE — PROGRESS NOTES
Chronic Pain - f/u    Referring Physician: No ref. provider found    Date: 05/17/2023     Re: Pierre Edmonds Jr.  MR#: [id number]  YOB: 1975  Age: 47 y.o.    Chief Complaint: shoulder pain  Chief Complaint   Patient presents with    Low-back Pain       **This note is dictated using the M*Modal Fluency Direct word recognition program. There are word recognition mistakes that are occasionally missed on review.**    ASSESSMENT: 47 y.o. year old male with shoulder pain, consistent with     1. Left lumbar radiculopathy  MRI Lumbar Spine Without Contrast    Ambulatory referral/consult to [facility]    methylPREDNISolone (MEDROL DOSEPACK) 4 mg tablet    gabapentin (NEURONTIN) 300 MG capsule      2. Dorsalgia, unspecified  MRI Lumbar Spine Without Contrast      3. Subacromial bursitis of left shoulder joint        4. Tendinosis of left shoulder        5. Cervical radiculopathy          PLAN:     Left lumbar radiculopathy  -issue for several years but acting back up again the last 6 weeks or so  -trial medrol dose graciela  -trial gabapentin 300mg qam and 600mg qhs  -healthy [facility] referral  -new MRI  -if pain does not improve then will likely schedule TFESI    Left shoulder pain / rotator cuff tendinosis / bursitis - mostly resolved  -MRI and XR left shoulder - small partial thickness tear of supraspinatus tendon, insfraspinatus tendinosis, subscao tendinosis. Subacromial bursitis. Moderate AC joint arthropathy. Small labral tears.  -failed PT, oral meds  -s/p left subacromial bursa injection on 8/15/22 - 90% improvement x10m    Tobacco use  -if MRI shows something that needs to be repaired surgically, then the patient will have to stop smoking to increase success chance    - RTC I will call with MRI results and discuss progress  - Counseled patient regarding the importance of activity modification.    The above plan and management options were discussed at length with patient. Patient is in agreement with  the above and verbalized understanding. It will be communicated with the referring physician via electronic record, fax, or mail.  Lab/study reports reviewed were important and necessary because subsequent medical and treatment recommendations required review of the above lab/study reports. Images viewed/reviewed above were important and necessary because subsequent medical and treatment recommendations required review of the reviewed image(s).     Electronically signed by:  Gee Carlisle DO  05/17/2023    =========================================================================================================    SUBJECTIVE:    Interval History 5/17/2023:   Pierre Edmonds Jr. is a 47 y.o. male presents to the clinic for follow up.  Since last visit the pain has has worsened. He feels like his low back has been acting up again.  His left shoulder is doing really well yet.    He has had a left S1 TFESI in 2015 with Dr. Zavala.  He is not getting left sided pain that radiates from the buttocks down the left leg (anterior/lateral) to the front of the shin into the ankle. This has been bothering him for the last 5-6 weeks.  He goes to the chiropractor which helps temporarily. He has not been to the chiropractor in about 2 week.    The pain is located in the lower back area and radiates to the down to knees  .  The pain is described as aching and burning    At BEST  0/10   At WORST  10/10 on the WORST day.    On average pain is rated as 3/10.   Today the pain is rated as 0/10  Symptoms interfere with daily activity and work.   Exacerbating factors: Lifting.    Mitigating factors medications.     Current pain medications: advil   Failed Pain Medications: ibuprofen, tylenol    Pain procedures: neck epidurals in 2018 2015 - Left S1 TFESI x2 with Dr. Zavala  8/15/22 - s/p L subacromial bursa inj w/US    Interval History 9/12/2022:     Pierre Edmonds Jr. is a 47 y.o. male presents to the clinic for follow up.   Since last visit the pain has has significantly improved. He got a subacroimial bursa injection on 8/15/22 and is having 90% improvement in his pain since the injection.  He does not require any pain medications.    The pain is located in the left shoulder area and does not radiate.  The pain is described as burning    At BEST  0/10   At WORST  2/10 on the WORST day.    On average pain is rated as 2/10.   Today the pain is rated as 0/10  Symptoms interfere with daily activity.   Exacerbating factors: randomly comes.    Mitigating factors heat.     Interval History 8/8/2022:     Pierre Edmonds Jr. is a 47 y.o. male presents to the clinic for follow up.  Since last visit the pain has is unchanged.    Had MRI left shoulder:    Impression:   Small partial-thickness rotator cuff tear with tendinosis, as above.  Small labral tears, as above.   Moderate AC joint arthropathy.   Small amount of subdeltoid fluid/ bursitis.    The pain is located in the left shoulder area and radiates to the upper arm .  The pain is described as burning and sharp    At BEST  0/10   At WORST  9/10 on the WORST day.    On average pain is rated as 5/10.   Today the pain is rated as 0/10  Symptoms interfere with daily activity and sleeping.   Exacerbating factors: certain type of movement.    Mitigating factors nothing.     Initial hx:  Pierre Edmonds Jr. is a 47 y.o. male presents to the clinic for the evaluation of left shoulder pain. The pain started 6 + months ago following no inciting event and symptoms have been worsening.  He denies any trauma. He has geo doing PT for 2-3 months and got the ROM back in the shoulder, but the pain has persisted.  His pain is intermittent, and is aggravated by certain movements.  Shoulder extension seems to be what makes the pain better after a few seconds.  He gets it everyday.     Pain Description:    The pain is located in the left shoulder area and does not radiate.  At BEST  0/10   At WORST  9/10 on the  WORST day.    On average pain is rated as 5/10.   Today the pain is rated as 1/10  The pain is intermittent.  The pain is described as burning and sharp    Symptoms interfere with daily activity and sleeping.   Exacerbating factors: certain type of movement.    Mitigating factors pushing the shoulder back.   He reports 7 hours of sleep per night.    Physical Therapy/Home Exercise: No, not currently in physical therapy or home exercise program    Current Pain Medications:    - none    Failed Pain Medications:    - tylenol, ibuprofen    Pain Treatment Therapies:     Physical Therapy: completed 2-3 months ago  Chiropractor: none  Acupuncture: none  TENS unit: none  Spinal decompression: none  Joint replacement: none    Patient denies urinary incontinence, bowel incontinence, significant motor weakness and loss of sensations.  Patient denies any suicidal or homicidal ideations     report:  Reviewed and consistent with medication use as prescribed.    Imaging:   MRI cervical 04/2015:  Technique: Shadow T1, T2 and STIR as well as axial T1 and T2 weighted images were obtained through the cervical spine without contrast.     Findings: There mild Modic degenerative endplate changes at C6-C7 with loss of disk height at this level.  The vertebral bodies otherwise maintain normal height, signal intensity and alignment.  The signal intensity in the cervical spinal cord is normal.    The craniocervical junction shows no abnormalities.  Localizer images show no abnormalities.        C2-C3: No significant disk or joint pathology     C3-C4: Osteophyte formation on the left causes mild left neuroforaminal stenosis.  The right neural foramen and central canal are patent     C4-C5: No significant disk or joint pathology.     C5-C6: Diffuse disk bulge with right disk osteophyte complex causes effacement of the anterior thecal sleeve and mild to moderate right neuroforaminal stenosis.  Mild left neural foraminal stenosis is also  present.     C6-C7: Diffuse disk bulge with small osteophytes results in moderate bilateral neural foraminal stenosis and mild central canal stenosis.     C7-T1: No significant disk or joint pathology.  IMPRESSION:    Multilevel degenerative disk and joint disease which is most pronounced at C5-C6 and C6-C7.    MRI left shoulder 07/26/22:    Impression:    Small partial-thickness rotator cuff tear with tendinosis, as above.     Small labral tears, as above.     Moderate AC joint arthropathy.     Small amount of subdeltoid fluid/ bursitis.    Past Medical History:   Diagnosis Date    Atypical nevus of back excised 8/2019    severely atypical     Diabetes mellitus type II     Fever blister     Herniated lumbar intervertebral disc 2011     Past Surgical History:   Procedure Laterality Date    APPENDECTOMY      COLONOSCOPY N/A 8/19/2022    Procedure: COLONOSCOPY;  Surgeon: Vijaya Diane MD;  Location: [facility] ([address] [place] [address]);  Service: Endoscopy;  Laterality: N/A;  any md-vacc-inst portal-pm prep-clears 4 hrs prior-tb  8/16/22- Pt requesting to stay at current time due to ride , Pt ok with DR. 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   Tobacco Use    Smoking status: Every Day     Packs/day: 1.00     Types: Cigarettes    Smokeless tobacco: Never   Substance and Sexual Activity    Alcohol use: No    Drug use: Yes     Types: Marijuana     Comment: everyday    Sexual activity: Yes     Partners: Female     Family History   Problem Relation Age of Onset    Diabetes Mother     Hypertension Father     Diabetes Maternal Aunt     Diabetes Maternal Grandmother     Melanoma Neg Hx        Review of patient's allergies indicates:   Allergen Reactions    Ceclor [cefaclor] Hives       Current Outpatient Medications   Medication Sig    albuterol (PROVENTIL HFA) 90 mcg/actuation inhaler Inhale 2 puffs  "into the lungs every 6 (six) hours as needed for Wheezing. Rescue    atorvastatin (LIPITOR) 20 MG tablet Take 1 tablet (20 mg total) by mouth once daily.    blood sugar diagnostic Strp To check Blood Glucose 1 times daily, to use with insurance preferred meter    blood-glucose meter Misc To check Blood Glucose 1 times daily, to use with insurance preferred meter    lamoTRIgine (LAMICTAL) 200 MG tablet Take 200 mg by mouth 2 (two) times daily.    lancets Misc To check Blood Glucose 1 times daily, to use with insurance preferred meter    LATUDA 60 mg Tab tablet SMARTSI Tablet(s) By Mouth Every Evening    nicotine (NICODERM CQ) 21 mg/24 hr Place 1 patch on dry clean skin in morning    nicotine, polacrilex, (NICORETTE) 2 mg Gum Take 1 each (2 mg total) by mouth as needed (in place of cigarettes 8-10 times aday). Cinnamon flavor    pen needle, diabetic (BD ULTRA-FINE ARMANDO PEN NEEDLE) 32 gauge x 5/32" Ndle USE FOR INSULIN INJECTION DAILY    gabapentin (NEURONTIN) 300 MG capsule Take 1 capsule (300 mg total) by mouth 3 (three) times daily. Take 1 in the morning and two at night    insulin detemir U-100 (LEVEMIR FLEXTOUCH U-100 INSULN) 100 unit/mL (3 mL) InPn pen Inject 15 Units into the skin every evening.    methylPREDNISolone (MEDROL DOSEPACK) 4 mg tablet use as directed    sildenafiL (VIAGRA) 100 MG tablet Take 1 tablet (100 mg total) by mouth daily as needed for Erectile Dysfunction.     No current facility-administered medications for this visit.       REVIEW OF SYSTEMS:    GENERAL:  No weight loss, malaise or fevers.   HEENT:   No recent changes in vision or hearing  NECK:  Negative for lumps, no difficulty with swallowing.  RESPIRATORY:  Negative for cough, wheezing or shortness of breath, patient denies any recent URI.  CARDIOVASCULAR:  Negative for chest pain, leg swelling or palpitations.  GI:  Negative for abdominal discomfort, blood in stools or black stools or change in bowel habits.  MUSCULOSKELETAL:  See " "HPI.  SKIN:  Negative for lesions, rash, and itching.  PSYCH:  No mood disorder or recent psychosocial stressors.  Patients sleep is not disturbed secondary to pain.  HEMATOLOGY/LYMPHOLOGY:  Negative for prolonged bleeding, bruising easily or swollen nodes.  Patient is not currently taking any anti-coagulants  NEURO:   No history of headaches, syncope, paralysis, seizures or tremors.  All other reviewed and negative other than HPI.    OBJECTIVE:    /69 (BP Location: Left arm, Patient Position: Sitting)   Pulse 70   Ht 5' 9" (1.753 m)   Wt 70.1 kg (154 lb 8.7 oz)   BMI 22.82 kg/m²     PHYSICAL EXAMINATION:    GENERAL: Well appearing, in no acute distress, alert and oriented x3.  PSYCH:  Mood and affect appropriate.  SKIN: Skin color, texture, turgor normal, no rashes or lesions.  HEAD/FACE:  Normocephalic, atraumatic. Cranial nerves grossly intact.    NECK:   - Negative pain to palpation over the cervical paraspinous muscles.   - Spurling  Negative.  - Negative pain with neck flexion, extension, or lateral flexion.     CV: RRR with palpation of the radial artery.  PULM: CTAB. No evidence of respiratory difficulty, symmetric chest rise.  GI:  Soft and non-tender.    MUSKULOSKELETAL:    EXTREMITIES:   left Shoulder Exam: not done today    Back:  (+) left SLR  (+) left seated slump test  (+) femoral nerve stretch test    MUSCULOSKELETAL:  No atrophy or tone abnormalities are noted in the UE or LE.  No deformities, edema, or skin discoloration are noted on visible skin. Good capillary refill.    NEURO: Bilateral upper and lower extremity coordination and muscle stretch reflexes are physiologic and symmetric.      NEUROLOGICAL EXAM:  MENTAL STATUS: A x O x 3, good concentration, speech is fluent and goal directed  MEMORY: recent and remote are intact  CN: CN2-12 grossly intact  MOTOR: 5/5 in all muscle groups except left HF 4/5, KE 4/5 and left DF 4/5  DTRs: 2+ intact symmetric  Sensation:    -no Loss of " sensation in a left upper and right upper C-4, C-5, C-6, C-7 and C-8 bilaterally distribution.    Martini: absent on the bilateral side(s)  Clonus: absent on the bilateral side(s)    GAIT: normal.

## 2023-05-17 NOTE — H&P (VIEW-ONLY)
Chronic Pain - f/u    Referring Physician: No ref. provider found    Date: 05/17/2023     Re: Pierre Edmonds Jr.  MR#: [id number]  YOB: 1975  Age: 47 y.o.    Chief Complaint: shoulder pain  Chief Complaint   Patient presents with    Low-back Pain       **This note is dictated using the M*Modal Fluency Direct word recognition program. There are word recognition mistakes that are occasionally missed on review.**    ASSESSMENT: 47 y.o. year old male with shoulder pain, consistent with     1. Left lumbar radiculopathy  MRI Lumbar Spine Without Contrast    Ambulatory referral/consult to [facility]    methylPREDNISolone (MEDROL DOSEPACK) 4 mg tablet    gabapentin (NEURONTIN) 300 MG capsule      2. Dorsalgia, unspecified  MRI Lumbar Spine Without Contrast      3. Subacromial bursitis of left shoulder joint        4. Tendinosis of left shoulder        5. Cervical radiculopathy          PLAN:     Left lumbar radiculopathy  -issue for several years but acting back up again the last 6 weeks or so  -trial medrol dose graciela  -trial gabapentin 300mg qam and 600mg qhs  -healthy back referral  -new MRI  -if pain does not improve then will likely schedule TFESI    Left shoulder pain / rotator cuff tendinosis / bursitis - mostly resolved  -MRI and XR left shoulder - small partial thickness tear of supraspinatus tendon, insfraspinatus tendinosis, subscao tendinosis. Subacromial bursitis. Moderate AC joint arthropathy. Small labral tears.  -failed PT, oral meds  -s/p left subacromial bursa injection on 8/15/22 - 90% improvement x10m    Tobacco use  -if MRI shows something that needs to be repaired surgically, then the patient will have to stop smoking to increase success chance    - RTC I will call with MRI results and discuss progress  - Counseled patient regarding the importance of activity modification.    The above plan and management options were discussed at length with patient. Patient is in agreement with  the above and verbalized understanding. It will be communicated with the referring physician via electronic record, fax, or mail.  Lab/study reports reviewed were important and necessary because subsequent medical and treatment recommendations required review of the above lab/study reports. Images viewed/reviewed above were important and necessary because subsequent medical and treatment recommendations required review of the reviewed image(s).     Electronically signed by:  Gee Carlisle DO  05/17/2023    =========================================================================================================    SUBJECTIVE:    Interval History 5/17/2023:   Pierre Edmonds Jr. is a 47 y.o. male presents to the clinic for follow up.  Since last visit the pain has has worsened. He feels like his low back has been acting up again.  His left shoulder is doing really well yet.    He has had a left S1 TFESI in 2015 with Dr. Zavala.  He is not getting left sided pain that radiates from the buttocks down the left leg (anterior/lateral) to the front of the shin into the ankle. This has been bothering him for the last 5-6 weeks.  He goes to the chiropractor which helps temporarily. He has not been to the chiropractor in about 2 week.    The pain is located in the lower back area and radiates to the down to knees  .  The pain is described as aching and burning    At BEST  0/10   At WORST  10/10 on the WORST day.    On average pain is rated as 3/10.   Today the pain is rated as 0/10  Symptoms interfere with daily activity and work.   Exacerbating factors: Lifting.    Mitigating factors medications.     Current pain medications: advil   Failed Pain Medications: ibuprofen, tylenol    Pain procedures: neck epidurals in 2018 2015 - Left S1 TFESI x2 with Dr. Zavala  8/15/22 - s/p L subacromial bursa inj w/US    Interval History 9/12/2022:     Pierre Edmonds Jr. is a 47 y.o. male presents to the clinic for follow up.   Since last visit the pain has has significantly improved. He got a subacroimial bursa injection on 8/15/22 and is having 90% improvement in his pain since the injection.  He does not require any pain medications.    The pain is located in the left shoulder area and does not radiate.  The pain is described as burning    At BEST  0/10   At WORST  2/10 on the WORST day.    On average pain is rated as 2/10.   Today the pain is rated as 0/10  Symptoms interfere with daily activity.   Exacerbating factors: randomly comes.    Mitigating factors heat.     Interval History 8/8/2022:     Pierre Edmonds Jr. is a 47 y.o. male presents to the clinic for follow up.  Since last visit the pain has is unchanged.    Had MRI left shoulder:    Impression:   Small partial-thickness rotator cuff tear with tendinosis, as above.  Small labral tears, as above.   Moderate AC joint arthropathy.   Small amount of subdeltoid fluid/ bursitis.    The pain is located in the left shoulder area and radiates to the upper arm .  The pain is described as burning and sharp    At BEST  0/10   At WORST  9/10 on the WORST day.    On average pain is rated as 5/10.   Today the pain is rated as 0/10  Symptoms interfere with daily activity and sleeping.   Exacerbating factors: certain type of movement.    Mitigating factors nothing.     Initial hx:  Pierre Edmonds Jr. is a 47 y.o. male presents to the clinic for the evaluation of left shoulder pain. The pain started 6 + months ago following no inciting event and symptoms have been worsening.  He denies any trauma. He has geo doing PT for 2-3 months and got the ROM back in the shoulder, but the pain has persisted.  His pain is intermittent, and is aggravated by certain movements.  Shoulder extension seems to be what makes the pain better after a few seconds.  He gets it everyday.     Pain Description:    The pain is located in the left shoulder area and does not radiate.  At BEST  0/10   At WORST  9/10 on the  WORST day.    On average pain is rated as 5/10.   Today the pain is rated as 1/10  The pain is intermittent.  The pain is described as burning and sharp    Symptoms interfere with daily activity and sleeping.   Exacerbating factors: certain type of movement.    Mitigating factors pushing the shoulder back.   He reports 7 hours of sleep per night.    Physical Therapy/Home Exercise: No, not currently in physical therapy or home exercise program    Current Pain Medications:    - none    Failed Pain Medications:    - tylenol, ibuprofen    Pain Treatment Therapies:     Physical Therapy: completed 2-3 months ago  Chiropractor: none  Acupuncture: none  TENS unit: none  Spinal decompression: none  Joint replacement: none    Patient denies urinary incontinence, bowel incontinence, significant motor weakness and loss of sensations.  Patient denies any suicidal or homicidal ideations     report:  Reviewed and consistent with medication use as prescribed.    Imaging:   MRI cervical 04/2015:  Technique: Shadow T1, T2 and STIR as well as axial T1 and T2 weighted images were obtained through the cervical spine without contrast.     Findings: There mild Modic degenerative endplate changes at C6-C7 with loss of disk height at this level.  The vertebral bodies otherwise maintain normal height, signal intensity and alignment.  The signal intensity in the cervical spinal cord is normal.    The craniocervical junction shows no abnormalities.  Localizer images show no abnormalities.        C2-C3: No significant disk or joint pathology     C3-C4: Osteophyte formation on the left causes mild left neuroforaminal stenosis.  The right neural foramen and central canal are patent     C4-C5: No significant disk or joint pathology.     C5-C6: Diffuse disk bulge with right disk osteophyte complex causes effacement of the anterior thecal sleeve and mild to moderate right neuroforaminal stenosis.  Mild left neural foraminal stenosis is also  present.     C6-C7: Diffuse disk bulge with small osteophytes results in moderate bilateral neural foraminal stenosis and mild central canal stenosis.     C7-T1: No significant disk or joint pathology.  IMPRESSION:    Multilevel degenerative disk and joint disease which is most pronounced at C5-C6 and C6-C7.    MRI left shoulder 07/26/22:    Impression:    Small partial-thickness rotator cuff tear with tendinosis, as above.     Small labral tears, as above.     Moderate AC joint arthropathy.     Small amount of subdeltoid fluid/ bursitis.    Past Medical History:   Diagnosis Date    Atypical nevus of back excised 8/2019    severely atypical     Diabetes mellitus type II     Fever blister     Herniated lumbar intervertebral disc 2011     Past Surgical History:   Procedure Laterality Date    APPENDECTOMY      COLONOSCOPY N/A 8/19/2022    Procedure: COLONOSCOPY;  Surgeon: Vijaya Diane MD;  Location: [facility] ([address] [place] [address]);  Service: Endoscopy;  Laterality: N/A;  any md-vacc-inst portal-pm prep-clears 4 hrs prior-tb  8/16/22- Pt requesting to stay at current time due to ride , Pt ok with DR. 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   Tobacco Use    Smoking status: Every Day     Packs/day: 1.00     Types: Cigarettes    Smokeless tobacco: Never   Substance and Sexual Activity    Alcohol use: No    Drug use: Yes     Types: Marijuana     Comment: everyday    Sexual activity: Yes     Partners: Female     Family History   Problem Relation Age of Onset    Diabetes Mother     Hypertension Father     Diabetes Maternal Aunt     Diabetes Maternal Grandmother     Melanoma Neg Hx        Review of patient's allergies indicates:   Allergen Reactions    Ceclor [cefaclor] Hives       Current Outpatient Medications   Medication Sig    albuterol (PROVENTIL HFA) 90 mcg/actuation inhaler Inhale 2 puffs  "into the lungs every 6 (six) hours as needed for Wheezing. Rescue    atorvastatin (LIPITOR) 20 MG tablet Take 1 tablet (20 mg total) by mouth once daily.    blood sugar diagnostic Strp To check Blood Glucose 1 times daily, to use with insurance preferred meter    blood-glucose meter Misc To check Blood Glucose 1 times daily, to use with insurance preferred meter    lamoTRIgine (LAMICTAL) 200 MG tablet Take 200 mg by mouth 2 (two) times daily.    lancets Misc To check Blood Glucose 1 times daily, to use with insurance preferred meter    LATUDA 60 mg Tab tablet SMARTSI Tablet(s) By Mouth Every Evening    nicotine (NICODERM CQ) 21 mg/24 hr Place 1 patch on dry clean skin in morning    nicotine, polacrilex, (NICORETTE) 2 mg Gum Take 1 each (2 mg total) by mouth as needed (in place of cigarettes 8-10 times aday). Cinnamon flavor    pen needle, diabetic (BD ULTRA-FINE ARMANDO PEN NEEDLE) 32 gauge x 5/32" Ndle USE FOR INSULIN INJECTION DAILY    gabapentin (NEURONTIN) 300 MG capsule Take 1 capsule (300 mg total) by mouth 3 (three) times daily. Take 1 in the morning and two at night    insulin detemir U-100 (LEVEMIR FLEXTOUCH U-100 INSULN) 100 unit/mL (3 mL) InPn pen Inject 15 Units into the skin every evening.    methylPREDNISolone (MEDROL DOSEPACK) 4 mg tablet use as directed    sildenafiL (VIAGRA) 100 MG tablet Take 1 tablet (100 mg total) by mouth daily as needed for Erectile Dysfunction.     No current facility-administered medications for this visit.       REVIEW OF SYSTEMS:    GENERAL:  No weight loss, malaise or fevers.   HEENT:   No recent changes in vision or hearing  NECK:  Negative for lumps, no difficulty with swallowing.  RESPIRATORY:  Negative for cough, wheezing or shortness of breath, patient denies any recent URI.  CARDIOVASCULAR:  Negative for chest pain, leg swelling or palpitations.  GI:  Negative for abdominal discomfort, blood in stools or black stools or change in bowel habits.  MUSCULOSKELETAL:  See " "HPI.  SKIN:  Negative for lesions, rash, and itching.  PSYCH:  No mood disorder or recent psychosocial stressors.  Patients sleep is not disturbed secondary to pain.  HEMATOLOGY/LYMPHOLOGY:  Negative for prolonged bleeding, bruising easily or swollen nodes.  Patient is not currently taking any anti-coagulants  NEURO:   No history of headaches, syncope, paralysis, seizures or tremors.  All other reviewed and negative other than HPI.    OBJECTIVE:    /69 (BP Location: Left arm, Patient Position: Sitting)   Pulse 70   Ht 5' 9" (1.753 m)   Wt 70.1 kg (154 lb 8.7 oz)   BMI 22.82 kg/m²     PHYSICAL EXAMINATION:    GENERAL: Well appearing, in no acute distress, alert and oriented x3.  PSYCH:  Mood and affect appropriate.  SKIN: Skin color, texture, turgor normal, no rashes or lesions.  HEAD/FACE:  Normocephalic, atraumatic. Cranial nerves grossly intact.    NECK:   - Negative pain to palpation over the cervical paraspinous muscles.   - Spurling  Negative.  - Negative pain with neck flexion, extension, or lateral flexion.     CV: RRR with palpation of the radial artery.  PULM: CTAB. No evidence of respiratory difficulty, symmetric chest rise.  GI:  Soft and non-tender.    MUSKULOSKELETAL:    EXTREMITIES:   left Shoulder Exam: not done today    Back:  (+) left SLR  (+) left seated slump test  (+) femoral nerve stretch test    MUSCULOSKELETAL:  No atrophy or tone abnormalities are noted in the UE or LE.  No deformities, edema, or skin discoloration are noted on visible skin. Good capillary refill.    NEURO: Bilateral upper and lower extremity coordination and muscle stretch reflexes are physiologic and symmetric.      NEUROLOGICAL EXAM:  MENTAL STATUS: A x O x 3, good concentration, speech is fluent and goal directed  MEMORY: recent and remote are intact  CN: CN2-12 grossly intact  MOTOR: 5/5 in all muscle groups except left HF 4/5, KE 4/5 and left DF 4/5  DTRs: 2+ intact symmetric  Sensation:    -no Loss of " sensation in a left upper and right upper C-4, C-5, C-6, C-7 and C-8 bilaterally distribution.    Martini: absent on the bilateral side(s)  Clonus: absent on the bilateral side(s)    GAIT: normal.

## 2023-05-29 ENCOUNTER — TELEPHONE (OUTPATIENT)
Dept: PAIN MEDICINE | Facility: CLINIC | Age: 48
End: 2023-05-29
Payer: COMMERCIAL

## 2023-05-29 ENCOUNTER — HOSPITAL ENCOUNTER (OUTPATIENT)
Dept: RADIOLOGY | Facility: HOSPITAL | Age: 48
Discharge: HOME | End: 2023-05-29
Attending: STUDENT IN AN ORGANIZED HEALTH CARE EDUCATION/TRAINING PROGRAM
Payer: COMMERCIAL

## 2023-05-29 DIAGNOSIS — M54.16 LEFT LUMBAR RADICULOPATHY: Primary | ICD-10-CM

## 2023-05-29 DIAGNOSIS — M54.9 DORSALGIA, UNSPECIFIED: ICD-10-CM

## 2023-05-29 DIAGNOSIS — M54.16 LEFT LUMBAR RADICULOPATHY: ICD-10-CM

## 2023-05-29 PROCEDURE — 72148 MRI LUMBAR SPINE W/O DYE: CPT | Mod: 26,,, | Performed by: RADIOLOGY

## 2023-05-29 PROCEDURE — 72148 MRI LUMBAR SPINE WITHOUT CONTRAST: ICD-10-PCS | Mod: 26,,, | Performed by: RADIOLOGY

## 2023-05-29 PROCEDURE — 72148 MRI LUMBAR SPINE W/O DYE: CPT | Mod: TC

## 2023-06-07 DIAGNOSIS — N52.9 ERECTILE DYSFUNCTION, UNSPECIFIED ERECTILE DYSFUNCTION TYPE: ICD-10-CM

## 2023-06-07 RX ORDER — SILDENAFIL 100 MG/1
100 TABLET, FILM COATED ORAL DAILY PRN
Qty: 5 TABLET | Refills: 6 | Status: CANCELLED | OUTPATIENT
Start: 2023-06-07

## 2023-06-07 NOTE — TELEPHONE ENCOUNTER
Refill Routing Note   Medication(s) are not appropriate for processing by [facility] for the following reason(s):      No active prescription written by PCP:  3/7/23    OR action(s):  Defer Care Due:  Labs due    A1C due 9/3/23      Appointments  past 12m or future 3m with PCP    Date Provider   Last Visit   3/7/2023 Jose Sanchez II, MD   Next Visit   2023 Jose Sanchez II, MD   ED visits in past 90 days: 0        Note composed:5:08 PM 2023

## 2023-06-07 NOTE — TELEPHONE ENCOUNTER
Care Due:                  Date            Visit Type   Department     Provider  --------------------------------------------------------------------------------                                EP -                              PRIMARY      [facility]  Last Visit: 03-      CARE ([facility])   ALTAF Sanchez                              EP -                              PRIMARY      [facility]  Next Visit: 06-      CARE ([facility])   ALTAF Sacnhez                                                            Last  Test          Frequency    Reason                     Performed    Due Date  --------------------------------------------------------------------------------    HBA1C.......  6 months...  insulin..................  03- 09-    [facility] Embedded Care Due Messages. Reference number: [id number].   6/07/2023 4:59:12 PM CDT

## 2023-06-08 ENCOUNTER — OFFICE VISIT (OUTPATIENT)
Dept: INTERNAL MEDICINE | Facility: CLINIC | Age: 48
End: 2023-06-08
Payer: COMMERCIAL

## 2023-06-08 ENCOUNTER — LAB VISIT (OUTPATIENT)
Dept: LAB | Facility: HOSPITAL | Age: 48
End: 2023-06-08
Attending: INTERNAL MEDICINE
Payer: COMMERCIAL

## 2023-06-08 ENCOUNTER — TELEPHONE (OUTPATIENT)
Dept: PAIN MEDICINE | Facility: CLINIC | Age: 48
End: 2023-06-08
Payer: COMMERCIAL

## 2023-06-08 VITALS
DIASTOLIC BLOOD PRESSURE: 68 MMHG | HEART RATE: 67 BPM | OXYGEN SATURATION: 96 % | HEIGHT: 69 IN | WEIGHT: 154.75 LBS | SYSTOLIC BLOOD PRESSURE: 116 MMHG | BODY MASS INDEX: 22.92 KG/M2

## 2023-06-08 DIAGNOSIS — Z79.4 CONTROLLED TYPE 2 DIABETES MELLITUS WITHOUT COMPLICATION, WITH LONG-TERM CURRENT USE OF INSULIN: Primary | ICD-10-CM

## 2023-06-08 DIAGNOSIS — E11.9 CONTROLLED TYPE 2 DIABETES MELLITUS WITHOUT COMPLICATION, WITH LONG-TERM CURRENT USE OF INSULIN: ICD-10-CM

## 2023-06-08 DIAGNOSIS — Z79.4 CONTROLLED TYPE 2 DIABETES MELLITUS WITHOUT COMPLICATION, WITH LONG-TERM CURRENT USE OF INSULIN: ICD-10-CM

## 2023-06-08 DIAGNOSIS — E11.69 HYPERLIPIDEMIA ASSOCIATED WITH TYPE 2 DIABETES MELLITUS: ICD-10-CM

## 2023-06-08 DIAGNOSIS — E78.5 HYPERLIPIDEMIA ASSOCIATED WITH TYPE 2 DIABETES MELLITUS: ICD-10-CM

## 2023-06-08 DIAGNOSIS — N52.9 ERECTILE DYSFUNCTION, UNSPECIFIED ERECTILE DYSFUNCTION TYPE: ICD-10-CM

## 2023-06-08 DIAGNOSIS — E11.9 CONTROLLED TYPE 2 DIABETES MELLITUS WITHOUT COMPLICATION, WITH LONG-TERM CURRENT USE OF INSULIN: Primary | ICD-10-CM

## 2023-06-08 DIAGNOSIS — F17.200 SMOKER: ICD-10-CM

## 2023-06-08 LAB
ALBUMIN SERPL BCP-MCNC: 4.1 G/DL (ref 3.5–5.2)
ALP SERPL-CCNC: 61 U/L (ref 55–135)
ALT SERPL W/O P-5'-P-CCNC: 17 U/L (ref 10–44)
ANION GAP SERPL CALC-SCNC: 5 MMOL/L (ref 8–16)
AST SERPL-CCNC: 19 U/L (ref 10–40)
BILIRUB SERPL-MCNC: 0.3 MG/DL (ref 0.1–1)
BUN SERPL-MCNC: 11 MG/DL (ref 6–20)
CALCIUM SERPL-MCNC: 9.8 MG/DL (ref 8.7–10.5)
CHLORIDE SERPL-SCNC: 104 MMOL/L (ref 95–110)
CO2 SERPL-SCNC: 28 MMOL/L (ref 23–29)
CREAT SERPL-MCNC: 1.1 MG/DL (ref 0.5–1.4)
EST. GFR  (NO RACE VARIABLE): >60 ML/MIN/1.73 M^2
GLUCOSE SERPL-MCNC: 87 MG/DL (ref 70–110)
POTASSIUM SERPL-SCNC: 4.5 MMOL/L (ref 3.5–5.1)
PROT SERPL-MCNC: 6.9 G/DL (ref 6–8.4)
SODIUM SERPL-SCNC: 137 MMOL/L (ref 136–145)

## 2023-06-08 PROCEDURE — 3074F PR MOST RECENT SYSTOLIC BLOOD PRESSURE < 130 MM HG: ICD-10-PCS | Mod: CPTII,S$GLB,, | Performed by: INTERNAL MEDICINE

## 2023-06-08 PROCEDURE — 99999 PR PBB SHADOW E&M-EST. PATIENT-LVL IV: CPT | Mod: PBBFAC,,, | Performed by: INTERNAL MEDICINE

## 2023-06-08 PROCEDURE — 3074F SYST BP LT 130 MM HG: CPT | Mod: CPTII,S$GLB,, | Performed by: INTERNAL MEDICINE

## 2023-06-08 PROCEDURE — 3066F PR DOCUMENTATION OF TREATMENT FOR NEPHROPATHY: ICD-10-PCS | Mod: CPTII,S$GLB,, | Performed by: INTERNAL MEDICINE

## 2023-06-08 PROCEDURE — 3061F PR NEG MICROALBUMINURIA RESULT DOCUMENTED/REVIEW: ICD-10-PCS | Mod: CPTII,S$GLB,, | Performed by: INTERNAL MEDICINE

## 2023-06-08 PROCEDURE — 1160F RVW MEDS BY RX/DR IN RCRD: CPT | Mod: CPTII,S$GLB,, | Performed by: INTERNAL MEDICINE

## 2023-06-08 PROCEDURE — 80053 COMPREHEN METABOLIC PANEL: CPT | Performed by: INTERNAL MEDICINE

## 2023-06-08 PROCEDURE — 3066F NEPHROPATHY DOC TX: CPT | Mod: CPTII,S$GLB,, | Performed by: INTERNAL MEDICINE

## 2023-06-08 PROCEDURE — 1160F PR REVIEW ALL MEDS BY PRESCRIBER/CLIN PHARMACIST DOCUMENTED: ICD-10-PCS | Mod: CPTII,S$GLB,, | Performed by: INTERNAL MEDICINE

## 2023-06-08 PROCEDURE — 3008F BODY MASS INDEX DOCD: CPT | Mod: CPTII,S$GLB,, | Performed by: INTERNAL MEDICINE

## 2023-06-08 PROCEDURE — 99214 PR OFFICE/OUTPT VISIT, EST, LEVL IV, 30-39 MIN: ICD-10-PCS | Mod: S$GLB,,, | Performed by: INTERNAL MEDICINE

## 2023-06-08 PROCEDURE — 36415 COLL VENOUS BLD VENIPUNCTURE: CPT | Performed by: INTERNAL MEDICINE

## 2023-06-08 PROCEDURE — 99214 OFFICE O/P EST MOD 30 MIN: CPT | Mod: S$GLB,,, | Performed by: INTERNAL MEDICINE

## 2023-06-08 PROCEDURE — 3044F PR MOST RECENT HEMOGLOBIN A1C LEVEL <7.0%: ICD-10-PCS | Mod: CPTII,S$GLB,, | Performed by: INTERNAL MEDICINE

## 2023-06-08 PROCEDURE — 3044F HG A1C LEVEL LT 7.0%: CPT | Mod: CPTII,S$GLB,, | Performed by: INTERNAL MEDICINE

## 2023-06-08 PROCEDURE — 1159F MED LIST DOCD IN RCRD: CPT | Mod: CPTII,S$GLB,, | Performed by: INTERNAL MEDICINE

## 2023-06-08 PROCEDURE — 3008F PR BODY MASS INDEX (BMI) DOCUMENTED: ICD-10-PCS | Mod: CPTII,S$GLB,, | Performed by: INTERNAL MEDICINE

## 2023-06-08 PROCEDURE — 3078F DIAST BP <80 MM HG: CPT | Mod: CPTII,S$GLB,, | Performed by: INTERNAL MEDICINE

## 2023-06-08 PROCEDURE — 1159F PR MEDICATION LIST DOCUMENTED IN MEDICAL RECORD: ICD-10-PCS | Mod: CPTII,S$GLB,, | Performed by: INTERNAL MEDICINE

## 2023-06-08 PROCEDURE — 99999 PR PBB SHADOW E&M-EST. PATIENT-LVL IV: ICD-10-PCS | Mod: PBBFAC,,, | Performed by: INTERNAL MEDICINE

## 2023-06-08 PROCEDURE — 83036 HEMOGLOBIN GLYCOSYLATED A1C: CPT | Performed by: INTERNAL MEDICINE

## 2023-06-08 PROCEDURE — 3078F PR MOST RECENT DIASTOLIC BLOOD PRESSURE < 80 MM HG: ICD-10-PCS | Mod: CPTII,S$GLB,, | Performed by: INTERNAL MEDICINE

## 2023-06-08 PROCEDURE — 3061F NEG MICROALBUMINURIA REV: CPT | Mod: CPTII,S$GLB,, | Performed by: INTERNAL MEDICINE

## 2023-06-08 RX ORDER — SILDENAFIL 100 MG/1
100 TABLET, FILM COATED ORAL DAILY PRN
Qty: 5 TABLET | Refills: 6 | Status: SHIPPED | OUTPATIENT
Start: 2023-06-08 | End: 2024-06-07

## 2023-06-08 NOTE — TELEPHONE ENCOUNTER
Contacted pt to offer to move procedure date up from 06/16/23 to 06/09/23, pt refused move up date, unable to move up due to work.

## 2023-06-08 NOTE — PROGRESS NOTES
Subjective:       Patient ID: Pierre Edmonds Jr. is a 47 y.o. male.    Chief Complaint:   Follow-up    HPI - feels pretty well today.  Some congestion as he is getting over a cold.  He is still smoking, but now down to less than 2 packs today.  He says his blood sugars running 130-140 at home.  Labs done couple of months ago showed well-controlled cholesterol.  Needs a refill on viagra.    PMH:  DM2, insulin requiring.  controlled  Bipolar Disorder  History of multiple HNPs with pain  Cigarette smoker  IBS  GERD  Colon polyps.  Repeat colonoscopy 2025     Meds:  Reviewed and reconciled in EPIC with patient during visit today.     Review of Systems   Constitutional:  Negative for fever.   HENT:  Positive for congestion.    Respiratory:  Positive for cough.    Cardiovascular:  Negative for chest pain.   Gastrointestinal:  Negative for abdominal pain.   Genitourinary:  Negative for difficulty urinating.   Musculoskeletal:  Negative for arthralgias.   Skin:  Negative for rash.   Neurological:  Negative for headaches.   Psychiatric/Behavioral:  Negative for sleep disturbance.      Objective:      Physical Exam  Constitutional:       General: He is not in acute distress.     Appearance: He is well-developed. He is not diaphoretic.   HENT:      Head: Normocephalic and atraumatic.   Cardiovascular:      Rate and Rhythm: Normal rate and regular rhythm.      Heart sounds: Normal heart sounds. No murmur heard.    No friction rub. No gallop.   Pulmonary:      Effort: No respiratory distress.      Breath sounds: No wheezing or rales.   Chest:      Chest wall: No tenderness.   Skin:     General: Skin is warm.      Findings: No erythema.   Neurological:      Mental Status: He is alert and oriented to person, place, and time.   Psychiatric:         Thought Content: Thought content normal.       Assessment:       1. Controlled type 2 diabetes mellitus without complication, with long-term current use of insulin    2. Hyperlipidemia  associated with type 2 diabetes mellitus    3. Smoker    4. Erectile dysfunction, unspecified erectile dysfunction type        Plan:       Pierre was seen today for follow-up.    Diagnoses and all orders for this visit:    Controlled type 2 diabetes mellitus without complication, with long-term current use of insulin - has been well controlled.  Due for a1c today  -     Hemoglobin A1C; Future  -     COMPREHENSIVE METABOLIC PANEL; Future    Hyperlipidemia associated with type 2 diabetes mellitus - at goal, stay the course    Smoker - discussed smoking and importance of cessation.  Uses gum.      Erectile dysfunction, unspecified erectile dysfunction type - stable on treatment.  Needs refill  -     sildenafiL (VIAGRA) 100 MG tablet; Take 1 tablet (100 mg total) by mouth daily as needed for Erectile Dysfunction.    Rtc prn    TERENCE Sanchez MD MPH  Staff Internist

## 2023-06-09 ENCOUNTER — PATIENT MESSAGE (OUTPATIENT)
Dept: INTERNAL MEDICINE | Facility: CLINIC | Age: 48
End: 2023-06-09
Payer: COMMERCIAL

## 2023-06-09 DIAGNOSIS — E11.9 CONTROLLED TYPE 2 DIABETES MELLITUS WITHOUT COMPLICATION, WITH LONG-TERM CURRENT USE OF INSULIN: ICD-10-CM

## 2023-06-09 DIAGNOSIS — Z79.4 CONTROLLED TYPE 2 DIABETES MELLITUS WITHOUT COMPLICATION, WITH LONG-TERM CURRENT USE OF INSULIN: ICD-10-CM

## 2023-06-09 LAB
ESTIMATED AVG GLUCOSE: 131 MG/DL (ref 68–131)
HBA1C MFR BLD: 6.2 % (ref 4–5.6)

## 2023-06-09 RX ORDER — INSULIN DETEMIR 100 [IU]/ML
12 INJECTION, SOLUTION SUBCUTANEOUS NIGHTLY
Qty: 12 ML | Refills: 3 | Status: SHIPPED | OUTPATIENT
Start: 2023-06-09 | End: 2024-07-13

## 2023-06-12 ENCOUNTER — OFFICE VISIT (OUTPATIENT)
Dept: OTOLARYNGOLOGY | Facility: CLINIC | Age: 48
End: 2023-06-12
Payer: COMMERCIAL

## 2023-06-12 DIAGNOSIS — H61.22 IMPACTED CERUMEN OF LEFT EAR: ICD-10-CM

## 2023-06-12 DIAGNOSIS — L29.9 ITCHING OF EAR: Primary | ICD-10-CM

## 2023-06-12 PROCEDURE — 1160F RVW MEDS BY RX/DR IN RCRD: CPT | Mod: CPTII,S$GLB,,

## 2023-06-12 PROCEDURE — 3066F NEPHROPATHY DOC TX: CPT | Mod: CPTII,S$GLB,,

## 2023-06-12 PROCEDURE — 1159F PR MEDICATION LIST DOCUMENTED IN MEDICAL RECORD: ICD-10-PCS | Mod: CPTII,S$GLB,,

## 2023-06-12 PROCEDURE — 99203 PR OFFICE/OUTPT VISIT, NEW, LEVL III, 30-44 MIN: ICD-10-PCS | Mod: 25,S$GLB,,

## 2023-06-12 PROCEDURE — 3066F PR DOCUMENTATION OF TREATMENT FOR NEPHROPATHY: ICD-10-PCS | Mod: CPTII,S$GLB,,

## 2023-06-12 PROCEDURE — 69210 PR REMOVAL IMPACTED CERUMEN REQUIRING INSTRUMENTATION, UNILATERAL: ICD-10-PCS | Mod: S$GLB,,,

## 2023-06-12 PROCEDURE — 1159F MED LIST DOCD IN RCRD: CPT | Mod: CPTII,S$GLB,,

## 2023-06-12 PROCEDURE — 3044F PR MOST RECENT HEMOGLOBIN A1C LEVEL <7.0%: ICD-10-PCS | Mod: CPTII,S$GLB,,

## 2023-06-12 PROCEDURE — 3061F NEG MICROALBUMINURIA REV: CPT | Mod: CPTII,S$GLB,,

## 2023-06-12 PROCEDURE — 99203 OFFICE O/P NEW LOW 30 MIN: CPT | Mod: 25,S$GLB,,

## 2023-06-12 PROCEDURE — 69210 REMOVE IMPACTED EAR WAX UNI: CPT | Mod: S$GLB,,,

## 2023-06-12 PROCEDURE — 99999 PR PBB SHADOW E&M-EST. PATIENT-LVL III: CPT | Mod: PBBFAC,,,

## 2023-06-12 PROCEDURE — 3044F HG A1C LEVEL LT 7.0%: CPT | Mod: CPTII,S$GLB,,

## 2023-06-12 PROCEDURE — 3061F PR NEG MICROALBUMINURIA RESULT DOCUMENTED/REVIEW: ICD-10-PCS | Mod: CPTII,S$GLB,,

## 2023-06-12 PROCEDURE — 99999 PR PBB SHADOW E&M-EST. PATIENT-LVL III: ICD-10-PCS | Mod: PBBFAC,,,

## 2023-06-12 PROCEDURE — 1160F PR REVIEW ALL MEDS BY PRESCRIBER/CLIN PHARMACIST DOCUMENTED: ICD-10-PCS | Mod: CPTII,S$GLB,,

## 2023-06-12 RX ORDER — BETAMETHASONE VALERATE 0.1 %
LOTION (ML) TOPICAL 2 TIMES DAILY
Qty: 60 ML | Refills: 2 | Status: SHIPPED | OUTPATIENT
Start: 2023-06-12 | End: 2023-09-15

## 2023-06-12 RX ORDER — OFLOXACIN 3 MG/ML
4 SOLUTION AURICULAR (OTIC) 2 TIMES DAILY
Qty: 5 ML | Refills: 0 | Status: SHIPPED | OUTPATIENT
Start: 2023-06-12 | End: 2023-06-17

## 2023-06-12 NOTE — PROGRESS NOTES
Subjective:       Patient ID: Pierre Edmonds Jr. is a 47 y.o. male.    Chief Complaint: Cerumen Impaction    HPI  Mr. Edmonds is a 47 year old male here today with complaints of left ear cerumen impaction. He reports his left ear feels clogged, L>R. He reports bilateral, intermittent itching of ear canals. He denies any otalgia, otorrhea, tinnitus or vertigo. He denies any chronic ear infections or allergies. He denies any sinus congestion. He reports seasonal allergies. He is a smoker.   Past Medical History:   Diagnosis Date    Atypical nevus of back excised 8/2019    severely atypical     Diabetes mellitus type II     Fever blister     Herniated lumbar intervertebral disc 2011     Past Surgical History:   Procedure Laterality Date    APPENDECTOMY      COLONOSCOPY N/A 8/19/2022    Procedure: COLONOSCOPY;  Surgeon: Vijaya Diane MD;  Location: [address]);  Service: Endoscopy;  Laterality: N/A;  any md-vacc-inst portal-pm prep-clears 4 hrs prior-tb  8/16/22- Pt requesting to stay at current time due to ride , Pt ok with DR. Diane to perform scope- [id number]     Family History   Problem Relation Age of Onset    Diabetes Mother     Hypertension Father     Diabetes Maternal Aunt     Diabetes Maternal Grandmother     Melanoma Neg Hx      Social History  Social History     Tobacco Use    Smoking status: Every Day     Packs/day: 1.00     Types: Cigarettes    Smokeless tobacco: Never   Substance Use Topics    Alcohol use: No    Drug use: Yes     Types: Marijuana     Comment: everyday       Review of Systems   HENT: Negative for ear discharge, ear infection, ear pain, hearing loss, ringing in the ears, runny nose, sinus infection, sinus pressure, sore throat and stuffy nose.      Respiratory:  Negative for shortness of breath.      Neurological: Negative for dizziness and headaches.              Objective:      Physical Exam  Vitals reviewed.   Constitutional:       General: He is not in acute distress.      Appearance: Normal appearance. He is not ill-appearing.   HENT:      Head: Normocephalic and atraumatic. No right periorbital erythema or left periorbital erythema.      Jaw: No trismus, tenderness, swelling, pain on movement or malocclusion.      Salivary Glands: Right salivary gland is not diffusely enlarged or tender. Left salivary gland is not diffusely enlarged or tender.      Right Ear: Hearing, tympanic membrane, ear canal and external ear normal. No decreased hearing noted. No laceration, drainage, swelling or tenderness. No middle ear effusion. There is no impacted cerumen. No foreign body. No mastoid tenderness. No PE tube. No hemotympanum. Tympanic membrane is not injected, scarred, perforated, erythematous, retracted or bulging. Tympanic membrane has normal mobility.      Left Ear: Hearing, tympanic membrane, ear canal and external ear normal. No decreased hearing noted. No laceration, drainage, swelling or tenderness.  No middle ear effusion. There is impacted cerumen (dry/ hard cerumen in EAC. Cerumen partially removed undermicroscopy.). No foreign body. No mastoid tenderness. No PE tube. No hemotympanum. Tympanic membrane is not injected, scarred, perforated, erythematous, retracted or bulging. Tympanic membrane has normal mobility.      Ears:      Comments: Erythema noted on floor bilateral ear canal. No drainage.      Nose: No nasal deformity or signs of injury.      Right Nostril: No epistaxis.      Left Nostril: No epistaxis.      Right Turbinates: Not enlarged, swollen or pale.      Left Turbinates: Not enlarged, swollen or pale.      Right Sinus: No maxillary sinus tenderness or frontal sinus tenderness.      Left Sinus: No maxillary sinus tenderness or frontal sinus tenderness.      Mouth/Throat:      Lips: Pink.      Mouth: Mucous membranes are moist.      Dentition: No gingival swelling.      Tongue: No lesions. Tongue does not deviate from midline.      Palate: No mass and lesions.       Pharynx: Oropharynx is clear. Uvula midline. No pharyngeal swelling, oropharyngeal exudate, posterior oropharyngeal erythema or uvula swelling.      Tonsils: No tonsillar exudate or tonsillar abscesses.   Eyes:      Conjunctiva/sclera:      Right eye: Right conjunctiva is not injected.      Left eye: Left conjunctiva is not injected.   Neck:      Thyroid: No thyroid mass, thyromegaly or thyroid tenderness.      Trachea: Trachea and phonation normal.   Pulmonary:      Effort: Pulmonary effort is normal. No respiratory distress.   Musculoskeletal:      Cervical back: Normal range of motion and neck supple. No edema, erythema or rigidity. Normal range of motion.   Lymphadenopathy:      Cervical: No cervical adenopathy.   Neurological:      Mental Status: He is alert and oriented to person, place, and time.   Psychiatric:         Attention and Perception: Attention normal.         Mood and Affect: Mood normal.         Speech: Speech normal.         Behavior: Behavior normal. Behavior is cooperative.       Procedure Note:    The patient was brought to the minor procedure room and placed under the operating microscope. Using a combination of suction, curettes and cup forceps the patient's cerumen impaction was removed from left eac. The tympanic membrane was evaluated and was unremarkable. The patient tolerated the procedure well. There were no complications.  Assessment:       1. Itching of ear    2. Impacted cerumen of left ear        Plan:       Pierre was seen today for cerumen impaction.    Diagnoses and all orders for this visit:    Itching of ear  -     betamethasone valerate 0.1% (VALISONE) 0.1 % Lotn; Apply topically 2 (two) times daily. 3 drops in right ear for itching for 10 days and then stop for 10 days    Impacted cerumen of left ear  -     ofloxacin (FLOXIN) 0.3 % otic solution; Place 4 drops into the left ear 2 (two) times daily. for 5 days  Partial hard cerumen was removed form left EAC. Remaining hard  cerumen in left EAC which requires it soften.     RTC as needed or if symptoms persist.  Questions answered.

## 2023-06-14 ENCOUNTER — PATIENT MESSAGE (OUTPATIENT)
Dept: PAIN MEDICINE | Facility: CLINIC | Age: 48
End: 2023-06-14
Payer: COMMERCIAL

## 2023-06-14 ENCOUNTER — TELEPHONE (OUTPATIENT)
Dept: PAIN MEDICINE | Facility: CLINIC | Age: 48
End: 2023-06-14
Payer: COMMERCIAL

## 2023-06-14 NOTE — TELEPHONE ENCOUNTER
Dear Pierre ,    You are scheduled for a procedure with Dr. Gee Carlisle on 06/16/2023.  You will receive a phone call from the pain team 1 or 2 days before your scheduled procedure date to clarify your Pre-Procedure instructions and exact arrival time. This procedure will take place at the [facility] at the corner of [facility] and [facility].  It is in the [facility] next to [facility].  The address is:    [address] [place] [address].  [place] [id number]    After entering the building, you will proceed to the second floor where you can check in with registration.  We will ask you to arrive roughly 1 hour before your scheduled procedure time.  You should take any medications that you routinely take for blood pressure, heart medications, thyroid, cholesterol, etc.     The fasting restrictions are dependent on whether or not you are receiving sedation.  Sedation is not available for all procedures.     Your fasting instructions are as follow:  IV sedation. You should not eat for 8 hours and can only drink clear liquids (water or black coffee without cream/sugar) up until 2 hours before your scheduled time.  You CANNOT drive yourself and must have a .    If you are on blood thinners, you need to follow the anticoagulation instructions that had been discussed previously.  You should only stop the blood thinners if it was approved by your primary care physician or your cardiologist.  In the event that you are not able to stop your blood thinners, a blood thinner was not listed on your medication list, or we were not able to get clearance from your cardiologist, then the procedure may have to be postponed/canceled.     IF you were told to stop your blood thinners, this is how long you should generally hold some of the more common ones.  Remember that stopping blood thinners is only necessary for certain procedures. If you are unsure of your instructions, please call us.    Aspirin - 5 days  Plavix/Clopidogrel - 7 days  Warfarin / Coumadin - 5 days  Eliquis - 3 days  Pradaxa/Dabigatran - 4 days  Xarelto/Rivaroxaban - 3 days    If you are a diabetic, do not take your medication because you will be fasting, but bring it with you. Please plan on being here for roughly 2 hours.     Please call us if you have been sick (running fever, having any flu-like symptoms) or have been taking antibiotics in the past 2 weeks or had any outpatient procedures other than with us (colonoscopy, endoscopy, OBGYN, dental, etc.). If you have been previously COVID positive, you will need to hold off on your procedure until you are symptom free for 10 days. If you did not have any symptoms, you can have your procedure 10 days from your positive test result.      Thank you,  [facility]

## 2023-06-15 ENCOUNTER — PATIENT MESSAGE (OUTPATIENT)
Dept: PREADMISSION TESTING | Facility: HOSPITAL | Age: 48
End: 2023-06-15
Payer: COMMERCIAL

## 2023-06-16 ENCOUNTER — HOSPITAL ENCOUNTER (OUTPATIENT)
Facility: HOSPITAL | Age: 48
Discharge: HOME | End: 2023-06-16
Attending: STUDENT IN AN ORGANIZED HEALTH CARE EDUCATION/TRAINING PROGRAM | Admitting: STUDENT IN AN ORGANIZED HEALTH CARE EDUCATION/TRAINING PROGRAM
Payer: COMMERCIAL

## 2023-06-16 VITALS
SYSTOLIC BLOOD PRESSURE: 102 MMHG | WEIGHT: 155 LBS | OXYGEN SATURATION: 94 % | DIASTOLIC BLOOD PRESSURE: 64 MMHG | RESPIRATION RATE: 16 BRPM | HEIGHT: 69 IN | BODY MASS INDEX: 22.96 KG/M2 | HEART RATE: 60 BPM | TEMPERATURE: 98 F

## 2023-06-16 DIAGNOSIS — M54.16 LUMBAR RADICULOPATHY: Primary | ICD-10-CM

## 2023-06-16 LAB — GLUCOSE SERPL-MCNC: 98 MG/DL (ref 70–110)

## 2023-06-16 PROCEDURE — 82962 GLUCOSE BLOOD TEST: CPT | Performed by: STUDENT IN AN ORGANIZED HEALTH CARE EDUCATION/TRAINING PROGRAM

## 2023-06-16 PROCEDURE — 25500020 PHARM REV CODE 255: Performed by: STUDENT IN AN ORGANIZED HEALTH CARE EDUCATION/TRAINING PROGRAM

## 2023-06-16 PROCEDURE — 25000003 PHARM REV CODE 250: Performed by: STUDENT IN AN ORGANIZED HEALTH CARE EDUCATION/TRAINING PROGRAM

## 2023-06-16 PROCEDURE — 64484 NJX AA&/STRD TFRM EPI L/S EA: CPT | Mod: LT,,, | Performed by: STUDENT IN AN ORGANIZED HEALTH CARE EDUCATION/TRAINING PROGRAM

## 2023-06-16 PROCEDURE — 63600175 PHARM REV CODE 636 W HCPCS: Performed by: STUDENT IN AN ORGANIZED HEALTH CARE EDUCATION/TRAINING PROGRAM

## 2023-06-16 PROCEDURE — 64483 NJX AA&/STRD TFRM EPI L/S 1: CPT | Mod: LT,,, | Performed by: STUDENT IN AN ORGANIZED HEALTH CARE EDUCATION/TRAINING PROGRAM

## 2023-06-16 PROCEDURE — 64484 NJX AA&/STRD TFRM EPI L/S EA: CPT | Mod: LT | Performed by: STUDENT IN AN ORGANIZED HEALTH CARE EDUCATION/TRAINING PROGRAM

## 2023-06-16 PROCEDURE — 64483 PR EPIDURAL INJ, ANES/STEROID, TRANSFORAMINAL, LUMB/SACR, SNGL LEVL: ICD-10-PCS | Mod: LT,,, | Performed by: STUDENT IN AN ORGANIZED HEALTH CARE EDUCATION/TRAINING PROGRAM

## 2023-06-16 PROCEDURE — 64483 NJX AA&/STRD TFRM EPI L/S 1: CPT | Mod: LT | Performed by: STUDENT IN AN ORGANIZED HEALTH CARE EDUCATION/TRAINING PROGRAM

## 2023-06-16 PROCEDURE — 64484 PRA INJECT ANES/STEROID FORAMEN LUMBAR/SACRAL W IMG GUIDE ,EA ADD LEVEL: ICD-10-PCS | Mod: LT,,, | Performed by: STUDENT IN AN ORGANIZED HEALTH CARE EDUCATION/TRAINING PROGRAM

## 2023-06-16 RX ORDER — DEXAMETHASONE SODIUM PHOSPHATE 10 MG/ML
INJECTION INTRAMUSCULAR; INTRAVENOUS
Status: DISCONTINUED | OUTPATIENT
Start: 2023-06-16 | End: 2023-06-16 | Stop reason: HOSPADM

## 2023-06-16 RX ORDER — SODIUM CHLORIDE 9 MG/ML
500 INJECTION, SOLUTION INTRAVENOUS CONTINUOUS
Status: DISCONTINUED | OUTPATIENT
Start: 2023-06-16 | End: 2023-06-16 | Stop reason: HOSPADM

## 2023-06-16 RX ORDER — LIDOCAINE HYDROCHLORIDE 10 MG/ML
INJECTION, SOLUTION EPIDURAL; INFILTRATION; INTRACAUDAL; PERINEURAL
Status: DISCONTINUED | OUTPATIENT
Start: 2023-06-16 | End: 2023-06-16 | Stop reason: HOSPADM

## 2023-06-16 RX ORDER — MIDAZOLAM HYDROCHLORIDE 1 MG/ML
2 INJECTION INTRAMUSCULAR; INTRAVENOUS ONCE AS NEEDED
Status: COMPLETED | OUTPATIENT
Start: 2023-06-17 | End: 2023-06-16

## 2023-06-16 RX ORDER — FENTANYL CITRATE 50 UG/ML
25 INJECTION, SOLUTION INTRAMUSCULAR; INTRAVENOUS ONCE AS NEEDED
Status: COMPLETED | OUTPATIENT
Start: 2023-06-16 | End: 2023-06-16

## 2023-06-16 RX ORDER — LIDOCAINE HYDROCHLORIDE 20 MG/ML
INJECTION, SOLUTION EPIDURAL; INFILTRATION; INTRACAUDAL; PERINEURAL
Status: DISCONTINUED | OUTPATIENT
Start: 2023-06-16 | End: 2023-06-16 | Stop reason: HOSPADM

## 2023-06-16 NOTE — PLAN OF CARE
Pt AAOx3, VSS on room air.Pt tolerated procedure well. Pt denies Dizziness or N/V. IV removed with catheter tip intact. Assisted up for the first time, steady on feet. Pt Discharge instructions given and explained to patient with verbalization of understanding all instructions. Pt denies any further questions at this time. Pt ready for dc.

## 2023-06-16 NOTE — DISCHARGE SUMMARY
[facility]  Discharge Note  Short Stay    Procedure(s) (LRB):  Left L4-5, L5-S1 TFESI (Left)      OUTCOME: Patient tolerated treatment/procedure well without complication and is now ready for discharge.    DISPOSITION: Home or Self Care    FINAL DIAGNOSIS:  <principal problem not specified>    FOLLOWUP: In clinic    DISCHARGE INSTRUCTIONS:  No discharge procedures on file.     TIME SPENT ON DISCHARGE: 10 minutes

## 2023-06-16 NOTE — PATIENT INSTRUCTIONS
[facility]  [facility]  [facility] service # [phone]    POST-PROCEDURE INSTRUCTIONS:    Today you had an injection that included a steroid medications.  The steroid may or may not have been mixed with a local anesthetic when it was injected.   If the injection was in the neck, you may feel some pressure, numbness, or slight weakness in the arm after the procedure for a short period of time (this is a normal response), if this persists for longer than 1 day please contact our office or go to the emergency room.  If the injection was in the low back, you may feel some pressure, numbness, or slight weakness in the leg after the procedure for a short period of time (this is a normal response), if this persists for longer than 1 day please contact our office or go to the emergency room.  You may get side effects from the steroid.  This is not uncommon.  Symptoms include: elevated blood sugar, elevated blood pressure, headache, flushing, nausea, insomnia.  These symptoms are transient and will resolve within 1-3 days.  If symptoms last longer than this please contact our office or head to the emergency room.  Steroid medications can take anywhere from 3-14 days to take effect (rarely longer).  You may notice that your pain worsens for a short period of time after the injection, this would not be unusual due to the pressure and trauma from the needle.    If you do not have a follow up appointment scheduled, please contact my office (or the office of the physician who referred you for the procedure) to get a post-procedure follow up scheduled 2-4 weeks after the procedure.  This can be done as a virtual visit if that is more convenient for you.      What you need to do:    Keep a record of your response to the injection you had today.    How much relief did you get?   When did the relief start and how long did it last?  Were you able to decrease the use of any of your pain  medications?  Were you able to increase your level of activity?  How long did the relief last?    What to watch out for:    If you experience any of the following symptoms after your procedure, please notify the messaging service immediately (see above for contact information):   fever (increased oral temperature)   bleeding or swelling at the injection site,    drainage, rash or redness at the injection site    possible signs of infection    increased pain at the injection site   worsening of your usual pain   severe headache   new or worsening numbness    new arm and/or leg weakness, or    changes in bowel and/or bladder function: urinating or defecating on yourself and not knowing that you did it.    PLEASE FOLLOW ALL INSTRUCTIONS CAREFULLY     Do not engage in strenuous activity (e.g., lifting or pushing heavy objects or repeated bending) for 24 hours.     Do not take a bath, swim or use Jacuzzi for 24 hours after procedure. (A shower is fine).   Remove any Band-Aids when you get home.    Use cold/ice, as needed for comfort.  We recommend the use of cold therapy alternating on for 20 minutes, off for 20 minutes.    Do not apply direct heat (heating pad or heat packs) to the injection site for 24 hours.     Resume your usual medications, unless instructed otherwise by your Pain Physician.     If you are on warfarin (Coumadin) or other blood thinner, resume this medication as instructed by your prescribing Physician.    IF AT ANY POINT YOU ARE VERY CONCERNED ABOUT YOUR SYMPTOMS, PLEASE GO TO THE EMERGENCY ROOM.    If you develop worsening pain, weakness, numbness, lose bowel or bladder control (i.e., having an accident where you did not even know you had to go to the bathroom and suddenly noticed you soiled yourself), saddle anesthesia (a loss of sensation restricted to the area of the buttocks, anus and between the legs -- i.e., those parts of your body that would touch a saddle if you were sitting on one) you  need to go immediately to the emergency department for evaluation and treatment.    ----------------------------------------------------------------------------------------------------------------------------------------------------------------  If you received Sedation please read the following instructions:  POST SEDATION INSTRUCTIONS    Today you received intravenous medication (also known as sedation) that was used to help you relax and/or decrease discomfort during your procedure. This medication will be acting in your body for the next 24 hours, so you might feel a little tired or sleepy. This feeling will slowly wear off.   Common side effects associated with these medications include: drowsiness, dizziness, sleepiness, confusion, feeling excited, difficulty remembering things, lack of steadiness with walking or balance, loss of fine muscle control, slowed reflexes, difficulty focusing, and blurred vision.  Some over-the-counter and prescription medications (e.g., muscle relaxants, opioids, mood-altering medications, sedatives/hypnotics, antihistamines) can interact with the intravenous medication you received and cause an increased risk of the side effects listed above in addition to other potentially life threatening side effects. Use extreme caution if you are taking such medications, and consult with your Pain Physician or prescribing physician if you have any questions.  For the next 12-24 hours:    DO NOT--Drive a car, operate machinery or power tools   DO NOT--Drink any alcoholic beverages (not even beer), they may dangerously increase the risk of side effects.    DO NOT--Make any important legal or business decisions or sign important documents.  We advise you to have someone to assist you at home. Move slowly and carefully. Do not make sudden changes in position. Be aware of dizziness or light-headedness and move accordingly.   If you seek medical treatment within 24 hours, let the nurse or doctor  caring for you know that you have received the above medications. If you have any questions or concerns related to your sedation or treatment today please contact us.

## 2023-06-16 NOTE — OP NOTE
"PROCEDURE: left Lumbar L4-5 and L5-S1 Transforaminal Epidural Steroid Injection    Patient Name: Pierre Edmonds Jr.  MRN: [id number]    PROCEDURE DATE: 6/16/2023    INJECTION # 1    DIAGNOSIS: Lumbar Radiculopathy  CPT CODE: 05539 (INJECTION(S), ANESTHETIC AGENT(S) AND/OR STEROID; TRANSFORAMINAL EPIDURAL, WITH IMAGING GUIDANCE (FLUOROSCOPY OR CT), LUMBAR OR SACRAL, SINGLE LEVEL), 88659 (Each additional level).     POSTPROCEDURE DIAGNOSIS: Same    PHYSICIAN: Gee Carlisle DO  NEEDLE TYPE: - 22G 5" Spinal Needle  MEDICATIONS INJECTED: 6ml mixture of 1ml Dexamethasone 10mg/ml and 5ml 1% lidocaine PF split equally between each site.  CONTRAST: Omni 300    Sedation Medications - Mild Sedation with 2mg Versed and 25mcg Fentanyl    Estimated Blood Loss - <2ml  Drains: None  Specimens Removed: None  Urine Output - Not Measured  Complications: None  Outcome: Good    Informed Consent:  The patient's condition and proposed procedures, risks, and alternatives were discussed with the patient or responsible party.  The patient's / responsible party's questions were answered.   The patient / responsible party appeared to understand and chose to proceed.  Informed consent was obtained.  After obtaining written consent, an IV hep lock was placed. (See nurses notes for details).     Procedure in Detail:  The patient was taken back to the OR suite and placed in a prone position. The skin overlying the injection site was prepped and draped in an aseptic fashion. The target injection site (see above) was identified with fluoroscopy.     Procedural Pause:  A procedural pause verifying correct patient, medical record number, allergies, medications to be administered, current vital signs, and surgical site was performed immediately prior to beginning the procedure.    The skin and subcutaneous tissue overlying the target site(s) of injection for the L4-5 and L5-S1 transforaminal epidural steroid injection was/were anesthetized using " 4 mL of 1% lidocaine with a 25-gauge, 1½-inch needle.      The fluoroscopic beam was aligned to create a tunnel view.  The above noted needle was advanced parallel to the fluoroscopic beam towards the above noted foramen under fluoroscopic guidance.  The final position of the needle(s) was identified using AP and lateral views.  Paresthesias were not noted with final needle positioning.       A microbore extension tubing was attached to the needle to minimize any movement of the needle during injection or syringe change.  After negative aspiration for heme or CSF at each site(s) where the needle(s) was placed, 0.5ml of contrast dye was injected to confirm appropriate placement and that there was no vascular uptake.  Pain provocation by the injected contrast material was not noted.  Then the injectate solution described above was injected in increments.  The needle was then retracted approximately custodial and the needle track was flushed with 0.5 mL of Lidocaine 1%.  The needle(s) was then removed.     The same procedural technique outlined above was not repeated on the OPPOSITE side.    The heart rate, pulse oximetry, and blood pressure were continuously monitored throughout the procedure.  The procedure was well tolerated. He was carefully escorted to the recovery room in stable condition. Patient was monitored by RN for recovery period.  The patient will be contacted in the next few days to determine extent of relief.  Patient was given post procedure and discharge instructions to follow at home.  The patient was discharged in a stable condition.    Note Electronically Signed By:  Gee Ruiz  06/16/2023

## 2023-06-19 ENCOUNTER — OFFICE VISIT (OUTPATIENT)
Dept: OTOLARYNGOLOGY | Facility: CLINIC | Age: 48
End: 2023-06-19
Payer: COMMERCIAL

## 2023-06-19 DIAGNOSIS — H61.22 IMPACTED CERUMEN OF LEFT EAR: Primary | ICD-10-CM

## 2023-06-19 PROCEDURE — 69210 REMOVE IMPACTED EAR WAX UNI: CPT | Mod: S$GLB,,,

## 2023-06-19 PROCEDURE — 99999 PR PBB SHADOW E&M-EST. PATIENT-LVL III: ICD-10-PCS | Mod: PBBFAC,,,

## 2023-06-19 PROCEDURE — 69210 PR REMOVAL IMPACTED CERUMEN REQUIRING INSTRUMENTATION, UNILATERAL: ICD-10-PCS | Mod: S$GLB,,,

## 2023-06-19 PROCEDURE — 99499 UNLISTED E&M SERVICE: CPT | Mod: S$GLB,,,

## 2023-06-19 PROCEDURE — 99999 PR PBB SHADOW E&M-EST. PATIENT-LVL III: CPT | Mod: PBBFAC,,,

## 2023-06-19 PROCEDURE — 99499 NO LOS: ICD-10-PCS | Mod: S$GLB,,,

## 2023-06-19 NOTE — PROGRESS NOTES
Left ear cerumen impaction    Patient denies any otalgia or drainage.  He has used the ofloxacin ear drops to soften left ear cerumen.     Procedure Note:    The patient was brought to the minor procedure room and placed under the operating microscope. Using a combination of suction, curettes and cup forceps the patient's cerumen impaction was removed. The tympanic membrane was evaluated and was unremarkable. The patient tolerated the procedure well. There were no complications.      Significant cerumen removed from left EAC.    RTC in 6 months for ear cleaning.     Questions answered.

## 2023-07-17 ENCOUNTER — CLINICAL SUPPORT (OUTPATIENT)
Dept: REHABILITATION | Facility: HOSPITAL | Age: 48
End: 2023-07-17
Attending: STUDENT IN AN ORGANIZED HEALTH CARE EDUCATION/TRAINING PROGRAM
Payer: COMMERCIAL

## 2023-07-17 DIAGNOSIS — M54.16 LEFT LUMBAR RADICULOPATHY: ICD-10-CM

## 2023-07-17 DIAGNOSIS — R29.898 DECREASED STRENGTH OF TRUNK AND BACK: ICD-10-CM

## 2023-07-17 PROCEDURE — 97530 THERAPEUTIC ACTIVITIES: CPT

## 2023-07-17 PROCEDURE — 97112 NEUROMUSCULAR REEDUCATION: CPT

## 2023-07-17 PROCEDURE — 97161 PT EVAL LOW COMPLEX 20 MIN: CPT

## 2023-07-17 NOTE — PROGRESS NOTES
"[facility] Treatment      Name: Pierre Edmonds Jr.  Clinic Number: [id number]    Therapy Diagnosis:   Encounter Diagnosis   Name Primary?    Decreased strength of trunk and back Yes     Physician: Gee Carlisle,*    Visit Date: 2023    Physician Orders: PT Eval and Treat  Medical Diagnosis from Referral: M54.16 (ICD-10-CM) - Left lumbar radiculopathy  Evaluation Date: 2023  Authorization Period Expiration: 23  Plan of Care Expiration: 2023  Reassessment Due: 2023  Visit # / Visits authorized:      Time In: 3:30 PM  Time Out: 4:15 PM   Total Billable Time: 45 minutes  INSURANCE and OUTCOMES: Value Based Insurance with FOTO Outcomes 1/3     Precautions: standard and diabetes     Pattern of pain determined: 1    Subjective   Pierre reports feeling better since his recent injection and is without c/o pain currently.       Patient reports tolerating previous visit No c/o  Patient reports their pain to be 0/10 on a 0-10 scale with 0 being no pain and 10 being the worst pain imaginable.  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, does [employer]  Pt goals: "prevent flare ups"    Objective        Baseline Isometric Testing on Med X equipment: Testing administered by PT     Date of testin23  ROM 0-48 deg   Max Peak Torque 93    Min Peak Torque 33    Flex/Ext Ratio 2.8:1   % below normative data 73        Outcomes:  Initial score: 20%  Visit 6 score:  Goal: 10%      Treatment    Pierre received the treatments listed below:      Pierre received neuromuscular education  to isolate and engage spinal stabilization musculature correctly for motor control and coordination to aid in function and posture for 10 minutes on the [employer]x Machine.  Patient performed MedX dynamic exercise with emphasis on spinal muscular control using pacer throughout  active range of motion. Therapist assisted patient " in achieving optimal exertion for neural reeducation and endurance training by using the  Deep Exertion Rating scale, by instructing the patient to aim for mid range of exertion, performing 15-20 repetitions, slowly, correctly,and safely.        [facility] - Short 7/19/2023   Visit Number 2   VAS Pain Rating 0   Treadmill Time (in min.) 5   Lumbar Stretches - Slouch 10   Extension in Lying 10   Lumbar Extension - Seat Pad -   Femur Restraint -   Top Dead Center -   Counterweight -   Lumbar Flexion -   Lumbar Extension -   Lumbar Peak Torque -   Lumbar Weight 45   Repetitions 15   Rating of Perceived Exertion 3     therapeutic exercises to develop strength, endurance, ROM, flexibility, posture, and core stabilization for 35 minutes including:     LTR x 10  +Hamstring stretch w/strap 3 x 20 sec  +PPT x 10 (Cues)  + Bridging x 15  EIL x 10  Seated sciatic nerve glide x 10  +SOC x 10 (cues)      Peripheral muscle strengthening which included 1 set of 15-20 repetitions at a slow, controlled 10-13 second per rep pace focused on strengthening supporting musculature for improved body mechanics and functional mobility.  Pt and therapist focused on proper form during treatment to ensure optimal strengthening of each targeted muscle group.  Machines were utilized including torso rotation, leg press, hip abd and hip add, leg ext.  Leg curl, triceps, biceps, chest and row added visit 3     manual therapy techniques:   for 00 minutes, including:     cold pack for 5 minutes to lower back.    Home Exercises Provided and Patient Education Provided   Home exercises include:LTR, sciatic nerve glide, EIL, SOC, Bridging, HSS  Cardio program:plan for visit 5  Lifting education date:plan for visit 11  Posture/Lumbar roll: not obtained yet  Fridge Magnet Discharge handout (date given):  Equipment at home/gym membership: not currently    Education provided:   - Cues w/ex's  - MedX performance  - Precor ex performance  -Deep  exertion scale    Written Home Exercises Provided: Patient instructed to cont prior HEP. Added Hamstring stretch, bridging, SOC  Exercises were reviewed and Pierre was able to demonstrate them prior to the end of the session.  Pierre demonstrated good  understanding of the education provided.     See EMR under Patient Instructions for exercises provided prior visit and today's visit    Assessment   Pierre presents to his second healthy back visit without c/o pain. He was able to demo HEP with Min VC for form. Added SOC, Hamstring stretch, PPT and bridging this visit which he was able to perform without c/o.  Pt was able to start neuro reeducation training, strengthening, and endurance training on the lumbar MedX at 50% of max peak torque according to the initial visit isometric test. (Starting weight = 45 ft/lbs and he was able to complete 15 reps, with  RPE = 3/10.. Pt was also able to complete half of the peripheral strengthening exercises without increased discomfort and will complete the complete circuit next visit as tolerated.    Patient is making  progress towards established goals.  Pt will continue to benefit from skilled outpatient physical therapy to address the deficits stated in the impairment chart, provide pt/family education and to maximize pt's level of independence in the home and community environment.     Anticipated Barriers for therapy: none  Pt's spiritual, cultural and educational needs considered and pt agreeable to plan of care and goals as stated below:     GOALS: Pt is in agreement with the following goals.     Short term goals:  6 weeks or 10 visits   - Pt will demonstrate increased lumbar ROM by at least 6 degrees from the initial ROM value with improvements noted in functional ROM and ability to perform ADLs. Appropriate and Ongoing  - Pt will demonstrate increased MedX average isometric strength value by 20% from initial test resulting in improved ability to perform bending,  lifting, and carrying activities safely, confidently. Appropriate and Ongoing  - Pt will report a reduction in worst pain score by 1-2 points for improved tolerance for prolonged standing/walking. Appropriate and Ongoing  - Pt able to perform HEP correctly with minimal cueing or supervision from therapist to encourage independent management of symptoms. Appropriate and Ongoing     Long term goals: 10 weeks or 20 visits   - Pt will demonstrate increased lumbar ROM by at least 9 degrees from initial ROM value, resulting in improved ability to perform functional forward bending while standing and sitting. Appropriate and Ongoing  - Pt will demonstrate increased MedX average isometric strength value by 40% from initial test resulting in improved ability to perform bending, lifting, and carrying activities safely and confidently. Appropriate and Ongoing  - Pt to demonstrate ability to independently control and reduce their pain through posture positioning and mechanical movements throughout a typical day. Appropriate and Ongoing  - Pt will demonstrate reduced pain and improved functional outcomes as reported on the FOTO by reaching a limitation score of < or = 10% or less in order to demonstrate subjective improvement in pt's condition.   Appropriate and Ongoing  - Pt will demonstrate independence with the HEP at discharge. Appropriate and Ongoing    Plan   Continue with established Plan of Care towards established PT goals.     Therapist: George Mina, PTA  7/17/2023

## 2023-07-17 NOTE — PLAN OF CARE
[facility]  Physical Therapy Lumbar Evaluation      Name: Pierre Edmonds Jr.  Clinic Number: [id number]    Therapy Diagnosis: Decreased Trunk Strength  Physician: Gee Carlisle,*    Physician Orders: PT Eval and Treat  Medical Diagnosis from Referral: M54.16 (ICD-10-CM) - Left lumbar radiculopathy  Evaluation Date: 7/17/2023  Authorization Period Expiration: 12/31/23  Plan of Care Expiration: 9/25/2023  Reassessment Due: 8/17/2023  Visit # / Visits authorized: 1/20    Time In: 9  Time Out: 1030  Total Billable Time: 90 minutes  INSURANCE and OUTCOMES: Value Based Insurance with FOTO Outcomes 1/3    Precautions: standard and diabetes    Pattern of pain determined: 1    Subjective     Date of onset: 10 years  History of current condition: Pierre reports Chronic LBP  that started without incident. Initially pain was only located on the Left side of the LB, but began working down into L buttocks and down LLE .  Pt also reports flare ups increased in intensity and frequency.  Patient also reported numbness into Left foot and Left LE weakness. Pt had ENRIQUE in June and currently having no symptoms into LE and very minimal soreness in LB.   Pt reports he wants to prevent future flare ups from occurring.  Medical History:   Past Medical History:   Diagnosis Date    Atypical nevus of back excised 8/2019    severely atypical     Diabetes mellitus type II     Fever blister     Herniated lumbar intervertebral disc 2011       Surgical History:   Pierre Edmonds Jr.  has a past surgical history that includes Appendectomy; Colonoscopy (N/A, 8/19/2022); and Transforaminal epidural injection of steroid (Left, 6/16/2023).    Medications:   Pierre has a current medication list which includes the following prescription(s): albuterol, atorvastatin, betamethasone valerate 0.1%, blood sugar diagnostic, blood-glucose meter, gabapentin, levemir flextouch u100 insulin, lamotrigine, lancets,  "latuda, nicotine, nicotine (polacrilex), pen needle, diabetic, sildenafil, [DISCONTINUED] bupropion, and [DISCONTINUED] zolpidem.    Allergies:   Review of patient's allergies indicates:   Allergen Reactions    Ceclor [cefaclor] Hives        Imaging: MRI   Impression:     Degenerative changes predominantly L4-L5 and L5-S1, worst at the L4-L5 level with LEFT paracentral disc extrusion extending posterior to L5 vertebral body with mass effect upon the anterior thecal sac margins, LEFT-sided, and mild associated central canal narrowing.    Prior Therapy: no  Prior Treatment: TFESI 6/16/23/chiropractors  Social History:  lives with their spouse  Occupation: office work/computer work on lighting design, does [email or web address]  Leisure: ride rollercoasters/      Prior Level of Function: I  Current Level of Function: I, no lifting  DME owned/used: I  Gym Membership: no    Pain:  Current 0/10, worst 1/10, best 0/10   Location: left back   Description: Aching and Dull  Aggravating Factors: Lifting  Easing Factors: rest  Disturbed Sleep: no    Pattern of pain questions:  1.  Where is your pain the worst? LB  2.  Is your pain constant or intermittent? intermittent  3.  Does bending forward make your typical pain worse? no  4.  Since the start of your back pain, has there been a change in your bowel or bladder? no  5.  What can't you do now that you use to be able to do? Nothing currently    Pts goals: "prevent flare ups"    Red Flag Screening:   Cough/Sneeze Strain: (--)  Bladder/Bowel: (--)  Falls: (--)  Night pain: (--)  Unexplained weight loss: (--)  General health: good    Objective      Postural examination/scapula alignment: Rounded shoulder, Head forward, and Decreased lordosis/ decreased thoracic kyohosisi  Joint integrity: Firm end feeling  Skin integrity:WNL   Edema: None  Correction of posture: better with lumbar roll  Sitting: poor  Standing: poor    MOVEMENT LOSS - Lumbar   Norms ROM Loss Initial   Flexion Fingers touch " toes, sacral angle >/= 70 deg, uniform spinal curvature, posterior weight shift  moderate loss   Extension ASIS surpasses toes, spine of scapulae surpasses heels, uniform spinal curve moderate loss   Side glide Right  minimal loss   Side glide Left  moderate loss c/ pain   Rotation Right PT observes contralateral shoulder minimal loss   Rotation Left PT observes contralateral shoulder minimal loss     Lower Extremity Strength  Right LE  Left LE    Hip flexion: 4+/5 Hip flexion: 4+/5   Hip extension:  5/5 Hip extension: 5/5   Hip abduction: 5/5 Hip abduction: 5/5   Hip adduction:  5/5 Hip adduction:  5/5   Hip External Rotation N/T Hip External Rotation N/T   Hip Internal rotation   N/T Hip Internal rotation    Knee Flexion 5/5 Knee Flexion 4+/5   Knee Extension 5/5 Knee Extension 5/5   Ankle dorsiflexion: 5/5 Ankle dorsiflexion: 5/5   Ankle plantarflexion: 5/5 Ankle plantarflexion: 4+/5     GAIT:  Assistive Device used: none  Level of Assistance: independent  Patient displays the following gait deviations: no gait deviations observed.     Special Tests:   Test Name  Test Result   Prone Instability Test (--)   SI Joint Provocation Test (--)   Straight Leg Raise (+) L   Neural Tension Test (+) L   Crossed Straight Leg Raise (--)   Walking on toes Able   Walking on heels  Able     NEUROLOGICAL SCREENING:     Sensory deficits: Intact B LE's    Reflexes:    Left Right   Patella Tendon 2+ 2+   Achilles Tendon N/T N/T   Babinski  (--) (--)   Clonus (--) (--)     REPEATED TEST MOVEMENTS:    Baseline symptoms:  Repeated Flexion in Standing no effect   Repeated Extension in Standing no effect   Repeated Flexion in lying no effect   Repeated Extension in lying  no effect       STATIC TESTS and other movements:   Prone lie no effect   Prone lie on elbows no worse tightness   Sitting slouched  no worse   Sitting erect no effect   Standing slouched no effect   Standing erect  no effect   Lying prone in extension  no worse   Long  sitting   no worse   Sustained flexion no worse   Sustained prone using mat N/a       Baseline Isometric Testing on Med X equipment: Testing administered by PT    Date of testin23  ROM 0-48 deg   Max Peak Torque 93    Min Peak Torque 33    Flex/Ext Ratio 2.8:1   % below normative data 73     Limitation/Restriction for FOTO Lumbar Survey    Therapist reviewed FOTO scores for Pierre Edmonds JrEmmanuel on 2023.   FOTO documents entered into [employer] - see Media section.    Limitation Score: 20%  Visit 5/6 Score:   Visit 10 Score:   Discharge Score:   Goal Score: 10%       Treatment     Treatment Time In: 10  Treatment Time Out: 1030  Total Treatment time separate from Evaluation: 30 minutes        Written Home Exercises Provided: yes.    HEP AS FOLLOWS:  EIL  LTR  Seated sciatic nerve glide    Exercises were reviewed and Pierre was able to demonstrate them prior to the end of the session.  Pierre demonstrated good  understanding of the education provided.     See EMR under Patient Instructions for exercises provided 2023.        Pierre received neuromuscular education to engage spinal musculature correctly for motor control and engagement of musculature for 15 minutes including the MedX exercise component and practice and standard testing. MedX dynamic exercise and baseline isometric test performed with instructions to guide the patient safely through the testing procedure. Patient instructed to perform isometric test correctly and safely while building to an optimal force with a pain-free effort. Patient also instructed that he should feel support/pressure from MedX restraints but no pain/discomfort. Patient demonstrated appropriate understanding of information.     [facility] 2023   Visit Number 1   VAS Pain Rating 0   Lumbar Extension Seat Pad 0   Femur Restraint 5   Top Dead Center 24   Counterweight 163   Lumbar Flexion 48   Lumbar Extension 0   Lumbar Peak Torque 93   Min Torque 33   Test  Percent Below Normative Data 73   Ice - Z Lie (in min.) 10           Therapeutic Education/Activity provided for 15 minutes:   - Patient was given an [facility] Visit 1 handout which discusses the following:  - what to expect in therapy  - an overview of the program, including health coaching and wellness  - importance of spinal hygiene, proper posture, lifting mechanics, sleep quality, and nutrition/hydration   - Richard roll trialed, recommended, and purchase information was provided.  - Patient received a handout regarding anticipated muscular soreness following the isometric test and strategies for management were reviewed with patient including stretching, using ice and scheduled rest.   - Patient received verbal education on the following:   - Healthy Back program   - purpose of the isometric test  - safe progression of lumbar strengthening, wellness approach, and systemic strengthening.   - safe usage of MedX machine and testing protocols.    Pierre received cold pack for 10 minutes to LB in Z-lie.    Assessment     Pierre is a 47 y.o. male referred to [facility] with a medical diagnosis of M54.16 (ICD-10-CM) - Left lumbar radiculopathy. Pt reports several years of back pain with intense flare ups and eventual numbness and weakness in LLE.  Pt reports since his ENRIQUE injection in June he feels almost 100% better.  Pt reports very minimal pain and no LLE symptoms at present.  Upon evaluation pt was noted to have some decrease in trunk ROM, poor posture , +neural tension test on the L and was 73% below age related strength norms.  Pt also noted to have 2.8:1 flex to ext ratio.  Pt would benefit greatly from core strengthening to improve motor control, posture and prevent future flare ups.    Pain Pattern: 1       Pt prognosis is Good.     Pt will benefit from skilled outpatient Physical Therapy to address the deficits stated above and in the chart below, to provide pt/family education, and  to maximize pt's level of independence. Based on the above history and physical examination an active physical therapy program is recommended.      Plan of care discussed with patient: Yes  Pt's spiritual, cultural and educational needs considered and patient is agreeable to the plan of care and goals as stated below:     Anticipated Barriers for therapy: none    PT Evaluation Completed? Yes    Medical necessity is demonstrated by the following problem list:    History  Co-morbidities and personal factors that may impact the plan of care [x] LOW: no personal factors / co-morbidities  [] MODERATE: 1-2 personal factors / co-morbidities  [] HIGH: 3+ personal factors / co-morbidities    Moderate / High Support Documentation:   Co-morbidities affecting plan of care: n/a    Personal Factors:   no deficits     Examination  Body Structures and Functions, activity limitations and participation restrictions that may impact the plan of care [x] LOW: addressing 1-2 elements  [] MODERATE: 3+ elements  [] HIGH: 4+ elements (please support below)    Moderate / High Support Documentation:     Clinical Presentation [x] LOW: stable  [] MODERATE: Evolving  [] HIGH: Unstable     Decision Making/ Complexity Score: low         GOALS: Pt is in agreement with the following goals.    Short term goals:  6 weeks or 10 visits   - Pt will demonstrate increased lumbar ROM by at least 6 degrees from the initial ROM value with improvements noted in functional ROM and ability to perform ADLs. Appropriate and Ongoing  - Pt will demonstrate increased MedX average isometric strength value by 20% from initial test resulting in improved ability to perform bending, lifting, and carrying activities safely, confidently. Appropriate and Ongoing  - Pt will report a reduction in worst pain score by 1-2 points for improved tolerance for prolonged standing/walking. Appropriate and Ongoing  - Pt able to perform HEP correctly with minimal cueing or supervision  from therapist to encourage independent management of symptoms. Appropriate and Ongoing    Long term goals: 10 weeks or 20 visits   - Pt will demonstrate increased lumbar ROM by at least 9 degrees from initial ROM value, resulting in improved ability to perform functional forward bending while standing and sitting. Appropriate and Ongoing  - Pt will demonstrate increased MedX average isometric strength value by 40% from initial test resulting in improved ability to perform bending, lifting, and carrying activities safely and confidently. Appropriate and Ongoing  - Pt to demonstrate ability to independently control and reduce their pain through posture positioning and mechanical movements throughout a typical day. Appropriate and Ongoing  - Pt will demonstrate reduced pain and improved functional outcomes as reported on the FOTO by reaching a limitation score of < or = 10% or less in order to demonstrate subjective improvement in pt's condition.   Appropriate and Ongoing  - Pt will demonstrate independence with the HEP at discharge. Appropriate and Ongoing      Plan     Outpatient physical therapy 2x week for 10 weeks or 20 visits to include the following:   - Patient education  - Therapeutic exercise  - Manual therapy  - Performance testing   - Neuromuscular Re-education  - Therapeutic activity   - Modalities    Pt may be seen by PTA as part of the rehabilitation team.     Therapist: Xin Banda, CATALINA  7/17/2023

## 2023-07-19 ENCOUNTER — CLINICAL SUPPORT (OUTPATIENT)
Dept: REHABILITATION | Facility: HOSPITAL | Age: 48
End: 2023-07-19
Payer: COMMERCIAL

## 2023-07-19 DIAGNOSIS — R29.898 DECREASED STRENGTH OF TRUNK AND BACK: Primary | ICD-10-CM

## 2023-07-19 PROCEDURE — 97110 THERAPEUTIC EXERCISES: CPT | Mod: CQ

## 2023-07-19 PROCEDURE — 97112 NEUROMUSCULAR REEDUCATION: CPT | Mod: CQ

## 2023-08-14 DIAGNOSIS — E78.2 MIXED HYPERLIPIDEMIA: ICD-10-CM

## 2023-08-14 DIAGNOSIS — Z79.4 CONTROLLED TYPE 2 DIABETES MELLITUS WITHOUT COMPLICATION, WITH LONG-TERM CURRENT USE OF INSULIN: ICD-10-CM

## 2023-08-14 DIAGNOSIS — E11.9 CONTROLLED TYPE 2 DIABETES MELLITUS WITHOUT COMPLICATION, WITH LONG-TERM CURRENT USE OF INSULIN: ICD-10-CM

## 2023-08-14 RX ORDER — ATORVASTATIN CALCIUM 20 MG/1
20 TABLET, FILM COATED ORAL DAILY
Qty: 90 TABLET | Refills: 3 | Status: SHIPPED | OUTPATIENT
Start: 2023-08-14 | End: 2024-08-13

## 2023-08-14 NOTE — TELEPHONE ENCOUNTER
No care due was identified.  [facility] Embedded Care Due Messages. Reference number: [id number].   8/14/2023 10:44:48 AM CDT  
1-3

## 2023-08-17 ENCOUNTER — CLINICAL SUPPORT (OUTPATIENT)
Dept: REHABILITATION | Facility: HOSPITAL | Age: 48
End: 2023-08-17
Payer: COMMERCIAL

## 2023-08-17 DIAGNOSIS — R29.898 DECREASED STRENGTH OF TRUNK AND BACK: Primary | ICD-10-CM

## 2023-08-17 PROCEDURE — 97110 THERAPEUTIC EXERCISES: CPT | Mod: CQ

## 2023-08-17 PROCEDURE — 97112 NEUROMUSCULAR REEDUCATION: CPT | Mod: CQ

## 2023-08-17 NOTE — PROGRESS NOTES
"[facility]      Name: Pierre Edmonds Jr.  Clinic Number: [id number]    Therapy Diagnosis:   Encounter Diagnosis   Name Primary?    Decreased strength of trunk and back Yes     Physician: Gee Carlisle,*    Visit Date: 2023    Physician Orders: PT Eval and Treat  Medical Diagnosis from Referral: M54.16 (ICD-10-CM) - Left lumbar radiculopathy  Evaluation Date: 2023  Authorization Period Expiration: 23  Plan of Care Expiration: 2023  Reassessment Due: 2023 (A reassess of ROM and strength was performed   Visit # / Visits authorized: 3/20     Time In: 9:00 AM    Time Out:9:55 AM  Total Billable Time: 55 minutes  INSURANCE and OUTCOMES: Value Based Insurance with FOTO Outcomes 1/3     Precautions: standard and diabetes     Pattern of pain determined: 1    Subjective   Pierre reports no c/o pain currently and no problems after his first follow-up visit.      Patient reports tolerating previous visit No c/o  Patient reports their pain to be 0/10 on a 0-10 scale with 0 being no pain and 10 being the worst pain imaginable.  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, does coventions  Pt goals: "prevent flare ups"    Objective        Baseline Isometric Testing on Med X equipment: Testing administered by PT     Date of testin23  ROM 0-48 deg   Max Peak Torque 93    Min Peak Torque 33    Flex/Ext Ratio 2.8:1   % below normative data 73      MOVEMENT LOSS - Lumbar    Norms ROM Loss Initial 23   Flexion Fingers touch toes, sacral angle >/= 70 deg, uniform spinal curvature, posterior weight shift  moderate loss Min-mod loss   Extension ASIS surpasses toes, spine of scapulae surpasses heels, uniform spinal curve moderate loss Mod loss   Side glide Right   minimal loss Min loss   Side glide Left   moderate loss c/ pain Min-mod loss   Rotation Right PT observes contralateral shoulder " minimal loss Min loss   Rotation Left PT observes contralateral shoulder minimal loss Min loss      Outcomes:  Initial score: 20%  Visit 6 score:  Goal: 10%      Treatment    Pierre received the treatments listed below:      Pierre received neuromuscular education  to isolate and engage spinal stabilization musculature correctly for motor control and coordination to aid in function and posture for 10 minutes on the Medical Medx Machine.  Patient performed MedX dynamic exercise with emphasis on spinal muscular control using pacer throughout  active range of motion. Therapist assisted patient in achieving optimal exertion for neural reeducation and endurance training by using the  Deep Exertion Rating scale, by instructing the patient to aim for mid range of exertion, performing 15-20 repetitions, slowly, correctly,and safely.        [facility] - Short 8/17/2023   Visit Number 3   VAS Pain Rating 0   Treadmill Time (in min.) 5   Lumbar Stretches - Slouch 10   Extension in Lying 10   Extension in Standing 10   Lumbar Extension - Seat Pad -   Femur Restraint -   Top Dead Center -   Counterweight -   Lumbar Flexion -   Lumbar Extension -   Lumbar Peak Torque -   Lumbar Weight 45   Repetitions 20   Rating of Perceived Exertion 4       therapeutic exercises to develop strength, endurance, ROM, flexibility, posture, and core stabilization for 45 minutes including:     LTR x 10  Hamstring stretch w/strap 2 x 20 sec  PPT x 10 (Cues)  Bridging + GTB x 15  +BKFO GTB x 10  EIL x 10  Seated sciatic nerve glide x 10  SOC x 10 (cues)    +EIS x 10    Peripheral muscle strengthening which included 1 set of 15-20 repetitions at a slow, controlled 10-13 second per rep pace focused on strengthening supporting musculature for improved body mechanics and functional mobility.  Pt and therapist focused on proper form during treatment to ensure optimal strengthening of each targeted muscle group.  Machines were utilized including  torso rotation, leg press, hip abd and hip add, leg ext.  Leg curl, triceps, biceps, chest and row added visit 3     manual therapy techniques:   for 00 minutes, including:     cold pack for 5 minutes to lower back.    Home Exercises Provided and Patient Education Provided   Home exercises include:LTR, sciatic nerve glide, EIL, SOC, Bridging, HSS  Cardio program:plan for visit 5  Lifting education date:plan for visit 11  Posture/Lumbar roll: not obtained yet  Fridge Magnet Discharge handout (date given):  Equipment at home/gym membership: not currently    Education provided:   - Cues w/ex's  - MedX performance  - Precor ex performance  -Deep exertion scale    Written Home Exercises Provided: Patient instructed to cont prior HEP.   Exercises were reviewed and Pierre was able to demonstrate them prior to the end of the session.  Pierre demonstrated good  understanding of the education provided.     See EMR under Patient Instructions for exercises provided prior visit     Assessment   Pierre presents to his third healthy back visit without c/o pain.  Treatment continued with flexibility, strengthening and neuromuscular reeducation ex's.  Added GTB to bridging ex and BKFO for progressive strengthening and EIS for mobility. He was able to perform ex's with minimal cues and without increased pain. Lumbar MedX resistance was maintained at 45 ft/lbs completing 20 reps, with  RPE =4/10.. Pt was also able to complete the peripheral strengthening exercises without increased discomfort. Will look to progress per HB protocol and patient tolerance. A reassess of ROM/strength was performed by Xin Banda PT this visit.       Patient is making  progress towards established goals.  Pt will continue to benefit from skilled outpatient physical therapy to address the deficits stated in the impairment chart, provide pt/family education and to maximize pt's level of independence in the home and community environment.      Anticipated Barriers for therapy: none  Pt's spiritual, cultural and educational needs considered and pt agreeable to plan of care and goals as stated below:     GOALS: Pt is in agreement with the following goals.     Short term goals:  6 weeks or 10 visits   - Pt will demonstrate increased lumbar ROM by at least 6 degrees from the initial ROM value with improvements noted in functional ROM and ability to perform ADLs. Appropriate and Ongoing  - Pt will demonstrate increased MedX average isometric strength value by 20% from initial test resulting in improved ability to perform bending, lifting, and carrying activities safely, confidently. Appropriate and Ongoing  - Pt will report a reduction in worst pain score by 1-2 points for improved tolerance for prolonged standing/walking. Appropriate and Ongoing  - Pt able to perform HEP correctly with minimal cueing or supervision from therapist to encourage independent management of symptoms. Appropriate and Ongoing     Long term goals: 10 weeks or 20 visits   - Pt will demonstrate increased lumbar ROM by at least 9 degrees from initial ROM value, resulting in improved ability to perform functional forward bending while standing and sitting. Appropriate and Ongoing  - Pt will demonstrate increased MedX average isometric strength value by 40% from initial test resulting in improved ability to perform bending, lifting, and carrying activities safely and confidently. Appropriate and Ongoing  - Pt to demonstrate ability to independently control and reduce their pain through posture positioning and mechanical movements throughout a typical day. Appropriate and Ongoing  - Pt will demonstrate reduced pain and improved functional outcomes as reported on the FOTO by reaching a limitation score of < or = 10% or less in order to demonstrate subjective improvement in pt's condition.   Appropriate and Ongoing  - Pt will demonstrate independence with the HEP at discharge. Appropriate  and Ongoing    Plan   Continue with established Plan of Care towards established PT goals.     Therapist: George Mina, PTA  8/17/2023

## 2023-08-23 ENCOUNTER — CLINICAL SUPPORT (OUTPATIENT)
Dept: REHABILITATION | Facility: HOSPITAL | Age: 48
End: 2023-08-23
Payer: COMMERCIAL

## 2023-08-23 DIAGNOSIS — R29.898 DECREASED STRENGTH OF TRUNK AND BACK: Primary | ICD-10-CM

## 2023-08-23 PROCEDURE — 97110 THERAPEUTIC EXERCISES: CPT

## 2023-08-23 PROCEDURE — 97112 NEUROMUSCULAR REEDUCATION: CPT

## 2023-08-23 NOTE — PROGRESS NOTES
"[facility]      Name: Pierre Edmonds Jr.  Clinic Number: [id number]    Therapy Diagnosis:   Encounter Diagnosis   Name Primary?    Decreased strength of trunk and back Yes       Physician: Gee Carlisle,*    Visit Date: 2023    Physician Orders: PT Eval and Treat  Medical Diagnosis from Referral: M54.16 (ICD-10-CM) - Left lumbar radiculopathy  Evaluation Date: 2023  Authorization Period Expiration: 23  Plan of Care Expiration: 2023  Reassessment Due: 23  Visit # / Visits authorized:      Time In:10AM    Time Out:1050  Total Billable Time: 45 minutes  INSURANCE and OUTCOMES: Value Based Insurance with FOTO Outcomes 1/3     Precautions: standard and diabetes     Pattern of pain determined: 1    Subjective   Pierre reports no c/o pain currently.  Reports some DOMS after last visit which resolved within a day.    Patient reports tolerating previous visit No c/o  Patient reports their pain to be 0/10 on a 0-10 scale with 0 being no pain and 10 being the worst pain imaginable.  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, does coventions  Pt goals: "prevent flare ups"    Objective        Baseline Isometric Testing on Med X equipment: Testing administered by PT     Date of testin23  ROM 0-48 deg   Max Peak Torque 93    Min Peak Torque 33    Flex/Ext Ratio 2.8:1   % below normative data 73      MOVEMENT LOSS - Lumbar    Norms ROM Loss Initial 23   Flexion Fingers touch toes, sacral angle >/= 70 deg, uniform spinal curvature, posterior weight shift  moderate loss Min-mod loss   Extension ASIS surpasses toes, spine of scapulae surpasses heels, uniform spinal curve moderate loss Mod loss   Side glide Right   minimal loss Min loss   Side glide Left   moderate loss c/ pain Min-mod loss   Rotation Right PT observes contralateral shoulder minimal loss Min loss   Rotation Left PT " observes contralateral shoulder minimal loss Min loss      Outcomes:  Initial score: 20%  Visit 6 score:  Goal: 10%      Treatment    Pierre received the treatments listed below:      Pierre received neuromuscular education  to isolate and engage spinal stabilization musculature correctly for motor control and coordination to aid in function and posture for 10 minutes on the Medical Medx Machine.  Patient performed MedX dynamic exercise with emphasis on spinal muscular control using pacer throughout  active range of motion. Therapist assisted patient in achieving optimal exertion for neural reeducation and endurance training by using the  Deep Exertion Rating scale, by instructing the patient to aim for mid range of exertion, performing 15-20 repetitions, slowly, correctly,and safely.          8/23/2023    12:47 PM   [facility]   Visit Number 4   VAS Pain Rating 0   Treadmill Time (in min.) 5 min   Lumbar Stretches - Slouch Overcorrection 10   Extension in Lying 10   Extension in Standing 10   Lumbar Weight 50 lbs   Repetitions 20   Rating of Perceived Exertion 4   Ice - Z Lie (in min.) 5       therapeutic exercises to develop strength, endurance, ROM, flexibility, posture, and core stabilization for 40 minutes including:     LTR x 10  Hamstring stretch w/strap 2 x 30 sec  PPT x 10 (Cues)  Bridging + GTB x 20  +BKFO GTB x 15  EIL x 10  Seated sciatic nerve glide x 10  SOC x 10 (cues)    +EIS x 10    Peripheral muscle strengthening which included 1 set of 15-20 repetitions at a slow, controlled 10-13 second per rep pace focused on strengthening supporting musculature for improved body mechanics and functional mobility.  Pt and therapist focused on proper form during treatment to ensure optimal strengthening of each targeted muscle group.  Machines were utilized including torso rotation, leg press, hip abd and hip add, leg ext.  Leg curl, triceps, biceps, chest and row added visit 3     manual therapy  techniques:   for 00 minutes, including:     cold pack for 5 minutes to lower back.    Home Exercises Provided and Patient Education Provided   Home exercises include:LTR, sciatic nerve glide, EIL, SOC, Bridging, HSS  Cardio program:plan for visit 5  Lifting education date:plan for visit 11  Posture/Lumbar roll: not obtained yet  Fridge Magnet Discharge handout (date given):  Equipment at home/gym membership: not currently    Education provided:   - Cues w/ex's  - MedX performance  - Precor ex performance  -Deep exertion scale    Written Home Exercises Provided: Patient instructed to cont prior HEP.   Exercises were reviewed and Pierre was able to demonstrate them prior to the end of the session.  Pierre demonstrated good  understanding of the education provided.     See EMR under Patient Instructions for exercises provided prior visit     Assessment   Pierre presents to his third healthy back visit without c/o pain.  Treatment continued with flexibility, strengthening and neuromuscular reeducation ex's.  Added reps for bridging and BKFO's to increase intensity. He was able to perform ex's with minimal cues and without increased pain. Lumbar MedX resistance was increased to 50 ft/lbs completing 20 reps, with  RPE =4/10.. Pt was also able to complete the peripheral strengthening exercises without increased discomfort. Will look to progress per HB protocol and patient tolerance. Patient is making  progress towards established goals.  Pt will continue to benefit from skilled outpatient physical therapy to address the deficits stated in the impairment chart, provide pt/family education and to maximize pt's level of independence in the home and community environment.     Anticipated Barriers for therapy: none  Pt's spiritual, cultural and educational needs considered and pt agreeable to plan of care and goals as stated below:     GOALS: Pt is in agreement with the following goals.     Short term goals:  6 weeks or 10  visits   - Pt will demonstrate increased lumbar ROM by at least 6 degrees from the initial ROM value with improvements noted in functional ROM and ability to perform ADLs. Appropriate and Ongoing  - Pt will demonstrate increased MedX average isometric strength value by 20% from initial test resulting in improved ability to perform bending, lifting, and carrying activities safely, confidently. Appropriate and Ongoing  - Pt will report a reduction in worst pain score by 1-2 points for improved tolerance for prolonged standing/walking. Appropriate and Ongoing  - Pt able to perform HEP correctly with minimal cueing or supervision from therapist to encourage independent management of symptoms. Appropriate and Ongoing     Long term goals: 10 weeks or 20 visits   - Pt will demonstrate increased lumbar ROM by at least 9 degrees from initial ROM value, resulting in improved ability to perform functional forward bending while standing and sitting. Appropriate and Ongoing  - Pt will demonstrate increased MedX average isometric strength value by 40% from initial test resulting in improved ability to perform bending, lifting, and carrying activities safely and confidently. Appropriate and Ongoing  - Pt to demonstrate ability to independently control and reduce their pain through posture positioning and mechanical movements throughout a typical day. Appropriate and Ongoing  - Pt will demonstrate reduced pain and improved functional outcomes as reported on the FOTO by reaching a limitation score of < or = 10% or less in order to demonstrate subjective improvement in pt's condition.   Appropriate and Ongoing  - Pt will demonstrate independence with the HEP at discharge. Appropriate and Ongoing    Plan   Continue with established Plan of Care towards established PT goals.     Therapist: Xin Banda, PT  8/23/2023

## 2023-08-30 ENCOUNTER — DOCUMENTATION ONLY (OUTPATIENT)
Dept: REHABILITATION | Facility: HOSPITAL | Age: 48
End: 2023-08-30
Payer: COMMERCIAL

## 2023-08-30 ENCOUNTER — CLINICAL SUPPORT (OUTPATIENT)
Dept: REHABILITATION | Facility: HOSPITAL | Age: 48
End: 2023-08-30
Payer: COMMERCIAL

## 2023-08-30 DIAGNOSIS — R29.898 DECREASED STRENGTH OF TRUNK AND BACK: Primary | ICD-10-CM

## 2023-08-30 DIAGNOSIS — E11.9 TYPE 2 DIABETES MELLITUS WITHOUT COMPLICATION, UNSPECIFIED WHETHER LONG TERM INSULIN USE: ICD-10-CM

## 2023-08-30 PROCEDURE — 97110 THERAPEUTIC EXERCISES: CPT | Mod: CQ

## 2023-08-30 PROCEDURE — 97112 NEUROMUSCULAR REEDUCATION: CPT | Mod: CQ

## 2023-08-30 NOTE — PROGRESS NOTES
PT/PTA met face to face to discuss pt's treatment plan and progress towards established goals. Pt will be seen by a physical therapist minimally every 6th visit or every 30 days.    George Mina PTA

## 2023-08-30 NOTE — PROGRESS NOTES
"[facility]      Name: Pierre Edmonds Jr.  Clinic Number: [id number]    Therapy Diagnosis:   Encounter Diagnosis   Name Primary?    Decreased strength of trunk and back Yes       Physician: Gee Carlisle,*    Visit Date: 2023    Physician Orders: PT Eval and Treat  Medical Diagnosis from Referral: M54.16 (ICD-10-CM) - Left lumbar radiculopathy  Evaluation Date: 2023  Authorization Period Expiration: 23  Plan of Care Expiration: 2023  Reassessment Due: 23  Visit # / Visits authorized:  (FOTO next visit)     Time In:8:55 AM  Time Out: 9:50 AM  Total Billable Time:  55 minutes  INSURANCE and OUTCOMES: Value Based Insurance with FOTO Outcomes 1/3     Precautions: standard and diabetes     Pattern of pain determined: 1    Subjective   Pierre reports no c/o pain currently and has been feeling pretty good overall.    Patient reports tolerating previous visit No c/o  Patient reports their pain to be 0/10 on a 0-10 scale with 0 being no pain and 10 being the worst pain imaginable.  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, does coventions  Pt goals: "prevent flare ups"    Objective      Baseline Isometric Testing on Med X equipment: Testing administered by PT     Date of testin23  ROM 0-48 deg   Max Peak Torque 93    Min Peak Torque 33    Flex/Ext Ratio 2.8:1   % below normative data 73      MOVEMENT LOSS - Lumbar    Norms ROM Loss Initial 23   Flexion Fingers touch toes, sacral angle >/= 70 deg, uniform spinal curvature, posterior weight shift  moderate loss Min-mod loss   Extension ASIS surpasses toes, spine of scapulae surpasses heels, uniform spinal curve moderate loss Mod loss   Side glide Right   minimal loss Min loss   Side glide Left   moderate loss c/ pain Min-mod loss   Rotation Right PT observes contralateral shoulder minimal loss Min loss   Rotation Left PT " observes contralateral shoulder minimal loss Min loss      Outcomes:  Initial score: 20%  Visit 6 score:  Goal: 10%      Treatment    Pierre received the treatments listed below:      Pierre received neuromuscular education  to isolate and engage spinal stabilization musculature correctly for motor control and coordination to aid in function and posture for 10 minutes on the Medical Medx Machine.  Patient performed MedX dynamic exercise with emphasis on spinal muscular control using pacer throughout  active range of motion. Therapist assisted patient in achieving optimal exertion for neural reeducation and endurance training by using the  Deep Exertion Rating scale, by instructing the patient to aim for mid range of exertion, performing 15-20 repetitions, slowly, correctly,and safely.           8/30/2023     9:31 AM   [facility] - [facility]   Visit Number 5   VAS Pain Rating 0   Treadmill Time (in min.) 5 min   Extension in Lying 10   Extension in Standing 10   Lumbar Weight 55 lbs   Repetitions 15   Rating of Perceived Exertion 4      therapeutic exercises to develop strength, endurance, ROM, flexibility, posture, and core stabilization for 45 minutes including:     LTR x 10  Hamstring stretch w/strap 3 x 20 sec  Seated sciatic nerve glide x 10  PPT x 10   Bridging + BTB x 20  BKFO BTB x 10   +cat/cow stretch x 10  EIL x 10  +Bird dog x 10 (cues for level pelvis)  SOC x 10 (cues)-NP  EIS x 10  +Seated Thoracic ext w/1/2 foam roll x 10    Peripheral muscle strengthening which included 1 set of 15-20 repetitions at a slow, controlled 10-13 second per rep pace focused on strengthening supporting musculature for improved body mechanics and functional mobility.  Pt and therapist focused on proper form during treatment to ensure optimal strengthening of each targeted muscle group.  Machines were utilized including torso rotation, leg press, hip abd and hip add, leg ext.  Leg curl, triceps, biceps, chest and row  added visit 3     manual therapy techniques:   for 00 minutes, including: NP    cold pack for 5 minutes to lower back.    Home Exercises Provided and Patient Education Provided   Home exercises include:LTR, sciatic nerve glide, EIL, SOC, Bridging, HSS, Cat/Cow, Bird dog  Cardio program:plan for visit 5, 8/30/23 Benefits of cardio handout provided  Lifting education date:plan for visit 11  Posture/Lumbar roll: not obtained yet  Fridge Magnet Discharge handout (date given):  Equipment at home/gym membership: not currently    Education provided:   - Cues w/ex's  - 8/30/23 benefits of cardio and availability of Wellness    Written Home Exercises Provided: Patient instructed to cont prior HEP. Added Cat/cow and Bird dog 8/30/23  Exercises were reviewed and Pierre was able to demonstrate them prior to the end of the session.  Pierre demonstrated good  understanding of the education provided.     See EMR under Patient Instructions for exercises provided prior visit     Assessment   Pierre returns without c/o pain currently. Treatment continued with flexibility, strengthening and neuromuscular reeducation ex's.  He was progressed to BTB for Bridging w/band and BKFO ex for progressive strengthening. Also added Cat/cow stretch and seated thoracic ext for mobility and Bird dog ex for additional strengthening. He was able to perform all ex's including progressions without c/o. He was also educated on the benefits of cardiovascular ex to which he reports nothing formally but remains quite active throughout the day. Lumbar MedX resistance was increased to 55 ft/lbs completing  15 reps, with a RPE = 4/10.. Pt was also able to complete the peripheral strengthening exercises without increased discomfort. Will look to progress per HB protocol and patient tolerance.     Patient is making  progress towards established goals.  Pt will continue to benefit from skilled outpatient physical therapy to address the deficits stated in the  impairment chart, provide pt/family education and to maximize pt's level of independence in the home and community environment.     Anticipated Barriers for therapy: none  Pt's spiritual, cultural and educational needs considered and pt agreeable to plan of care and goals as stated below:     GOALS: Pt is in agreement with the following goals.     Short term goals:  6 weeks or 10 visits   - Pt will demonstrate increased lumbar ROM by at least 6 degrees from the initial ROM value with improvements noted in functional ROM and ability to perform ADLs. Appropriate and Ongoing  - Pt will demonstrate increased MedX average isometric strength value by 20% from initial test resulting in improved ability to perform bending, lifting, and carrying activities safely, confidently. Appropriate and Ongoing  - Pt will report a reduction in worst pain score by 1-2 points for improved tolerance for prolonged standing/walking. Appropriate and Ongoing  - Pt able to perform HEP correctly with minimal cueing or supervision from therapist to encourage independent management of symptoms. Appropriate and Ongoing     Long term goals: 10 weeks or 20 visits   - Pt will demonstrate increased lumbar ROM by at least 9 degrees from initial ROM value, resulting in improved ability to perform functional forward bending while standing and sitting. Appropriate and Ongoing  - Pt will demonstrate increased MedX average isometric strength value by 40% from initial test resulting in improved ability to perform bending, lifting, and carrying activities safely and confidently. Appropriate and Ongoing  - Pt to demonstrate ability to independently control and reduce their pain through posture positioning and mechanical movements throughout a typical day. Appropriate and Ongoing  - Pt will demonstrate reduced pain and improved functional outcomes as reported on the FOTO by reaching a limitation score of < or = 10% or less in order to demonstrate subjective  improvement in pt's condition.   Appropriate and Ongoing  - Pt will demonstrate independence with the HEP at discharge. Appropriate and Ongoing    Plan   Continue with established Plan of Care towards established PT goals.     Therapist: George Mina, PTA  8/30/2023

## 2023-09-06 ENCOUNTER — DOCUMENTATION ONLY (OUTPATIENT)
Dept: REHABILITATION | Facility: HOSPITAL | Age: 48
End: 2023-09-06
Payer: COMMERCIAL

## 2023-09-06 NOTE — PROGRESS NOTES
Patient no-showed [facility] appointment this morning 9/6/2023. Called patient and left a voicemail to inform him of his next appointment on 9/11/2023.

## 2023-09-11 ENCOUNTER — CLINICAL SUPPORT (OUTPATIENT)
Dept: REHABILITATION | Facility: HOSPITAL | Age: 48
End: 2023-09-11
Payer: COMMERCIAL

## 2023-09-11 DIAGNOSIS — R29.898 DECREASED STRENGTH OF TRUNK AND BACK: Primary | ICD-10-CM

## 2023-09-11 PROCEDURE — 97110 THERAPEUTIC EXERCISES: CPT | Mod: CQ

## 2023-09-11 PROCEDURE — 97112 NEUROMUSCULAR REEDUCATION: CPT | Mod: CQ

## 2023-09-11 NOTE — PROGRESS NOTES
"[facility]      Name: Pierre Edmonds Jr.  Clinic Number: [id number]    Therapy Diagnosis:   Encounter Diagnosis   Name Primary?    Decreased strength of trunk and back Yes       Physician: Gee Carlisle,*    Visit Date: 2023    Physician Orders: PT Eval and Treat  Medical Diagnosis from Referral: M54.16 (ICD-10-CM) - Left lumbar radiculopathy  Evaluation Date: 2023  Authorization Period Expiration: 23  Plan of Care Expiration: 2023  Reassessment Due: 23  Visit # / Visits authorized:        Time In:9:00 AM  Time Out:  9:55 AM  Total Billable Time:  55 minutes  INSURANCE and OUTCOMES: Value Based Insurance with FOTO Outcomes 2/3     Precautions: standard and diabetes     Pattern of pain determined: 1    Subjective   Pierre reports mild muscular soreness due to work over the weekend but no real back pain currently. States that he overlooked his last scheduled appt.     Patient reports tolerating previous visit No c/o  Patient reports their pain to be 0/10 on a 0-10 scale with 0 being no pain and 10 being the worst pain imaginable.  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, does coventions  Pt goals: "prevent flare ups"    Objective      Baseline Isometric Testing on Med X equipment: Testing administered by PT     Date of testin23  ROM 0-48 deg   Max Peak Torque 93    Min Peak Torque 33    Flex/Ext Ratio 2.8:1   % below normative data 73      MOVEMENT LOSS - Lumbar    Norms ROM Loss Initial 23   Flexion Fingers touch toes, sacral angle >/= 70 deg, uniform spinal curvature, posterior weight shift  moderate loss Min-mod loss   Extension ASIS surpasses toes, spine of scapulae surpasses heels, uniform spinal curve moderate loss Mod loss   Side glide Right   minimal loss Min loss   Side glide Left   moderate loss c/ pain Min-mod loss   Rotation Right PT observes " contralateral shoulder minimal loss Min loss   Rotation Left PT observes contralateral shoulder minimal loss Min loss      Outcomes:  Initial score: 17%  Visit 6 score: 6% limitation  Goal: 22%      Treatment    Pierre received the treatments listed below:      Pierre received neuromuscular education  to isolate and engage spinal stabilization musculature correctly for motor control and coordination to aid in function and posture for 10 minutes on the Medical Medx Machine.  Patient performed MedX dynamic exercise with emphasis on spinal muscular control using pacer throughout  active range of motion. Therapist assisted patient in achieving optimal exertion for neural reeducation and endurance training by using the  Deep Exertion Rating scale, by instructing the patient to aim for mid range of exertion, performing 15-20 repetitions, slowly, correctly,and safely.           9/11/2023     9:10 AM   [facility] - [facility]   Visit Number 6   VAS Pain Rating 0   Treadmill Time (in min.) 5 min   Extension in Standing 10   Lumbar Weight 55 lbs   Repetitions 15   Rating of Perceived Exertion 5       therapeutic exercises to develop strength, endurance, ROM, flexibility, posture, and core stabilization for 45 minutes including:     LTR x 10  Hamstring stretch w/strap 3 x 20 sec  Seated sciatic nerve glide x 10--NP  PPT x 10 -NP  Bridging + BTB x 20  BKFO BTB x 10   cat/cow stretch x 10  EIL x 10  Bird dog x 10 (cues for level pelvis)  SOC x 10 (cues)-NP  EIS x 10-NP (Perform next visit)  Seated Thoracic ext w/1/2 foam roll x 10--NP  + Pallof press with 15# x 15    Peripheral muscle strengthening which included 1 set of 15-20 repetitions at a slow, controlled 10-13 second per rep pace focused on strengthening supporting musculature for improved body mechanics and functional mobility.  Pt and therapist focused on proper form during treatment to ensure optimal strengthening of each targeted muscle group.  Machines were  utilized including torso rotation, leg press, hip abd and hip add, leg ext.  Leg curl, triceps, biceps, chest and row added visit 3     manual therapy techniques:   for 00 minutes, including: NP    cold pack for 5 minutes to lower back.    Home Exercises Provided and Patient Education Provided   Home exercises include:LTR, sciatic nerve glide, EIL, SOC, Bridging, HSS, Cat/Cow, Bird dog  Cardio program:plan for visit 5, 8/30/23 Benefits of cardio handout provided  Lifting education date:plan for visit 11  Posture/Lumbar roll: not obtained yet  Fridge Magnet Discharge handout (date given):  Equipment at home/gym membership: not currently    Education provided:   - Cues w/ex's  - 8/30/23 benefits of cardio and availability of Wellness    Written Home Exercises Provided: Patient instructed to cont prior HEP. Added Cat/cow and Bird dog 8/30/23  Exercises were reviewed and Pierre was able to demonstrate them prior to the end of the session.  Pierre demonstrated good  understanding of the education provided.     See EMR under Patient Instructions for exercises provided prior visit     Assessment   Pierre returns with some muscular soreness without c/o pain currently. Treatment continued with flexibility, strengthening and neuromuscular reeducation ex's.  Added Paloff press for additional strengthening. He was able to perform ex's with min cues and without increased pain. Lumbar MedX resistance was maintained at 55 ft/lbs completing 15 reps, with a RPE = 5/10.. Pt was also able to complete the peripheral strengthening exercises without increased discomfort. Will look to progress per HB protocol and patient tolerance. Updated FOTO scoring reflects improvements..     Patient is making  progress towards established goals.  Pt will continue to benefit from skilled outpatient physical therapy to address the deficits stated in the impairment chart, provide pt/family education and to maximize pt's level of independence in the  home and community environment.     Anticipated Barriers for therapy: none  Pt's spiritual, cultural and educational needs considered and pt agreeable to plan of care and goals as stated below:     GOALS: Pt is in agreement with the following goals.     Short term goals:  6 weeks or 10 visits   - Pt will demonstrate increased lumbar ROM by at least 6 degrees from the initial ROM value with improvements noted in functional ROM and ability to perform ADLs. Appropriate and Ongoing  - Pt will demonstrate increased MedX average isometric strength value by 20% from initial test resulting in improved ability to perform bending, lifting, and carrying activities safely, confidently. Appropriate and Ongoing  - Pt will report a reduction in worst pain score by 1-2 points for improved tolerance for prolonged standing/walking. Appropriate and Ongoing  - Pt able to perform HEP correctly with minimal cueing or supervision from therapist to encourage independent management of symptoms. Appropriate and Ongoing     Long term goals: 10 weeks or 20 visits   - Pt will demonstrate increased lumbar ROM by at least 9 degrees from initial ROM value, resulting in improved ability to perform functional forward bending while standing and sitting. Appropriate and Ongoing  - Pt will demonstrate increased MedX average isometric strength value by 40% from initial test resulting in improved ability to perform bending, lifting, and carrying activities safely and confidently. Appropriate and Ongoing  - Pt to demonstrate ability to independently control and reduce their pain through posture positioning and mechanical movements throughout a typical day. Appropriate and Ongoing  - Pt will demonstrate reduced pain and improved functional outcomes as reported on the FOTO by reaching a limitation score of < or = 10% or less in order to demonstrate subjective improvement in pt's condition.   Appropriate and Ongoing  - Pt will demonstrate independence with  the HEP at discharge. Appropriate and Ongoing    Plan   Continue with established Plan of Care towards established PT goals.     Therapist: George Mina, PTA  9/11/2023

## 2023-09-13 ENCOUNTER — CLINICAL SUPPORT (OUTPATIENT)
Dept: REHABILITATION | Facility: HOSPITAL | Age: 48
End: 2023-09-13
Payer: COMMERCIAL

## 2023-09-13 DIAGNOSIS — R29.898 DECREASED STRENGTH OF TRUNK AND BACK: Primary | ICD-10-CM

## 2023-09-13 PROCEDURE — 97112 NEUROMUSCULAR REEDUCATION: CPT

## 2023-09-13 PROCEDURE — 97110 THERAPEUTIC EXERCISES: CPT

## 2023-09-13 NOTE — PROGRESS NOTES
"[facility]      Name: Pierre Edmonds Jr.  Clinic Number: [id number]    Therapy Diagnosis:   Encounter Diagnosis   Name Primary?    Decreased strength of trunk and back Yes     Physician: Gee Carlisle,*    Visit Date: 2023    Physician Orders: PT Eval and Treat  Medical Diagnosis from Referral: M54.16 (ICD-10-CM) - Left lumbar radiculopathy  Evaluation Date: 2023  Authorization Period Expiration: 23  Plan of Care Expiration: 2023  Reassessment Due: 23  Visit # / Visits authorized:        Time In: 9:00 AM  Time Out: 10:00 AM  Total Billable Time:  55 minutes  INSURANCE and OUTCOMES: Value Based Insurance with FOTO Outcomes 2/3     Precautions: standard and diabetes     Pattern of pain determined: 1    Subjective   Pierre reports no c/o  back pain. He states he would like to hold PT after today due to finances.     Patient reports tolerating previous visit No c/o  Patient reports their pain to be 0/10 on a 0-10 scale with 0 being no pain and 10 being the worst pain imaginable.  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, does coventions  Pt goals: "prevent flare ups"    Objective      Baseline Isometric Testing on Med X equipment: Testing administered by PT     Date of testin23  ROM 0-48 deg   Max Peak Torque 93    Min Peak Torque 33    Flex/Ext Ratio 2.8:1   % below normative data 73       Date of testin23  ROM 0-48 deg   Max Peak Torque 100   Min Peak Torque 54   Flex/Ext Ratio 1.9:1   % below normative data 65      MOVEMENT LOSS - Lumbar    Norms ROM Loss Initial 23   Flexion Fingers touch toes, sacral angle >/= 70 deg, uniform spinal curvature, posterior weight shift  moderate loss Min-mod loss Min loss   Extension ASIS surpasses toes, spine of scapulae surpasses heels, uniform spinal curve moderate loss Mod loss Min loss   Side glide Right   " minimal loss Min loss Min loss   Side glide Left   moderate loss c/ pain Min-mod loss Min loss   Rotation Right PT observes contralateral shoulder minimal loss Min loss WFL   Rotation Left PT observes contralateral shoulder minimal loss Min loss WFL      Outcomes:  Initial score: 17%  Visit 6 score: 6% limitation  Goal: 22%      Treatment    Pierre received the treatments listed below:      Pierre received neuromuscular education  to isolate and engage spinal stabilization musculature correctly for motor control and coordination to aid in function and posture for 10 minutes on the Medical Medx Machine.  Patient performed MedX dynamic exercise with emphasis on spinal muscular control using pacer throughout  active range of motion. Therapist assisted patient in achieving optimal exertion for neural reeducation and endurance training by using the  Deep Exertion Rating scale, by instructing the patient to aim for mid range of exertion, performing 15-20 repetitions, slowly, correctly,and safely.           9/13/2023     9:09 AM   [facility]   Visit Number 7   VAS Pain Rating 0   Treadmill Time (in min.) 5 min   Lumbar Flexion 48   Lumbar Extension 0   Lumbar Peak Torque 100 ft. lbs.   Min Torque 54   Test Percent Below Normative Data 65 %   Ice - Z Lie (in min.) 5        therapeutic exercises to develop strength, endurance, ROM, flexibility, posture, and core stabilization for 45 minutes including:     LTR x 10  Hamstring stretch w/strap 3 x 20 sec  Seated sciatic nerve glide x 10--NP  PPT x 10 -NP  Bridging + BTB x 20  BKFO BTB x 10   cat/cow stretch x 10  EIL x 10  Bird dog x 10 (cues for level pelvis)  SOC x 10 (cues)-NP  EIS x 10-NP (Perform next visit)  Seated Thoracic ext w/1/2 foam roll x 10--NP  Pallof press with 15# x 15 - NP    Peripheral muscle strengthening which included 1 set of 15-20 repetitions at a slow, controlled 10-13 second per rep pace focused on strengthening supporting musculature for  improved body mechanics and functional mobility.  Pt and therapist focused on proper form during treatment to ensure optimal strengthening of each targeted muscle group.  Machines were utilized including torso rotation, leg press, hip abd and hip add, leg ext.  Leg curl, triceps, biceps, chest and row added visit 3     manual therapy techniques:   for 00 minutes, including: NP    cold pack for 5 minutes to lower back.    Home Exercises Provided and Patient Education Provided   Home exercises include:LTR, sciatic nerve glide, EIL, SOC, Bridging, HSS, Cat/Cow, Bird dog  Cardio program:plan for visit 5, 8/30/23 Benefits of cardio handout provided  Lifting education date:plan for visit 11  Posture/Lumbar roll: not obtained yet  Fridge Magnet Discharge handout (date given):  Equipment at home/gym membership: not currently    Education provided:   - Cues w/ex's  - 8/30/23 benefits of cardio and availability of Wellness    Written Home Exercises Provided: Patient instructed to cont prior HEP. Added Cat/cow and Bird dog 8/30/23  Exercises were reviewed and Pierre was able to demonstrate them prior to the end of the session.  Pierre demonstrated good  understanding of the education provided.     See EMR under Patient Instructions for exercises provided prior visit     Assessment   Pierre returns with no c/o back pain. He reports he would like to hold PT for some time after today due to finances. Treatment continued with flexibility, strengthening and neuromuscular reeducation ex's. Reviewed home exercises with fridge magnet handout provided and pt demonstrated understanding with exercises. Lumbar MedX retest performed and pt demonstrated 35% improvement in average isometric strength. Pt states he plans to return to therapy when his finances are better.        Patient is making  progress towards established goals.  Pt will continue to benefit from skilled outpatient physical therapy to address the deficits stated in the  impairment chart, provide pt/family education and to maximize pt's level of independence in the home and community environment.     Anticipated Barriers for therapy: none  Pt's spiritual, cultural and educational needs considered and pt agreeable to plan of care and goals as stated below:     GOALS: Pt is in agreement with the following goals.     Short term goals:  6 weeks or 10 visits   - Pt will demonstrate increased lumbar ROM by at least 6 degrees from the initial ROM value with improvements noted in functional ROM and ability to perform ADLs. Appropriate and Ongoing  - Pt will demonstrate increased MedX average isometric strength value by 20% from initial test resulting in improved ability to perform bending, lifting, and carrying activities safely, confidently.  MET  - Pt will report a reduction in worst pain score by 1-2 points for improved tolerance for prolonged standing/walking.  MET  - Pt able to perform HEP correctly with minimal cueing or supervision from therapist to encourage independent management of symptoms.  MET     Long term goals: 10 weeks or 20 visits   - Pt will demonstrate increased lumbar ROM by at least 9 degrees from initial ROM value, resulting in improved ability to perform functional forward bending while standing and sitting. Appropriate and Ongoing  - Pt will demonstrate increased MedX average isometric strength value by 40% from initial test resulting in improved ability to perform bending, lifting, and carrying activities safely and confidently. Appropriate and Ongoing  - Pt to demonstrate ability to independently control and reduce their pain through posture positioning and mechanical movements throughout a typical day. Appropriate and Ongoing  - Pt will demonstrate reduced pain and improved functional outcomes as reported on the FOTO by reaching a limitation score of < or = 10% or less in order to demonstrate subjective improvement in pt's condition.   Appropriate and Ongoing  -  Pt will demonstrate independence with the HEP at discharge. Appropriate and Ongoing    Plan   Continue with established Plan of Care towards established PT goals.     Therapist: Vandana Meek, PT  9/13/2023

## 2023-09-13 NOTE — PATIENT INSTRUCTIONS
"HEALTHY BACK TOOLS        KEEP YOUR SPINE FEELING FINE   HEALTHY HABITS   Do your "GO TO" stretches 2/day   Get a good night's REST   Watch your POSTURE in sitting/standing Drink PLENTY of water   Use a lumbar roll Eat LOTS of fruits & vegetables   GET UP often (walk and/or stretch) Manage your STRESS   Make your workplace IDEAL FOR YOU  Don't smoke   Lift correctly EXERCISE                           WHAT TO DO WHEN SYMPTOMS FLARE UP  Back and neck pain may occasionally flare up.  If you experience a flare   up, remember your tools. Be encouraged, by remembering that flare-ups will   usually pass.   My Tools:    ~Use your "Go To" Stretches/Positions   ~Keep Moving-pain usually gets better if you move  ~Z lie (with or without ice)  10 min several times a day until symptoms reduce  ~Slowly resume normal activities   ~Practice Deep Breathing and Relaxation techniques                                                 MY EXERCISE PLAN  GO TO STRETCHES  2/day (like brushing your teeth) STRENGTHENING  2-3 times/week CARDIO PROGRAM   3-4x week   Lie on back knees side to side x15 Bridges with blue band 3x10  Walking   Hamstring stretch with dog leash 3x30 seconds each leg Lie on back + knee fall out with blue band 2x10 each leg    Lie on stomach press up 2x10 Bird dog (on hands and knees) 2x10    Cat/cow stretch (on hands & knees) x10              "

## 2023-09-15 ENCOUNTER — LAB VISIT (OUTPATIENT)
Dept: LAB | Facility: HOSPITAL | Age: 48
End: 2023-09-15
Payer: COMMERCIAL

## 2023-09-15 ENCOUNTER — OFFICE VISIT (OUTPATIENT)
Dept: INTERNAL MEDICINE | Facility: CLINIC | Age: 48
End: 2023-09-15
Payer: COMMERCIAL

## 2023-09-15 VITALS
BODY MASS INDEX: 22.6 KG/M2 | SYSTOLIC BLOOD PRESSURE: 118 MMHG | HEIGHT: 69 IN | HEART RATE: 67 BPM | WEIGHT: 152.56 LBS | OXYGEN SATURATION: 97 % | DIASTOLIC BLOOD PRESSURE: 68 MMHG

## 2023-09-15 DIAGNOSIS — Z79.4 CONTROLLED TYPE 2 DIABETES MELLITUS WITHOUT COMPLICATION, WITH LONG-TERM CURRENT USE OF INSULIN: Primary | ICD-10-CM

## 2023-09-15 DIAGNOSIS — F33.0 MAJOR DEPRESSIVE DISORDER, RECURRENT EPISODE, MILD: ICD-10-CM

## 2023-09-15 DIAGNOSIS — R09.81 SINUS CONGESTION: ICD-10-CM

## 2023-09-15 DIAGNOSIS — R68.82 DIMINISHED LIBIDO: ICD-10-CM

## 2023-09-15 DIAGNOSIS — F17.200 SMOKER: ICD-10-CM

## 2023-09-15 DIAGNOSIS — E11.9 CONTROLLED TYPE 2 DIABETES MELLITUS WITHOUT COMPLICATION, WITH LONG-TERM CURRENT USE OF INSULIN: ICD-10-CM

## 2023-09-15 DIAGNOSIS — E11.9 CONTROLLED TYPE 2 DIABETES MELLITUS WITHOUT COMPLICATION, WITH LONG-TERM CURRENT USE OF INSULIN: Primary | ICD-10-CM

## 2023-09-15 DIAGNOSIS — Z79.4 CONTROLLED TYPE 2 DIABETES MELLITUS WITHOUT COMPLICATION, WITH LONG-TERM CURRENT USE OF INSULIN: ICD-10-CM

## 2023-09-15 DIAGNOSIS — R07.89 CHEST WALL PAIN: ICD-10-CM

## 2023-09-15 LAB
ALBUMIN SERPL BCP-MCNC: 4.3 G/DL (ref 3.5–5.2)
ALP SERPL-CCNC: 64 U/L (ref 55–135)
ALT SERPL W/O P-5'-P-CCNC: 24 U/L (ref 10–44)
ANION GAP SERPL CALC-SCNC: 6 MMOL/L (ref 8–16)
AST SERPL-CCNC: 19 U/L (ref 10–40)
BILIRUB SERPL-MCNC: 0.3 MG/DL (ref 0.1–1)
BUN SERPL-MCNC: 12 MG/DL (ref 6–20)
CALCIUM SERPL-MCNC: 9.7 MG/DL (ref 8.7–10.5)
CHLORIDE SERPL-SCNC: 104 MMOL/L (ref 95–110)
CO2 SERPL-SCNC: 28 MMOL/L (ref 23–29)
CREAT SERPL-MCNC: 1 MG/DL (ref 0.5–1.4)
EST. GFR  (NO RACE VARIABLE): >60 ML/MIN/1.73 M^2
ESTIMATED AVG GLUCOSE: 157 MG/DL (ref 68–131)
GLUCOSE SERPL-MCNC: 166 MG/DL (ref 70–110)
HBA1C MFR BLD: 7.1 % (ref 4–5.6)
POTASSIUM SERPL-SCNC: 4.8 MMOL/L (ref 3.5–5.1)
PROT SERPL-MCNC: 7.2 G/DL (ref 6–8.4)
SODIUM SERPL-SCNC: 138 MMOL/L (ref 136–145)
TESTOST SERPL-MCNC: 515 NG/DL (ref 304–1227)

## 2023-09-15 PROCEDURE — 3066F PR DOCUMENTATION OF TREATMENT FOR NEPHROPATHY: ICD-10-PCS | Mod: CPTII,S$GLB,, | Performed by: INTERNAL MEDICINE

## 2023-09-15 PROCEDURE — 3074F SYST BP LT 130 MM HG: CPT | Mod: CPTII,S$GLB,, | Performed by: INTERNAL MEDICINE

## 2023-09-15 PROCEDURE — 1160F PR REVIEW ALL MEDS BY PRESCRIBER/CLIN PHARMACIST DOCUMENTED: ICD-10-PCS | Mod: CPTII,S$GLB,, | Performed by: INTERNAL MEDICINE

## 2023-09-15 PROCEDURE — 83036 HEMOGLOBIN GLYCOSYLATED A1C: CPT | Performed by: INTERNAL MEDICINE

## 2023-09-15 PROCEDURE — 84403 ASSAY OF TOTAL TESTOSTERONE: CPT | Performed by: INTERNAL MEDICINE

## 2023-09-15 PROCEDURE — 1160F RVW MEDS BY RX/DR IN RCRD: CPT | Mod: CPTII,S$GLB,, | Performed by: INTERNAL MEDICINE

## 2023-09-15 PROCEDURE — 80053 COMPREHEN METABOLIC PANEL: CPT | Performed by: INTERNAL MEDICINE

## 2023-09-15 PROCEDURE — 3078F DIAST BP <80 MM HG: CPT | Mod: CPTII,S$GLB,, | Performed by: INTERNAL MEDICINE

## 2023-09-15 PROCEDURE — 1159F PR MEDICATION LIST DOCUMENTED IN MEDICAL RECORD: ICD-10-PCS | Mod: CPTII,S$GLB,, | Performed by: INTERNAL MEDICINE

## 2023-09-15 PROCEDURE — 3008F PR BODY MASS INDEX (BMI) DOCUMENTED: ICD-10-PCS | Mod: CPTII,S$GLB,, | Performed by: INTERNAL MEDICINE

## 2023-09-15 PROCEDURE — 3044F PR MOST RECENT HEMOGLOBIN A1C LEVEL <7.0%: ICD-10-PCS | Mod: CPTII,S$GLB,, | Performed by: INTERNAL MEDICINE

## 2023-09-15 PROCEDURE — 3078F PR MOST RECENT DIASTOLIC BLOOD PRESSURE < 80 MM HG: ICD-10-PCS | Mod: CPTII,S$GLB,, | Performed by: INTERNAL MEDICINE

## 2023-09-15 PROCEDURE — 1159F MED LIST DOCD IN RCRD: CPT | Mod: CPTII,S$GLB,, | Performed by: INTERNAL MEDICINE

## 2023-09-15 PROCEDURE — 3008F BODY MASS INDEX DOCD: CPT | Mod: CPTII,S$GLB,, | Performed by: INTERNAL MEDICINE

## 2023-09-15 PROCEDURE — 36415 COLL VENOUS BLD VENIPUNCTURE: CPT | Performed by: INTERNAL MEDICINE

## 2023-09-15 PROCEDURE — 99999 PR PBB SHADOW E&M-EST. PATIENT-LVL IV: CPT | Mod: PBBFAC,,, | Performed by: INTERNAL MEDICINE

## 2023-09-15 PROCEDURE — 3074F PR MOST RECENT SYSTOLIC BLOOD PRESSURE < 130 MM HG: ICD-10-PCS | Mod: CPTII,S$GLB,, | Performed by: INTERNAL MEDICINE

## 2023-09-15 PROCEDURE — 3061F NEG MICROALBUMINURIA REV: CPT | Mod: CPTII,S$GLB,, | Performed by: INTERNAL MEDICINE

## 2023-09-15 PROCEDURE — 99214 PR OFFICE/OUTPT VISIT, EST, LEVL IV, 30-39 MIN: ICD-10-PCS | Mod: S$GLB,,, | Performed by: INTERNAL MEDICINE

## 2023-09-15 PROCEDURE — 99214 OFFICE O/P EST MOD 30 MIN: CPT | Mod: S$GLB,,, | Performed by: INTERNAL MEDICINE

## 2023-09-15 PROCEDURE — 3066F NEPHROPATHY DOC TX: CPT | Mod: CPTII,S$GLB,, | Performed by: INTERNAL MEDICINE

## 2023-09-15 PROCEDURE — 3044F HG A1C LEVEL LT 7.0%: CPT | Mod: CPTII,S$GLB,, | Performed by: INTERNAL MEDICINE

## 2023-09-15 PROCEDURE — 99999 PR PBB SHADOW E&M-EST. PATIENT-LVL IV: ICD-10-PCS | Mod: PBBFAC,,, | Performed by: INTERNAL MEDICINE

## 2023-09-15 PROCEDURE — 3061F PR NEG MICROALBUMINURIA RESULT DOCUMENTED/REVIEW: ICD-10-PCS | Mod: CPTII,S$GLB,, | Performed by: INTERNAL MEDICINE

## 2023-09-15 NOTE — PROGRESS NOTES
Subjective:       Patient ID: Pierre Edmonds Jr. is a 48 y.o. male.    Chief Complaint:   Muscle Pain (Chest area)    HPI - he awakens with chest wall pain 3-4 days per week.  He rates this 3 to 4/10.  It has been occurring for the last 6-8 the pain is across his upper chest both on the right and left side.  Advil helps most days.  Pain does not radiate; it does not occur with exercise, and it is not in the center part of his chest.  He continues to smoke.  He is down from 2 packs to 14 cigarettes per day.  He is due for some diabetic health maintenance.  Declined flu shot.  C/o diminished libido.  No difficulty attaining erections; just not much interest in sex.    PMH:  DM2, insulin requiring.  controlled  Bipolar Disorder  History of multiple HNPs with pain  Cigarette smoker  IBS  GERD  Colon polyps.  Repeat colonoscopy 2025     Meds:  Reviewed and reconciled in EPIC with patient during visit today.     Review of Systems   Constitutional:  Negative for fever.   HENT:  Positive for congestion.    Respiratory:  Negative for shortness of breath.    Cardiovascular:  Positive for chest pain.   Gastrointestinal:  Negative for abdominal pain.   Genitourinary:  Negative for difficulty urinating.   Musculoskeletal:  Positive for arthralgias.   Skin:  Negative for rash.   Neurological:  Negative for headaches.   Psychiatric/Behavioral:  Negative for sleep disturbance.        Objective:      Physical Exam  Constitutional:       General: He is not in acute distress.     Appearance: He is well-developed. He is not diaphoretic.      Comments: Lean, tattoo'd man who smells of cigarettes   HENT:      Head: Normocephalic and atraumatic.   Cardiovascular:      Rate and Rhythm: Normal rate and regular rhythm.      Heart sounds: Normal heart sounds. No murmur heard.     No friction rub. No gallop.   Pulmonary:      Effort: No respiratory distress.      Breath sounds: No wheezing or rales.   Chest:      Chest wall: No tenderness.    Musculoskeletal:      Comments: Pain not reproducible on palpation of chest wall   Skin:     General: Skin is warm.      Findings: No erythema.   Neurological:      Mental Status: He is alert and oriented to person, place, and time.   Psychiatric:         Thought Content: Thought content normal.         Assessment:       1. Controlled type 2 diabetes mellitus without complication, with long-term current use of insulin    2. Smoker    3. Major depressive disorder, recurrent episode, mild    4. Chest wall pain    5. Sinus congestion    6. Diminished libido        Plan:       Pierre was seen today for muscle pain.    Diagnoses and all orders for this visit:    Controlled type 2 diabetes mellitus without complication, with long-term current use of insulin - has been controlled on this regimen.  Continue present care; labs today  -     Hemoglobin A1C; Future  -     Diabetic Eye Screening Photo; Future  -     COMPREHENSIVE METABOLIC PANEL; Future    Smoker - please quit.  Recommended smoking cessation clinic, but he will work on this on his own  -     Stress Echo Which stress agent will be used? Treadmill Exercise; Color Flow Doppler? No; Future    Major depressive disorder, recurrent episode, mild - mood stable.  Continue.  These meds may be causing low libido    Chest wall pain - sounds MSK to me.  Given risk factors of tobacco use, diabetes and lipids, will do stress  -     Stress Echo Which stress agent will be used? Treadmill Exercise; Color Flow Doppler? No; Future    Sinus congestion    Diminished libido - new complaint.  Thoughts are as above.  Will check testosterone as well  -     Testosterone; Future    Rtc prn, or in 6 months for diabetes    TERENCE Sanchez MD MPH  Staff Internist

## 2024-03-27 DIAGNOSIS — E11.9 TYPE 2 DIABETES MELLITUS WITHOUT COMPLICATION: ICD-10-CM

## 2024-05-13 DIAGNOSIS — Z79.4 CONTROLLED TYPE 2 DIABETES MELLITUS WITHOUT COMPLICATION, WITH LONG-TERM CURRENT USE OF INSULIN: ICD-10-CM

## 2024-05-13 DIAGNOSIS — E11.9 CONTROLLED TYPE 2 DIABETES MELLITUS WITHOUT COMPLICATION, WITH LONG-TERM CURRENT USE OF INSULIN: ICD-10-CM

## 2024-05-13 RX ORDER — PEN NEEDLE, DIABETIC 30 GX3/16"
NEEDLE, DISPOSABLE MISCELLANEOUS
Qty: 100 EACH | Refills: 3 | Status: SHIPPED | OUTPATIENT
Start: 2024-05-13

## 2024-05-13 RX ORDER — INSULIN DETEMIR 100 [IU]/ML
12 INJECTION, SOLUTION SUBCUTANEOUS NIGHTLY
Qty: 15 ML | Refills: 3 | Status: SHIPPED | OUTPATIENT
Start: 2024-05-13 | End: 2024-05-15

## 2024-05-13 NOTE — TELEPHONE ENCOUNTER
Refill Routing Note   Medication(s) are not appropriate for processing by [facility] for the following reason(s):        Required labs outdated    [facility] action(s):  Defer  Approve               Appointments  past 12m or future 3m with PCP    Date Provider   Last Visit   9/15/2023 Jose Sanchez II, MD   Next Visit   5/21/2024 Jose Sanchez II, MD   ED visits in past 90 days: 0        Note composed:2:06 PM 05/13/2024

## 2024-05-13 NOTE — TELEPHONE ENCOUNTER
No care due was identified.  Health system Embedded Care Due Messages. Reference number: [id number].   5/13/2024 5:57:23 AM CDT

## 2024-05-14 ENCOUNTER — TELEPHONE (OUTPATIENT)
Dept: INTERNAL MEDICINE | Facility: CLINIC | Age: 49
End: 2024-05-14
Payer: COMMERCIAL

## 2024-05-14 DIAGNOSIS — Z79.4 CONTROLLED TYPE 2 DIABETES MELLITUS WITHOUT COMPLICATION, WITH LONG-TERM CURRENT USE OF INSULIN: ICD-10-CM

## 2024-05-14 DIAGNOSIS — E11.9 CONTROLLED TYPE 2 DIABETES MELLITUS WITHOUT COMPLICATION, WITH LONG-TERM CURRENT USE OF INSULIN: ICD-10-CM

## 2024-05-14 RX ORDER — INSULIN DETEMIR 100 [IU]/ML
12 INJECTION, SOLUTION SUBCUTANEOUS NIGHTLY
Qty: 15 ML | Refills: 3 | Status: CANCELLED | OUTPATIENT
Start: 2024-05-14

## 2024-05-14 NOTE — TELEPHONE ENCOUNTER
No care due was identified.  Health [facility] Embedded Care Due Messages. Reference number: [id number].   5/14/2024 10:36:49 AM CDT

## 2024-05-15 RX ORDER — INSULIN GLARGINE 100 [IU]/ML
12 INJECTION, SOLUTION SUBCUTANEOUS NIGHTLY
Qty: 12 ML | Refills: 3 | Status: SHIPPED | OUTPATIENT
Start: 2024-05-15 | End: 2024-05-21

## 2024-05-15 NOTE — TELEPHONE ENCOUNTER
Please call him.  I sent in a prescription for glargine insulin instead of levemir.  It's pretty equivalent, so the same dose should be fine.    LMK if he has questions.    D

## 2024-05-16 DIAGNOSIS — E11.9 TYPE 2 DIABETES MELLITUS WITHOUT COMPLICATION: ICD-10-CM

## 2024-05-21 ENCOUNTER — LAB VISIT (OUTPATIENT)
Dept: LAB | Facility: HOSPITAL | Age: 49
End: 2024-05-21
Attending: INTERNAL MEDICINE
Payer: COMMERCIAL

## 2024-05-21 ENCOUNTER — OFFICE VISIT (OUTPATIENT)
Dept: INTERNAL MEDICINE | Facility: CLINIC | Age: 49
End: 2024-05-21
Payer: COMMERCIAL

## 2024-05-21 VITALS
BODY MASS INDEX: 22.08 KG/M2 | SYSTOLIC BLOOD PRESSURE: 106 MMHG | WEIGHT: 149.06 LBS | DIASTOLIC BLOOD PRESSURE: 62 MMHG | HEIGHT: 69 IN | HEART RATE: 65 BPM

## 2024-05-21 DIAGNOSIS — J43.9 PULMONARY EMPHYSEMA, UNSPECIFIED EMPHYSEMA TYPE: ICD-10-CM

## 2024-05-21 DIAGNOSIS — E11.69 HYPERLIPIDEMIA ASSOCIATED WITH TYPE 2 DIABETES MELLITUS: ICD-10-CM

## 2024-05-21 DIAGNOSIS — R07.9 CHEST PAIN, UNSPECIFIED TYPE: ICD-10-CM

## 2024-05-21 DIAGNOSIS — Z79.4 CONTROLLED TYPE 2 DIABETES MELLITUS WITHOUT COMPLICATION, WITH LONG-TERM CURRENT USE OF INSULIN: Primary | ICD-10-CM

## 2024-05-21 DIAGNOSIS — F17.200 SMOKER: ICD-10-CM

## 2024-05-21 DIAGNOSIS — F31.77 BIPOLAR DISORDER, IN PARTIAL REMISSION, MOST RECENT EPISODE MIXED: ICD-10-CM

## 2024-05-21 DIAGNOSIS — Z79.4 CONTROLLED TYPE 2 DIABETES MELLITUS WITHOUT COMPLICATION, WITH LONG-TERM CURRENT USE OF INSULIN: ICD-10-CM

## 2024-05-21 DIAGNOSIS — E11.9 CONTROLLED TYPE 2 DIABETES MELLITUS WITHOUT COMPLICATION, WITH LONG-TERM CURRENT USE OF INSULIN: ICD-10-CM

## 2024-05-21 DIAGNOSIS — E78.5 HYPERLIPIDEMIA ASSOCIATED WITH TYPE 2 DIABETES MELLITUS: ICD-10-CM

## 2024-05-21 DIAGNOSIS — E11.9 CONTROLLED TYPE 2 DIABETES MELLITUS WITHOUT COMPLICATION, WITH LONG-TERM CURRENT USE OF INSULIN: Primary | ICD-10-CM

## 2024-05-21 LAB
ALBUMIN SERPL BCP-MCNC: 4.1 G/DL (ref 3.5–5.2)
ALBUMIN/CREAT UR: NORMAL UG/MG (ref 0–30)
ALP SERPL-CCNC: 66 U/L (ref 55–135)
ALT SERPL W/O P-5'-P-CCNC: 19 U/L (ref 10–44)
ANION GAP SERPL CALC-SCNC: 8 MMOL/L (ref 8–16)
AST SERPL-CCNC: 17 U/L (ref 10–40)
BILIRUB SERPL-MCNC: 0.2 MG/DL (ref 0.1–1)
BUN SERPL-MCNC: 13 MG/DL (ref 6–20)
CALCIUM SERPL-MCNC: 9.6 MG/DL (ref 8.7–10.5)
CHLORIDE SERPL-SCNC: 105 MMOL/L (ref 95–110)
CHOLEST SERPL-MCNC: 160 MG/DL (ref 120–199)
CHOLEST/HDLC SERPL: 3.2 {RATIO} (ref 2–5)
CO2 SERPL-SCNC: 25 MMOL/L (ref 23–29)
CREAT SERPL-MCNC: 1.1 MG/DL (ref 0.5–1.4)
CREAT UR-MCNC: 30 MG/DL (ref 23–375)
EST. GFR  (NO RACE VARIABLE): >60 ML/MIN/1.73 M^2
ESTIMATED AVG GLUCOSE: 137 MG/DL (ref 68–131)
GLUCOSE SERPL-MCNC: 143 MG/DL (ref 70–110)
HBA1C MFR BLD: 6.4 % (ref 4–5.6)
HDLC SERPL-MCNC: 50 MG/DL (ref 40–75)
HDLC SERPL: 31.3 % (ref 20–50)
LDLC SERPL CALC-MCNC: 93.4 MG/DL (ref 63–159)
MICROALBUMIN UR DL<=1MG/L-MCNC: <5 UG/ML
NONHDLC SERPL-MCNC: 110 MG/DL
POTASSIUM SERPL-SCNC: 4.8 MMOL/L (ref 3.5–5.1)
PROT SERPL-MCNC: 6.9 G/DL (ref 6–8.4)
SODIUM SERPL-SCNC: 138 MMOL/L (ref 136–145)
TRIGL SERPL-MCNC: 83 MG/DL (ref 30–150)

## 2024-05-21 PROCEDURE — 90471 IMMUNIZATION ADMIN: CPT | Mod: S$GLB,,, | Performed by: INTERNAL MEDICINE

## 2024-05-21 PROCEDURE — 99214 OFFICE O/P EST MOD 30 MIN: CPT | Mod: 25,S$GLB,, | Performed by: INTERNAL MEDICINE

## 2024-05-21 PROCEDURE — 83036 HEMOGLOBIN GLYCOSYLATED A1C: CPT | Performed by: INTERNAL MEDICINE

## 2024-05-21 PROCEDURE — 1159F MED LIST DOCD IN RCRD: CPT | Mod: CPTII,S$GLB,, | Performed by: INTERNAL MEDICINE

## 2024-05-21 PROCEDURE — 80061 LIPID PANEL: CPT | Performed by: INTERNAL MEDICINE

## 2024-05-21 PROCEDURE — 90677 PCV20 VACCINE IM: CPT | Mod: S$GLB,,, | Performed by: INTERNAL MEDICINE

## 2024-05-21 PROCEDURE — 82043 UR ALBUMIN QUANTITATIVE: CPT | Performed by: INTERNAL MEDICINE

## 2024-05-21 PROCEDURE — 3008F BODY MASS INDEX DOCD: CPT | Mod: CPTII,S$GLB,, | Performed by: INTERNAL MEDICINE

## 2024-05-21 PROCEDURE — 3078F DIAST BP <80 MM HG: CPT | Mod: CPTII,S$GLB,, | Performed by: INTERNAL MEDICINE

## 2024-05-21 PROCEDURE — 80053 COMPREHEN METABOLIC PANEL: CPT | Performed by: INTERNAL MEDICINE

## 2024-05-21 PROCEDURE — 1160F RVW MEDS BY RX/DR IN RCRD: CPT | Mod: CPTII,S$GLB,, | Performed by: INTERNAL MEDICINE

## 2024-05-21 PROCEDURE — 99999 PR PBB SHADOW E&M-EST. PATIENT-LVL IV: CPT | Mod: PBBFAC,,, | Performed by: INTERNAL MEDICINE

## 2024-05-21 PROCEDURE — 3074F SYST BP LT 130 MM HG: CPT | Mod: CPTII,S$GLB,, | Performed by: INTERNAL MEDICINE

## 2024-05-21 PROCEDURE — 36415 COLL VENOUS BLD VENIPUNCTURE: CPT | Performed by: INTERNAL MEDICINE

## 2024-05-21 RX ORDER — INSULIN GLARGINE 100 [IU]/ML
16 INJECTION, SOLUTION SUBCUTANEOUS DAILY
Qty: 15 ML | Refills: 3 | Status: SHIPPED | OUTPATIENT
Start: 2024-05-21 | End: 2025-05-21

## 2024-05-21 NOTE — PROGRESS NOTES
Subjective:       Patient ID: Pierre Edmonds Jr. is a 48 y.o. male.    Chief Complaint:   Follow-up    HPI - Six month f/u for diabetes.  He continues to smoke cigarettes; has dyspnea that is worsening.  Sometimes with exercise.  Occasional chest pains as well.  He's due for labs.  He'd like to go back to smoking cessation to help him quit.      PMH:  DM2, insulin requiring.  controlled  Bipolar Disorder  History of multiple HNPs with pain  Cigarette smoker  IBS  GERD  Colon polyps.  Repeat colonoscopy 2025     Meds:  Reviewed and reconciled in EPIC with patient during visit today.     Review of Systems   Constitutional:  Negative for fever.   HENT:  Negative for congestion.    Respiratory:  Positive for shortness of breath.    Cardiovascular:  Positive for chest pain.   Gastrointestinal:  Negative for abdominal pain.   Genitourinary:  Negative for difficulty urinating.   Musculoskeletal:  Negative for arthralgias.   Skin:  Negative for rash.   Neurological:  Negative for headaches.   Psychiatric/Behavioral:  Negative for sleep disturbance.        Objective:      Physical Exam  Constitutional:       General: He is not in acute distress.     Appearance: He is well-developed. He is not diaphoretic.      Comments: Lean, tattoo'd man smelling of cigarettes   HENT:      Head: Normocephalic and atraumatic.   Cardiovascular:      Rate and Rhythm: Normal rate and regular rhythm.      Pulses:           Dorsalis pedis pulses are 2+ on the right side and 2+ on the left side.      Heart sounds: Normal heart sounds. No murmur heard.     No friction rub. No gallop.   Pulmonary:      Effort: No respiratory distress.      Breath sounds: No wheezing or rales.   Chest:      Chest wall: No tenderness.   Feet:      Right foot:      Protective Sensation: 4 sites tested.  4 sites sensed.      Skin integrity: No ulcer, blister or skin breakdown.      Toenail Condition: Right toenails are normal.      Left foot:      Protective Sensation:  4 sites tested.  4 sites sensed.      Skin integrity: No ulcer, blister or skin breakdown.      Toenail Condition: Left toenails are normal.   Skin:     General: Skin is warm.      Findings: No erythema.   Neurological:      Mental Status: He is alert and oriented to person, place, and time.   Psychiatric:         Thought Content: Thought content normal.         Assessment:       1. Controlled type 2 diabetes mellitus without complication, with long-term current use of insulin    2. Smoker    3. Hyperlipidemia associated with type 2 diabetes mellitus    4. Bipolar disorder, in partial remission, most recent episode mixed    5. Pulmonary emphysema, unspecified emphysema type    6. Chest pain, unspecified type        Plan:       Pierre was seen today for follow-up.    Diagnoses and all orders for this visit:    Controlled type 2 diabetes mellitus without complication, with long-term current use of insulin - has been doing well.  Shifting to glargine pens d/t shortages  -     COMPREHENSIVE METABOLIC PANEL; Future  -     Hemoglobin A1C; Future  -     Microalbumin/Creatinine Ratio, Urine  -     Diabetic Eye Screening Photo; Future  -     insulin glargine U-100, Lantus, 100 unit/mL (3 mL) SubQ InPn pen; Inject 16 Units into the skin once daily.    Smoker - continues.  Now with dyspnea.  Will ask smoking cessation to help again  -     Ambulatory referral/consult to Smoking Cessation Program; Future    Hyperlipidemia associated with type 2 diabetes mellitus - stable on statin.  Time for lipid check  -     Lipid panel; Future    Bipolar disorder, in partial remission, most recent episode mixed - in good control.  Monitor     Pulmonary emphysema, unspecified emphysema type - presumptive; new diagnosis.  Monitor; use albuterol inhaler more    Chest pain, unspecified type - atypical symptoms, but he's got too many risk factors to not work this up.  Treadmill   -     Exercise Stress - EKG; Future    Rtc prn    TERENCE Sanchez MD  [facility]  Staff Internist

## 2024-05-28 ENCOUNTER — HOSPITAL ENCOUNTER (OUTPATIENT)
Dept: CARDIOLOGY | Facility: HOSPITAL | Age: 49
Discharge: HOME | End: 2024-05-28
Attending: INTERNAL MEDICINE
Payer: COMMERCIAL

## 2024-05-28 ENCOUNTER — CLINICAL SUPPORT (OUTPATIENT)
Dept: INTERNAL MEDICINE | Facility: CLINIC | Age: 49
End: 2024-05-28
Attending: INTERNAL MEDICINE
Payer: COMMERCIAL

## 2024-05-28 VITALS — WEIGHT: 149 LBS | BODY MASS INDEX: 22.07 KG/M2 | HEIGHT: 69 IN

## 2024-05-28 DIAGNOSIS — E11.9 CONTROLLED TYPE 2 DIABETES MELLITUS WITHOUT COMPLICATION, WITH LONG-TERM CURRENT USE OF INSULIN: ICD-10-CM

## 2024-05-28 DIAGNOSIS — Z79.4 CONTROLLED TYPE 2 DIABETES MELLITUS WITHOUT COMPLICATION, WITH LONG-TERM CURRENT USE OF INSULIN: ICD-10-CM

## 2024-05-28 DIAGNOSIS — E11.3291 MILD NONPROLIFERATIVE DIABETIC RETINOPATHY OF RIGHT EYE WITHOUT MACULAR EDEMA ASSOCIATED WITH TYPE 2 DIABETES MELLITUS: Primary | ICD-10-CM

## 2024-05-28 DIAGNOSIS — R07.9 CHEST PAIN, UNSPECIFIED TYPE: ICD-10-CM

## 2024-05-28 LAB
CV STRESS BASE HR: 58 BPM
DIASTOLIC BLOOD PRESSURE: 67 MMHG
OHS CV CPX 1 MINUTE RECOVERY HEART RATE: 99 BPM
OHS CV CPX 85 PERCENT MAX PREDICTED HEART RATE MALE: 147
OHS CV CPX ESTIMATED METS: 12
OHS CV CPX MAX PREDICTED HEART RATE: 173
OHS CV CPX PATIENT IS FEMALE: 0
OHS CV CPX PATIENT IS MALE: 1
OHS CV CPX PEAK DIASTOLIC BLOOD PRESSURE: 56 MMHG
OHS CV CPX PEAK HEAR RATE: 125 BPM
OHS CV CPX PEAK RATE PRESSURE PRODUCT: NORMAL
OHS CV CPX PEAK SYSTOLIC BLOOD PRESSURE: 153 MMHG
OHS CV CPX PERCENT MAX PREDICTED HEART RATE ACHIEVED: 72
OHS CV CPX RATE PRESSURE PRODUCT PRESENTING: 6554
STRESS ECHO POST EXERCISE DUR MIN: 7 MINUTES
STRESS ECHO POST EXERCISE DUR SEC: 9 SECONDS
SYSTOLIC BLOOD PRESSURE: 113 MMHG

## 2024-05-28 PROCEDURE — 92228 IMG RTA DETC/MNTR DS PHY/QHP: CPT | Mod: 26,S$GLB,, | Performed by: OPHTHALMOLOGY

## 2024-05-28 PROCEDURE — 92228 IMG RTA DETC/MNTR DS PHY/QHP: CPT | Mod: TC,S$GLB,, | Performed by: INTERNAL MEDICINE

## 2024-05-28 PROCEDURE — 2024F 7 FLD RTA PHOTO EVC RTNOPTHY: CPT | Mod: CPTII,S$GLB,, | Performed by: OPHTHALMOLOGY

## 2024-05-28 PROCEDURE — 93017 CV STRESS TEST TRACING ONLY: CPT

## 2024-05-28 PROCEDURE — 93016 CV STRESS TEST SUPVJ ONLY: CPT | Mod: ,,, | Performed by: INTERNAL MEDICINE

## 2024-05-28 PROCEDURE — 93018 CV STRESS TEST I&R ONLY: CPT | Mod: ,,, | Performed by: INTERNAL MEDICINE

## 2024-05-28 NOTE — LETTER
AUTHORIZATION FOR RELEASE OF   CONFIDENTIAL INFORMATION    Dear LocalBanya,    We are seeing Pierre Edmonds Jr., date of birth 1975, in the clinic at [facility] INTERNAL MEDICINE. Jose Sanchez II, MD is the patient's PCP. Pierre Edmonds Jr. has an outstanding lab/procedure at the time we reviewed his chart. In order to help keep his health information updated, he has authorized us to request the following medical record(s):        (  )  MAMMOGRAM                                      (  )  COLONOSCOPY      (  )  PAP SMEAR                                          (  )  OUTSIDE LAB RESULTS     (  )  DEXA SCAN                                          ( X )  EYE EXAM            (  )  FOOT EXAM                                          (  )  ENTIRE RECORD     (  )  OUTSIDE IMMUNIZATIONS                 (  )  _______________         Please fax records to [facility], [place], Gerald D. II, MD, [phone]     If you have any questions, please contact Hui at [phone].           Patient Name: Pierre Edmonds Jr.  : 1975  Patient Phone #: [phone]

## 2024-05-28 NOTE — PROGRESS NOTES
Pierre Edmonds JrEmmanuel is a 48 y.o. male here for a diabetic eye screening with non-dilated fundus photos per Dr. Sanchez.    Patient cooperative?: Yes  Small pupils?: Yes  Last eye exam: this year vision works requested exam report via fax.    For exam results, see Encounter Report.  Hui Delacruz RN HC

## 2024-05-29 PROBLEM — E11.3291 MILD NONPROLIFERATIVE DIABETIC RETINOPATHY OF RIGHT EYE WITHOUT MACULAR EDEMA ASSOCIATED WITH TYPE 2 DIABETES MELLITUS: Status: ACTIVE | Noted: 2024-05-29

## 2024-06-03 ENCOUNTER — CLINICAL SUPPORT (OUTPATIENT)
Dept: SMOKING CESSATION | Facility: CLINIC | Age: 49
End: 2024-06-03
Payer: COMMERCIAL

## 2024-06-03 DIAGNOSIS — F17.200 NICOTINE DEPENDENCE: Primary | ICD-10-CM

## 2024-06-03 DIAGNOSIS — R07.89 CHEST WALL PAIN: ICD-10-CM

## 2024-06-03 DIAGNOSIS — F17.200 SMOKER: ICD-10-CM

## 2024-06-03 PROCEDURE — 99999 PR PBB SHADOW E&M-EST. PATIENT-LVL III: CPT | Mod: PBBFAC,,, | Performed by: DIETITIAN, REGISTERED

## 2024-06-03 PROCEDURE — 99404 PREV MED CNSL INDIV APPRX 60: CPT | Mod: S$GLB,,, | Performed by: DIETITIAN, REGISTERED

## 2024-06-03 RX ORDER — IBUPROFEN 200 MG
TABLET ORAL
Qty: 14 PATCH | Refills: 0 | Status: SHIPPED | OUTPATIENT
Start: 2024-06-03 | End: 2024-06-17

## 2024-06-03 NOTE — PROGRESS NOTES
Patient will be participating in biweekly tobacco cessation meetings and will begin the prescribed tobacco cessation medication regimen of  21 mg nicotine + 14 mg nicotine patches QD .  Patient currently smokes 40 cigarettes per day.  Pt's exhaled carbon monoxide level was 54 ppm as per Smokerlyzer. (non- smoker = 0-5 ppm.)   Discussed the role of tobacco cessation program, role of nicotine replacement therapy  (NRT) and behavioral changes to assist the patient to reach his goal of being tobacco free.   Education and instruction on the role of the NRT, usage and  proper placement of the patch. Patient verbalized understanding and willingness to use patch. Patient understands he can call CTTS at any time.

## 2024-06-10 ENCOUNTER — PATIENT MESSAGE (OUTPATIENT)
Dept: INTERNAL MEDICINE | Facility: CLINIC | Age: 49
End: 2024-06-10
Payer: COMMERCIAL

## 2024-06-17 ENCOUNTER — CLINICAL SUPPORT (OUTPATIENT)
Dept: SMOKING CESSATION | Facility: CLINIC | Age: 49
End: 2024-06-17
Payer: COMMERCIAL

## 2024-06-17 DIAGNOSIS — F17.200 NICOTINE DEPENDENCE: ICD-10-CM

## 2024-06-17 PROCEDURE — 99404 PREV MED CNSL INDIV APPRX 60: CPT | Mod: S$GLB,,, | Performed by: DIETITIAN, REGISTERED

## 2024-06-17 PROCEDURE — 99999 PR PBB SHADOW E&M-EST. PATIENT-LVL III: CPT | Mod: PBBFAC,,, | Performed by: DIETITIAN, REGISTERED

## 2024-06-17 RX ORDER — IBUPROFEN 200 MG
TABLET ORAL
Qty: 14 PATCH | Refills: 0 | Status: SHIPPED | OUTPATIENT
Start: 2024-06-17

## 2024-06-17 NOTE — PROGRESS NOTES
"Individual Follow-Up Form    6/17/2024    Quit Date: ---    Clinical Status of Patient: Outpatient    Length of Service: 60 minutes    Continuing Medication: yes  Patches    Other Medications: none      Target Symptoms: Withdrawal and medication side effects. The following were  rated moderate (3) to severe (4) on TCRS:  Moderate (3): crave/desire  Severe (4): none    Comments: Patient presents in clinic today for follow up smoking 35-36 cigarettes per day. Pt states he had issues with his pharmacy and was not able to  his nicotine patches Rx and thus has yet to start therapy of tobacco cessation medication of  21 mg + 14 mg nicotine patches QD.  Will move Rx to [facility] today. Pt is doing well with rate reduction and wait times prior to smoking, sharing he has started to notice how many cigarettes he smokes out of pure habit without even craving them.  Pt's exhaled carbon monoxide level was 48 ppm as per Smokerlyzer. (non- smoker = 0-5 ppm.) . Discussed "Health and Tobacco" session packet and pt verbalized understanding. Reviewed coping strategies/habitual behaviors prevention with patient. Patient understands he can call CTTS at any time.       Diagnosis: F17.200    Next Visit: 2 weeks    "

## 2024-06-24 DIAGNOSIS — E78.2 MIXED HYPERLIPIDEMIA: ICD-10-CM

## 2024-06-24 DIAGNOSIS — E11.9 CONTROLLED TYPE 2 DIABETES MELLITUS WITHOUT COMPLICATION, WITH LONG-TERM CURRENT USE OF INSULIN: ICD-10-CM

## 2024-06-24 DIAGNOSIS — Z79.4 CONTROLLED TYPE 2 DIABETES MELLITUS WITHOUT COMPLICATION, WITH LONG-TERM CURRENT USE OF INSULIN: ICD-10-CM

## 2024-06-24 NOTE — TELEPHONE ENCOUNTER
No care due was identified.  [facility] Embedded Care Due Messages. Reference number: [id number].   6/24/2024 6:49:30 PM CDT

## 2024-06-25 RX ORDER — ATORVASTATIN CALCIUM 20 MG/1
20 TABLET, FILM COATED ORAL DAILY
Qty: 90 TABLET | Refills: 3 | Status: SHIPPED | OUTPATIENT
Start: 2024-06-25 | End: 2025-06-25

## 2024-07-02 DIAGNOSIS — F17.200 NICOTINE DEPENDENCE: ICD-10-CM

## 2024-07-03 RX ORDER — IBUPROFEN 200 MG
TABLET ORAL
Qty: 14 PATCH | Refills: 0 | Status: SHIPPED | OUTPATIENT
Start: 2024-07-03

## 2024-07-03 NOTE — TELEPHONE ENCOUNTER
Smoking Cessation clinic - Pt sent in[employer]haani Systemssket message for nicotine patches Rx refill. Will approve refill today as pt's Rx was scheduled to run out before follow up appt. Follow up appt is already scheduled for 7/10/2024.

## 2024-07-10 ENCOUNTER — CLINICAL SUPPORT (OUTPATIENT)
Dept: SMOKING CESSATION | Facility: CLINIC | Age: 49
End: 2024-07-10
Payer: COMMERCIAL

## 2024-07-10 DIAGNOSIS — F17.200 NICOTINE DEPENDENCE: ICD-10-CM

## 2024-07-10 PROCEDURE — 99999 PR PBB SHADOW E&M-EST. PATIENT-LVL III: CPT | Mod: PBBFAC,,, | Performed by: DIETITIAN, REGISTERED

## 2024-07-10 PROCEDURE — 99404 PREV MED CNSL INDIV APPRX 60: CPT | Mod: S$GLB,,, | Performed by: DIETITIAN, REGISTERED

## 2024-07-10 RX ORDER — IBUPROFEN 200 MG
TABLET ORAL
Qty: 14 PATCH | Refills: 0 | Status: SHIPPED | OUTPATIENT
Start: 2024-07-10

## 2024-07-10 NOTE — PROGRESS NOTES
"Individual Follow-Up Form    7/10/2024    Quit Date: ---    Clinical Status of Patient: Outpatient    Length of Service: 60 minutes    Continuing Medication: yes  Patches    Other Medications: none     Target Symptoms: Withdrawal and medication side effects. The following were  rated moderate (3) to severe (4) on TCRS:  Moderate (3): crave/desire  Severe (4): none    Comments: Patient presents in clinic today for follow up smoking 20-25 cigarettes per day. Pt remains on tobacco cessation medication of  21 mg nicotine patch + 14 mg nicotine patch QD. No adverse effects noted at this time. Pt doing well with rate reduction and wait times prior to smoking.  Pt's exhaled carbon monoxide level was 33 ppm as per Smokerlyzer. (non- smoker = 0-5 ppm.)  Discussed "Managing Behavior" session packet and pt verbalized understanding. Pt states he no longer smokes while driving and has realized he smokes more while at work, then when not working. Pt states he will try harder at concentrating on not smoking while at work. Reviewed coping strategies/habitual behaviors with patient. Patient understands he can call CTTS at any time.       Diagnosis: F17.200    Next Visit: 2 weeks    "

## 2024-07-31 ENCOUNTER — CLINICAL SUPPORT (OUTPATIENT)
Dept: SMOKING CESSATION | Facility: CLINIC | Age: 49
End: 2024-07-31
Payer: COMMERCIAL

## 2024-07-31 DIAGNOSIS — F17.200 NICOTINE DEPENDENCE: ICD-10-CM

## 2024-07-31 PROCEDURE — 99404 PREV MED CNSL INDIV APPRX 60: CPT | Mod: S$GLB,,, | Performed by: DIETITIAN, REGISTERED

## 2024-07-31 PROCEDURE — 99999 PR PBB SHADOW E&M-EST. PATIENT-LVL III: CPT | Mod: PBBFAC,,, | Performed by: DIETITIAN, REGISTERED

## 2024-07-31 RX ORDER — IBUPROFEN 200 MG
TABLET ORAL
Qty: 14 PATCH | Refills: 0 | Status: SHIPPED | OUTPATIENT
Start: 2024-07-31

## 2024-07-31 NOTE — PROGRESS NOTES
Individual Follow-Up Form    7/31/2024    Quit Date: ---    Clinical Status of Patient: Outpatient    Length of Service: 60 minutes    Continuing Medication: yes  Patches (21mg + 14mg)    Other Medications: none     Target Symptoms: Withdrawal and medication side effects. The following were  rated moderate (3) to severe (4) on TCRS:  Moderate (3): crave/desire  Severe (4): none    Comments: Patient presents in clinic today for follow up smoking 21-27 cigarettes per day. Pt remains on tobacco cessation medication of  21 mg + 14 mg nicotine patches QD. No adverse effects noted at this time. Pt is working on using distraction as his main way to decrease his daily cigarettes and shares he was able to watch a 2-hour movie, and take a 4-hour flight without smoking and without problems.  Pt's exhaled carbon monoxide level was 27 ppm as per Smokerlyzer. (non- smoker = 0-5 ppm.)  Pt no longer smokes while driving, and states he is currently working on behaviors (still smokes while drinking coffee, takes off patch sometimes, does not smoke in airplanes/ movies). Reminded pt to put himself in situations where smoking is not an option for continued smoke-free times. Pt states he has taken his nicotine patches off at times to smoke more. Reminded pt to maintain patches on for 24 hours for maximum benefit. Pt verbalized understanding. Reviewed coping strategies/habitual behaviors with patient. Patient understands he can call CTTS at any time.       Diagnosis: F17.200    Next Visit: 2 weeks

## 2024-08-22 ENCOUNTER — TELEPHONE (OUTPATIENT)
Dept: SMOKING CESSATION | Facility: CLINIC | Age: 49
End: 2024-08-22
Payer: COMMERCIAL

## 2024-08-22 NOTE — TELEPHONE ENCOUNTER
Smoking Cessation clinic - called pt for progress update and to reschedule last cancelled in-clinic follow up appt. Left voicemail with CTTS' contact info for pt to call back.  Zeke Bennett, [facility]  [phone]

## 2024-08-28 ENCOUNTER — TELEPHONE (OUTPATIENT)
Dept: SMOKING CESSATION | Facility: CLINIC | Age: 49
End: 2024-08-28
Payer: COMMERCIAL

## 2024-08-28 NOTE — TELEPHONE ENCOUNTER
Smoking Cessation clinic - called pt for progress update and to reschedule last cancelled follow up appt. Left voicemail with CTTS' contact info for pt to call back.  Zeke Bennett, [facility]  [phone]

## 2024-09-09 ENCOUNTER — TELEPHONE (OUTPATIENT)
Dept: SMOKING CESSATION | Facility: CLINIC | Age: 49
End: 2024-09-09
Payer: COMMERCIAL

## 2024-09-12 ENCOUNTER — TELEPHONE (OUTPATIENT)
Dept: SMOKING CESSATION | Facility: CLINIC | Age: 49
End: 2024-09-12
Payer: COMMERCIAL

## 2024-09-12 NOTE — TELEPHONE ENCOUNTER
Smoking Cessation clinic - called pt for progress update and to reschedule last cancelled in-clinic follow up appt. Left voicemail with CTTS' contact info for pt to call back.  Zeke Bennett, [facility]  [phone]

## 2024-10-12 DIAGNOSIS — M54.16 LEFT LUMBAR RADICULOPATHY: ICD-10-CM

## 2024-10-12 DIAGNOSIS — F17.200 NICOTINE DEPENDENCE: ICD-10-CM

## 2024-10-12 RX ORDER — IBUPROFEN 200 MG
TABLET ORAL
Qty: 14 PATCH | Refills: 0 | Status: CANCELLED | OUTPATIENT
Start: 2024-10-12

## 2024-10-14 RX ORDER — GABAPENTIN 300 MG/1
300 CAPSULE ORAL 3 TIMES DAILY
Qty: 90 CAPSULE | Refills: 1 | Status: SHIPPED | OUTPATIENT
Start: 2024-10-14 | End: 2024-12-13

## 2024-10-30 ENCOUNTER — TELEPHONE (OUTPATIENT)
Dept: SMOKING CESSATION | Facility: CLINIC | Age: 49
End: 2024-10-30
Payer: COMMERCIAL

## 2024-11-06 ENCOUNTER — CLINICAL SUPPORT (OUTPATIENT)
Dept: SMOKING CESSATION | Facility: CLINIC | Age: 49
End: 2024-11-06
Payer: COMMERCIAL

## 2024-11-06 DIAGNOSIS — F17.200 NICOTINE DEPENDENCE: Primary | ICD-10-CM

## 2024-11-06 PROCEDURE — 99404 PREV MED CNSL INDIV APPRX 60: CPT | Mod: S$GLB,,, | Performed by: DIETITIAN, REGISTERED

## 2024-11-06 PROCEDURE — 99999 PR PBB SHADOW E&M-EST. PATIENT-LVL III: CPT | Mod: PBBFAC,,, | Performed by: DIETITIAN, REGISTERED

## 2024-11-06 RX ORDER — IBUPROFEN 200 MG
TABLET ORAL
Qty: 14 PATCH | Refills: 0 | Status: SHIPPED | OUTPATIENT
Start: 2024-11-06

## 2024-11-06 NOTE — PROGRESS NOTES
Patient will be participating in biweekly tobacco cessation meetings and will begin the prescribed tobacco cessation medication regimen of  21 mg + 14 mg nicotine patches QD .  Patient currently smokes 40 cigarettes per day.  Pt's exhaled carbon monoxide level was 30 ppm as per Smokerlyzer. (non- smoker = 0-5 ppm.)   Discussed the role of tobacco cessation program, role of nicotine replacement therapy  (NRT) and behavioral changes to assist the patient to reach his goal of being tobacco free.   Education and instruction on the role of the NRT, usage and  proper placement of the patch. Patient verbalized understanding and willingness to use patch. Patient understands he can call CTTS at any time.

## 2024-11-11 ENCOUNTER — HOSPITAL ENCOUNTER (EMERGENCY)
Facility: HOSPITAL | Age: 49
Discharge: HOME | End: 2024-11-11
Attending: EMERGENCY MEDICINE
Payer: COMMERCIAL

## 2024-11-11 VITALS
HEART RATE: 74 BPM | RESPIRATION RATE: 18 BRPM | OXYGEN SATURATION: 98 % | SYSTOLIC BLOOD PRESSURE: 120 MMHG | HEIGHT: 69 IN | DIASTOLIC BLOOD PRESSURE: 76 MMHG | TEMPERATURE: 98 F | WEIGHT: 163 LBS | BODY MASS INDEX: 24.14 KG/M2

## 2024-11-11 DIAGNOSIS — L30.8 HERPES ZOSTER DERMATITIS: Primary | ICD-10-CM

## 2024-11-11 DIAGNOSIS — B02.8 HERPES ZOSTER DERMATITIS: Primary | ICD-10-CM

## 2024-11-11 DIAGNOSIS — L30.8 ACUTE VESICULAR DERMATITIS: ICD-10-CM

## 2024-11-11 PROCEDURE — 99284 EMERGENCY DEPT VISIT MOD MDM: CPT

## 2024-11-11 PROCEDURE — 25000003 PHARM REV CODE 250: Performed by: EMERGENCY MEDICINE

## 2024-11-11 RX ORDER — CETIRIZINE HYDROCHLORIDE 10 MG/1
10 TABLET ORAL DAILY
Qty: 14 TABLET | Refills: 0 | Status: SHIPPED | OUTPATIENT
Start: 2024-11-11 | End: 2024-11-25

## 2024-11-11 RX ORDER — DOXYCYCLINE HYCLATE 100 MG/1
100 TABLET, DELAYED RELEASE ORAL 2 TIMES DAILY
COMMUNITY

## 2024-11-11 RX ORDER — HYDROXYZINE HYDROCHLORIDE 25 MG/1
25 TABLET, FILM COATED ORAL NIGHTLY
Qty: 14 TABLET | Refills: 0 | Status: SHIPPED | OUTPATIENT
Start: 2024-11-11 | End: 2024-11-25

## 2024-11-11 RX ORDER — VALACYCLOVIR HYDROCHLORIDE 500 MG/1
1000 TABLET, FILM COATED ORAL 2 TIMES DAILY
Status: DISCONTINUED | OUTPATIENT
Start: 2024-11-11 | End: 2024-11-11 | Stop reason: HOSPADM

## 2024-11-11 RX ORDER — VALACYCLOVIR HYDROCHLORIDE 1 G/1
1000 TABLET, FILM COATED ORAL 3 TIMES DAILY
Qty: 21 TABLET | Refills: 0 | Status: SHIPPED | OUTPATIENT
Start: 2024-11-11 | End: 2024-11-18

## 2024-11-11 RX ADMIN — VALACYCLOVIR HYDROCHLORIDE 1000 MG: 500 TABLET, FILM COATED ORAL at 10:11

## 2024-11-11 NOTE — ED PROVIDER NOTES
Encounter Date: 11/11/2024       History     Chief Complaint   Patient presents with    Insect Bite     Seen at  on sat bite? To r hand, put on antibiotics, now has 3 marks to forearm     49-year-old man with comorbidities of lumbar disc disease, previous shingles, and type 2 diabetes presents to the emergency department for evaluation of acute skin changes noted to his right hand and upper arm noted approximately 3 days ago.  He denies any associated fever or chills, as well as any stacey contact exposure.  He also reports seeking evaluation this previous Friday at an urgent care where they diagnosed him with cellulitis and prescribed doxycycline hyclate which he has been taking as directed since then.    The history is provided by the patient and medical records. No  was used.     Review of patient's allergies indicates:   Allergen Reactions    Ceclor [cefaclor] Hives     Past Medical History:   Diagnosis Date    Atypical nevus of back excised 8/2019    severely atypical     Diabetes mellitus type II     Fever blister     Herniated lumbar intervertebral disc 2011     Past Surgical History:   Procedure Laterality Date    APPENDECTOMY      COLONOSCOPY N/A 8/19/2022    Procedure: COLONOSCOPY;  Surgeon: Vijaya Diane MD;  Location: [address]);  Service: Endoscopy;  Laterality: N/A;  any md-vacc-inst portal-pm prep-clears 4 hrs prior-tb  8/16/22- Pt requesting to stay at current time due to fan Pt ok with DR. Diane to perform scope- [id number]    TRANSFORAMINAL EPIDURAL INJECTION OF STEROID Left 6/16/2023    Procedure: Left L4-5, L5-S1 TFESI;  Surgeon: Gee Carlisle DO;  Location: [facility] PAIN [facility];  Service: Pain Management;  Laterality: Left;     Family History   Problem Relation Name Age of Onset    Diabetes Mother      Hypertension Father      Diabetes Maternal Aunt      Diabetes Maternal Grandmother      Melanoma Neg Hx       Social History     Tobacco Use    Smoking  status: Every Day     Current packs/day: 2.00     Average packs/day: 1.1 packs/day for 36.9 years (39.7 ttl pk-yrs)     Types: Cigarettes     Start date: 1988    Smokeless tobacco: Never    Tobacco comments:     7/10/24 - currently smokes 20-25 cpd     7/31/24 - currently smokes 21-30 cpd   Substance Use Topics    Alcohol use: No    Drug use: Yes     Types: Marijuana     Comment: everyday     Review of Systems    Physical Exam     Initial Vitals [11/11/24 0910]   BP Pulse Resp Temp SpO2   120/76 74 18 98.3 °F (36.8 °C) 98 %      MAP       --         Physical Exam    Vitals reviewed.  Constitutional:   49-year-old  man, well-nourished, well-developed no acute distress noted; multiple professional tattoos noted to right upper extremity   HENT:   Head: Normocephalic and atraumatic. Mouth/Throat: Oropharynx is clear and moist.   Eyes: EOM are normal. Pupils are equal, round, and reactive to light.   Neck: No tracheal deviation present.   Cardiovascular:  Normal rate, regular rhythm and intact distal pulses.           No murmur heard.  Pulmonary/Chest: No stridor. No respiratory distress.     Neurological: He is alert and oriented to person, place, and time.   Ambulatory unassisted   Skin: Skin is warm and dry.   Right hand:  A 1.5 x 1.5 cm lesion of erythematous base with hemorrhagic bulla is noted to the dorsal  base of the right thumb; similar non bullous purpuric is noted to the lateral palm and base of 1st digit with associated palpation tenderness; cluster of unroofed raised bulla are noted to the lateral aspect of the right upper arm without surrounding erythema   Psychiatric: He has a normal mood and affect. His behavior is normal. Thought content normal.         ED Course   Procedures  Labs Reviewed - No data to display       Imaging Results    None          Medications   valACYclovir tablet 1,000 mg (has no administration in time range)     Medical Decision Making  Differential diagnosis:  HZV  dermatitis of C6 dermatome, contact dermatitis,              Attending Attestation:             Attending ED Notes:   Physical findings and history consistent with reactive zoster dermatitis without bacterial superinfection.  Valacyclovir therapy 1000 mg t.i.d. x7 days has been initiated in the ED, and has been prescribed for the following week.  I have also prescribed cetirizine and hydroxyzine to manage his associated itching.  All questions have been answered to the patient's satisfaction, and he will be discharged home in stable condition with instructions to take medications as prescribed, and follow-up with Dermatology next available, and return to the ED for urgent concerns.                             Clinical Impression:  Final diagnoses:  [B02.8, L30.8] Herpes zoster dermatitis (Primary)  [L30.8] Acute vesicular dermatitis          ED Disposition Condition    Discharge Stable          ED Prescriptions       Medication Sig Dispense Start Date End Date Auth. Provider    valACYclovir (VALTREX) 1000 MG tablet Take 1 tablet (1,000 mg total) by mouth 3 (three) times daily. for 7 days 21 tablet 11/11/2024 11/18/2024 Rg Gilbert MD    cetirizine (ZYRTEC) 10 MG tablet Take 1 tablet (10 mg total) by mouth once daily. for 14 days 14 tablet 11/11/2024 11/25/2024 Rg Gilbert MD    hydrOXYzine HCL (ATARAX) 25 MG tablet Take 1 tablet (25 mg total) by mouth every evening. for 14 days 14 tablet 11/11/2024 11/25/2024 Rg Gilbert MD          Follow-up Information       Follow up With Specialties Details Why Contact Info Additional Information    [facility] Emergency Dept Emergency Medicine  As needed, If symptoms worsen [address]  [facility] [address] [phone]     [facility] Dermatology Schedule an appointment as soon as possible for a visit in 1 week  [address] [facility]  [facility] [address] [phone] Please park in [place]  and take elevator to the 5th floor             Rg Gilbert MD  11/11/24 2340

## 2024-11-11 NOTE — ED NOTES
Patient identifiers verified and correct for Pierre Edmonds  LOC: The patient is awake, alert and aware of environment with an appropriate affect, the patient is oriented x 3 and speaking appropriately.   APPEARANCE: Patient appears comfortable and in no acute distress, patient is clean and well groomed.  SKIN: The skin is warm and dry, color consistent with ethnicity, patient has normal skin turgor and moist mucus membranes, pt has redness, swelling and irritation to the right hand.  MUSCULOSKELETAL: Patient moving all extremities spontaneously, no swelling noted.  RESPIRATORY: Airway is open and patent, respirations are spontaneous, patient has a normal effort and rate, no accessory muscle use noted, O2 Sat 97% on room air.  CARDIAC: Patient has a normal rate and regular rhythm, no edema noted, capillary refill < 3 seconds.   GASTRO: Soft and non tender to palpation, no distention noted, Pt states bowel movements have been regular.  : Pt denies any pain or frequency with urination.  NEURO: Pt opens eyes spontaneously, behavior appropriate to situation, follows commands, facial expression symmetrical, bilateral hand grasp equal and even, purposeful motor response noted, normal sensation in all extremities when touched with a finger.

## 2024-11-11 NOTE — ED TRIAGE NOTES
Insect Bite (Seen at  on sat bite? To r hand, put on antibiotics, now has 3 marks to forearm)

## 2024-11-22 ENCOUNTER — LAB VISIT (OUTPATIENT)
Dept: LAB | Facility: HOSPITAL | Age: 49
End: 2024-11-22
Attending: INTERNAL MEDICINE
Payer: COMMERCIAL

## 2024-11-22 ENCOUNTER — OFFICE VISIT (OUTPATIENT)
Dept: INTERNAL MEDICINE | Facility: CLINIC | Age: 49
End: 2024-11-22
Payer: COMMERCIAL

## 2024-11-22 VITALS
HEART RATE: 74 BPM | WEIGHT: 166.25 LBS | SYSTOLIC BLOOD PRESSURE: 118 MMHG | BODY MASS INDEX: 24.62 KG/M2 | DIASTOLIC BLOOD PRESSURE: 68 MMHG | HEIGHT: 69 IN

## 2024-11-22 DIAGNOSIS — Z79.4 CONTROLLED TYPE 2 DIABETES MELLITUS WITHOUT COMPLICATION, WITH LONG-TERM CURRENT USE OF INSULIN: ICD-10-CM

## 2024-11-22 DIAGNOSIS — E11.9 CONTROLLED TYPE 2 DIABETES MELLITUS WITHOUT COMPLICATION, WITH LONG-TERM CURRENT USE OF INSULIN: Primary | ICD-10-CM

## 2024-11-22 DIAGNOSIS — E11.9 CONTROLLED TYPE 2 DIABETES MELLITUS WITHOUT COMPLICATION, WITH LONG-TERM CURRENT USE OF INSULIN: ICD-10-CM

## 2024-11-22 DIAGNOSIS — Z79.4 CONTROLLED TYPE 2 DIABETES MELLITUS WITHOUT COMPLICATION, WITH LONG-TERM CURRENT USE OF INSULIN: Primary | ICD-10-CM

## 2024-11-22 DIAGNOSIS — F17.200 SMOKER: ICD-10-CM

## 2024-11-22 DIAGNOSIS — B02.9 HERPES ZOSTER WITHOUT COMPLICATION: ICD-10-CM

## 2024-11-22 DIAGNOSIS — F31.77 BIPOLAR DISORDER, IN PARTIAL REMISSION, MOST RECENT EPISODE MIXED: ICD-10-CM

## 2024-11-22 DIAGNOSIS — G47.00 INSOMNIA, UNSPECIFIED TYPE: ICD-10-CM

## 2024-11-22 LAB
ALBUMIN SERPL BCP-MCNC: 3.8 G/DL (ref 3.5–5.2)
ALP SERPL-CCNC: 51 U/L (ref 40–150)
ALT SERPL W/O P-5'-P-CCNC: 45 U/L (ref 10–44)
ANION GAP SERPL CALC-SCNC: 9 MMOL/L (ref 8–16)
AST SERPL-CCNC: 39 U/L (ref 10–40)
BILIRUB SERPL-MCNC: 0.5 MG/DL (ref 0.1–1)
BUN SERPL-MCNC: 11 MG/DL (ref 6–20)
CALCIUM SERPL-MCNC: 9.6 MG/DL (ref 8.7–10.5)
CHLORIDE SERPL-SCNC: 104 MMOL/L (ref 95–110)
CO2 SERPL-SCNC: 26 MMOL/L (ref 23–29)
CREAT SERPL-MCNC: 1.3 MG/DL (ref 0.5–1.4)
EST. GFR  (NO RACE VARIABLE): >60 ML/MIN/1.73 M^2
ESTIMATED AVG GLUCOSE: 114 MG/DL (ref 68–131)
GLUCOSE SERPL-MCNC: 79 MG/DL (ref 70–110)
HBA1C MFR BLD: 5.6 % (ref 4–5.6)
POTASSIUM SERPL-SCNC: 4.7 MMOL/L (ref 3.5–5.1)
PROT SERPL-MCNC: 6.8 G/DL (ref 6–8.4)
SODIUM SERPL-SCNC: 139 MMOL/L (ref 136–145)

## 2024-11-22 PROCEDURE — 83036 HEMOGLOBIN GLYCOSYLATED A1C: CPT | Performed by: INTERNAL MEDICINE

## 2024-11-22 PROCEDURE — 80053 COMPREHEN METABOLIC PANEL: CPT | Performed by: INTERNAL MEDICINE

## 2024-11-22 PROCEDURE — 99999 PR PBB SHADOW E&M-EST. PATIENT-LVL IV: CPT | Mod: PBBFAC,,, | Performed by: INTERNAL MEDICINE

## 2024-11-22 NOTE — PROGRESS NOTES
Subjective:       Patient ID: Pierre Edmonds Jr. is a 49 y.o. male.    Chief Complaint:   Follow-up    HPI - he feels well today.  He is recovering from a case of shingles involving his right arm and hand.  Blood sugar has been running in the 140s at home.  Last A1c was well-controlled.  He awakens in the middle of night without the ability to go back to sleep.  He does not have trouble initiating sleep, though.  He is taking Latuda for his bipolar disorder.  He tried over-the-counter melatonin, which did not work.  He declined both COVID and influenza vaccinations today.     PMH:  DM2, insulin requiring.  controlled  Bipolar Disorder  History of multiple HNPs with pain  Cigarette smoker  IBS  GERD  Colon polyps.  Repeat colonoscopy 2025     Meds:  Reviewed and reconciled in EPIC with patient during visit today.     Review of Systems   Constitutional:  Negative for fever.   HENT:  Negative for congestion.    Cardiovascular:  Negative for chest pain.   Gastrointestinal:  Negative for abdominal pain.   Genitourinary:  Negative for difficulty urinating.   Musculoskeletal:  Negative for arthralgias.   Skin:  Positive for rash.   Neurological:  Negative for headaches.   Psychiatric/Behavioral:  Negative for sleep disturbance.        Objective:      Physical Exam  Constitutional:       Appearance: He is normal weight.   HENT:      Head: Normocephalic and atraumatic.   Pulmonary:      Effort: Pulmonary effort is normal.   Skin:     Comments: Healing shingles rash.  Crusted over.  Right hand and right upper arm   Neurological:      General: No focal deficit present.      Mental Status: He is alert.   Psychiatric:         Mood and Affect: Mood normal.         Assessment:       1. Controlled type 2 diabetes mellitus without complication, with long-term current use of insulin    2. Bipolar disorder, in partial remission, most recent episode mixed    3. Smoker    4. Herpes zoster without complication    5. Insomnia,  unspecified type        Plan:       Pierre was seen today for follow-up.    Diagnoses and all orders for this visit:    Controlled type 2 diabetes mellitus without complication, with long-term current use of insulin - has been at goal.  Due for labs.  -     Hemoglobin A1C; Future    Bipolar disorder, in partial remission, most recent episode mixed - stable on treatment.  Metabolic panel today to look for medication affects on electrolytes.  -     COMPREHENSIVE METABOLIC PANEL; Future    Smoker - he smells of cigarettes today.  Encouraged cessation    Herpes zoster without complication - improved.  Monitor    Insomnia, unspecified type - a new problem.  discussed splitting the Latuda into twice daily doses.  He has hydroxyzine at home; try that.  Let me know how this goes    RTC p.r.n., or in 6 months    TERENCE Sanchez MD MPH  Staff Internist

## 2024-11-25 ENCOUNTER — PATIENT MESSAGE (OUTPATIENT)
Dept: INTERNAL MEDICINE | Facility: CLINIC | Age: 49
End: 2024-11-25
Payer: COMMERCIAL

## 2024-11-25 DIAGNOSIS — E11.9 CONTROLLED TYPE 2 DIABETES MELLITUS WITHOUT COMPLICATION, WITH LONG-TERM CURRENT USE OF INSULIN: ICD-10-CM

## 2024-11-25 DIAGNOSIS — Z79.4 CONTROLLED TYPE 2 DIABETES MELLITUS WITHOUT COMPLICATION, WITH LONG-TERM CURRENT USE OF INSULIN: ICD-10-CM

## 2024-11-25 RX ORDER — INSULIN GLARGINE 100 [IU]/ML
12 INJECTION, SOLUTION SUBCUTANEOUS DAILY
Qty: 12 ML | Refills: 3 | Status: SHIPPED | OUTPATIENT
Start: 2024-11-25 | End: 2025-12-30

## 2024-11-27 ENCOUNTER — CLINICAL SUPPORT (OUTPATIENT)
Dept: SMOKING CESSATION | Facility: CLINIC | Age: 49
End: 2024-11-27
Payer: COMMERCIAL

## 2024-11-27 DIAGNOSIS — F17.200 NICOTINE DEPENDENCE: Primary | ICD-10-CM

## 2024-11-27 PROCEDURE — 99403 PREV MED CNSL INDIV APPRX 45: CPT | Mod: S$GLB,,, | Performed by: DIETITIAN, REGISTERED

## 2024-11-27 PROCEDURE — 99999 PR PBB SHADOW E&M-EST. PATIENT-LVL II: CPT | Mod: PBBFAC,,, | Performed by: DIETITIAN, REGISTERED

## 2024-11-27 RX ORDER — IBUPROFEN 200 MG
TABLET ORAL
Qty: 14 PATCH | Refills: 0 | Status: SHIPPED | OUTPATIENT
Start: 2024-11-27

## 2024-11-27 NOTE — PROGRESS NOTES
Individual Follow-Up Form    11/27/2024    Quit Date: ---    Clinical Status of Patient: Outpatient    Length of Service: 45 minutes    Continuing Medication: yes  Patches    Other Medications: none     Target Symptoms: Withdrawal and medication side effects. The following were  rated moderate (3) to severe (4) on TCRS:  Moderate (3): crave/desire  Severe (4): none    Comments: Patient presents in clinic today for follow up smoking 37 cigarettes per day. Pt remains on tobacco cessation medication of  21 mg + 14 mg nicotine patches QD. No adverse effects noted at this time. Pt doing well with rate reduction and wait times prior to smoking.  Pt's exhaled carbon monoxide level was 38 ppm as per Smokerlyzer. (non- smoker = 0-5 ppm.)  Pt shares patches keep falling off while he is asleep. Recommended he use medical tape to secure them to his skin overnight and pt verbalized understanding. Pt also shares he has plans to get down to one-pack per day by New Years day (1/1/2025). Discussed SMART goals and advised pt to focus on creating daily/weekly SMART goals that will drive his motivation. Pt verbalized understanding and agreement. Reviewed coping strategies/habitual behaviorswith patient. Patient understands he can call CTTS at any time.       Diagnosis: F17.200    Next Visit: 2 weeks

## 2024-12-03 ENCOUNTER — EPISODE CHANGES (OUTPATIENT)
Dept: SMOKING CESSATION | Facility: CLINIC | Age: 49
End: 2024-12-03

## 2024-12-11 ENCOUNTER — TELEPHONE (OUTPATIENT)
Dept: SMOKING CESSATION | Facility: CLINIC | Age: 49
End: 2024-12-11
Payer: COMMERCIAL

## 2024-12-11 NOTE — TELEPHONE ENCOUNTER
Sm[facility] Cessation Clinic- called patient after 7 mins no show to scheduled in-clinic follow up appointment today. Left message for patient to call back for Smoking Cessation progress update or to reschedule.  Zeke Bennett, [facility]  [phone]

## 2024-12-12 ENCOUNTER — TELEPHONE (OUTPATIENT)
Dept: SMOKING CESSATION | Facility: CLINIC | Age: 49
End: 2024-12-12
Payer: COMMERCIAL

## 2024-12-12 NOTE — TELEPHONE ENCOUNTER
Smoking Cessation clinic - called pt for progress update and to reschedule yesterday's missed in-clinic follow up appt. Left voicemail with CTTS' contact info for pt to call back.  Zeke Bennett, [facility]  [phone]

## 2024-12-19 ENCOUNTER — TELEPHONE (OUTPATIENT)
Dept: SMOKING CESSATION | Facility: CLINIC | Age: 49
End: 2024-12-19
Payer: COMMERCIAL

## 2024-12-19 NOTE — TELEPHONE ENCOUNTER
Smoking Cessation clinic - called pt for progress update and to reschedule last missed in-clinic follow up appt. Left voicemail with CTTS' contact info + 's info for pt to call back and reschedule.  Zeke Bennett, [facility]  Clinic: [phone]   : [phone]

## 2025-02-10 ENCOUNTER — TELEPHONE (OUTPATIENT)
Dept: SMOKING CESSATION | Facility: CLINIC | Age: 50
End: 2025-02-10
Payer: COMMERCIAL

## 2025-05-05 ENCOUNTER — TELEPHONE (OUTPATIENT)
Dept: SMOKING CESSATION | Facility: CLINIC | Age: 50
End: 2025-05-05
Payer: COMMERCIAL

## 2025-05-12 DIAGNOSIS — E11.9 CONTROLLED TYPE 2 DIABETES MELLITUS WITHOUT COMPLICATION, WITH LONG-TERM CURRENT USE OF INSULIN: ICD-10-CM

## 2025-05-12 DIAGNOSIS — E78.2 MIXED HYPERLIPIDEMIA: ICD-10-CM

## 2025-05-12 DIAGNOSIS — Z79.4 CONTROLLED TYPE 2 DIABETES MELLITUS WITHOUT COMPLICATION, WITH LONG-TERM CURRENT USE OF INSULIN: ICD-10-CM

## 2025-05-13 RX ORDER — ATORVASTATIN CALCIUM 20 MG/1
20 TABLET, FILM COATED ORAL DAILY
Qty: 90 TABLET | Refills: 0 | Status: SHIPPED | OUTPATIENT
Start: 2025-05-13 | End: 2025-08-11

## 2025-05-13 NOTE — TELEPHONE ENCOUNTER
Care Due:                  Date            Visit Type   Department     Provider  --------------------------------------------------------------------------------                                EP -                              PRIMARY      [facility] INTERNAL  Last Visit: 11-      CARE ([facility])   ALTAF Sanchez                              EP -                              PRIMARY      [facility] INTERNAL  Next Visit: 05-      CARE ([facility])   ALTAF Sanchez                                                            Last  Test          Frequency    Reason                     Performed    Due Date  --------------------------------------------------------------------------------    HBA1C.......  6 months...  insulin..................  11- 05-    Lipid Panel.  12 months..  atorvastatin.............  05- 05-    Health [facility] Embedded Care Due Messages. Reference number: [id number].   5/12/2025 8:10:55 PM CDT

## 2025-05-13 NOTE — TELEPHONE ENCOUNTER
Provider Staff:  Action required for this patient    Requires labs      Please see care gap opportunities below in Care Due Message.    Thanks!  [facility]     Appointments      Date Provider   Last Visit   11/22/2024 Jose Sanchez II, MD   Next Visit   5/27/2025 Jose Sanchez II, MD     Refill Decision Note   Pierre Edmonds  is requesting a refill authorization.  Brief Assessment and Rationale for Refill:  Approve     Medication Therapy Plan:  FOVS      Comments:     Note composed:9:08 AM 05/13/2025

## 2025-05-27 ENCOUNTER — OFFICE VISIT (OUTPATIENT)
Dept: INTERNAL MEDICINE | Facility: CLINIC | Age: 50
End: 2025-05-27
Payer: COMMERCIAL

## 2025-05-27 ENCOUNTER — PATIENT MESSAGE (OUTPATIENT)
Dept: INTERNAL MEDICINE | Facility: CLINIC | Age: 50
End: 2025-05-27

## 2025-05-27 ENCOUNTER — LAB VISIT (OUTPATIENT)
Dept: LAB | Facility: HOSPITAL | Age: 50
End: 2025-05-27
Attending: INTERNAL MEDICINE
Payer: COMMERCIAL

## 2025-05-27 VITALS
DIASTOLIC BLOOD PRESSURE: 60 MMHG | BODY MASS INDEX: 25.18 KG/M2 | HEART RATE: 72 BPM | WEIGHT: 170 LBS | SYSTOLIC BLOOD PRESSURE: 112 MMHG | HEIGHT: 69 IN

## 2025-05-27 DIAGNOSIS — Z79.4 CONTROLLED TYPE 2 DIABETES MELLITUS WITHOUT COMPLICATION, WITH LONG-TERM CURRENT USE OF INSULIN: ICD-10-CM

## 2025-05-27 DIAGNOSIS — E11.69 HYPERLIPIDEMIA ASSOCIATED WITH TYPE 2 DIABETES MELLITUS: ICD-10-CM

## 2025-05-27 DIAGNOSIS — F31.77 BIPOLAR DISORDER, IN PARTIAL REMISSION, MOST RECENT EPISODE MIXED: ICD-10-CM

## 2025-05-27 DIAGNOSIS — E11.9 CONTROLLED TYPE 2 DIABETES MELLITUS WITHOUT COMPLICATION, WITH LONG-TERM CURRENT USE OF INSULIN: ICD-10-CM

## 2025-05-27 DIAGNOSIS — J43.9 PULMONARY EMPHYSEMA, UNSPECIFIED EMPHYSEMA TYPE: ICD-10-CM

## 2025-05-27 DIAGNOSIS — E78.5 HYPERLIPIDEMIA ASSOCIATED WITH TYPE 2 DIABETES MELLITUS: ICD-10-CM

## 2025-05-27 LAB
ALBUMIN SERPL BCP-MCNC: 3.5 G/DL (ref 3.5–5.2)
ALBUMIN/CREAT UR: NORMAL
ALP SERPL-CCNC: 45 UNIT/L (ref 40–150)
ALT SERPL W/O P-5'-P-CCNC: 39 UNIT/L (ref 10–44)
ANION GAP (OHS): 10 MMOL/L (ref 8–16)
AST SERPL-CCNC: 23 UNIT/L (ref 11–45)
BILIRUB SERPL-MCNC: 0.4 MG/DL (ref 0.1–1)
BUN SERPL-MCNC: 12 MG/DL (ref 6–20)
CALCIUM SERPL-MCNC: 8.6 MG/DL (ref 8.7–10.5)
CHLORIDE SERPL-SCNC: 103 MMOL/L (ref 95–110)
CHOLEST SERPL-MCNC: 137 MG/DL (ref 120–199)
CHOLEST/HDLC SERPL: 3.3 {RATIO} (ref 2–5)
CO2 SERPL-SCNC: 24 MMOL/L (ref 23–29)
CREAT SERPL-MCNC: 1.2 MG/DL (ref 0.5–1.4)
CREAT UR-MCNC: 107 MG/DL (ref 23–375)
EAG (OHS): 183 MG/DL (ref 68–131)
GFR SERPLBLD CREATININE-BSD FMLA CKD-EPI: >60 ML/MIN/1.73/M2
GLUCOSE SERPL-MCNC: 179 MG/DL (ref 70–110)
HBA1C MFR BLD: 8 % (ref 4–5.6)
HDLC SERPL-MCNC: 41 MG/DL (ref 40–75)
HDLC SERPL: 29.9 % (ref 20–50)
LDLC SERPL CALC-MCNC: 75.4 MG/DL (ref 63–159)
MICROALBUMIN UR-MCNC: <5 UG/ML (ref ?–5000)
NONHDLC SERPL-MCNC: 96 MG/DL
POTASSIUM SERPL-SCNC: 4.7 MMOL/L (ref 3.5–5.1)
PROT SERPL-MCNC: 6.5 GM/DL (ref 6–8.4)
SODIUM SERPL-SCNC: 137 MMOL/L (ref 136–145)
TRIGL SERPL-MCNC: 103 MG/DL (ref 30–150)

## 2025-05-27 PROCEDURE — 1160F RVW MEDS BY RX/DR IN RCRD: CPT | Mod: CPTII,S$GLB,, | Performed by: INTERNAL MEDICINE

## 2025-05-27 PROCEDURE — 82043 UR ALBUMIN QUANTITATIVE: CPT | Performed by: INTERNAL MEDICINE

## 2025-05-27 PROCEDURE — 3008F BODY MASS INDEX DOCD: CPT | Mod: CPTII,S$GLB,, | Performed by: INTERNAL MEDICINE

## 2025-05-27 PROCEDURE — 3074F SYST BP LT 130 MM HG: CPT | Mod: CPTII,S$GLB,, | Performed by: INTERNAL MEDICINE

## 2025-05-27 PROCEDURE — 80061 LIPID PANEL: CPT

## 2025-05-27 PROCEDURE — 1159F MED LIST DOCD IN RCRD: CPT | Mod: CPTII,S$GLB,, | Performed by: INTERNAL MEDICINE

## 2025-05-27 PROCEDURE — 99999 PR PBB SHADOW E&M-EST. PATIENT-LVL IV: CPT | Mod: PBBFAC,,, | Performed by: INTERNAL MEDICINE

## 2025-05-27 PROCEDURE — 82040 ASSAY OF SERUM ALBUMIN: CPT

## 2025-05-27 PROCEDURE — 99214 OFFICE O/P EST MOD 30 MIN: CPT | Mod: S$GLB,,, | Performed by: INTERNAL MEDICINE

## 2025-05-27 PROCEDURE — 36415 COLL VENOUS BLD VENIPUNCTURE: CPT

## 2025-05-27 PROCEDURE — 83036 HEMOGLOBIN GLYCOSYLATED A1C: CPT

## 2025-05-27 PROCEDURE — 3078F DIAST BP <80 MM HG: CPT | Mod: CPTII,S$GLB,, | Performed by: INTERNAL MEDICINE

## 2025-05-27 RX ORDER — INSULIN GLARGINE 100 [IU]/ML
20 INJECTION, SOLUTION SUBCUTANEOUS DAILY
Qty: 18 ML | Refills: 3 | Status: SHIPPED | OUTPATIENT
Start: 2025-05-27 | End: 2026-05-27

## 2025-05-27 RX ORDER — INSULIN GLARGINE 100 [IU]/ML
16 INJECTION, SOLUTION SUBCUTANEOUS DAILY
Qty: 15 ML | Refills: 3 | Status: SHIPPED | OUTPATIENT
Start: 2025-05-27 | End: 2025-05-27 | Stop reason: SDUPTHER

## 2025-05-27 RX ORDER — IBUPROFEN 800 MG/1
800 TABLET, FILM COATED ORAL EVERY 6 HOURS PRN
COMMUNITY
Start: 2025-05-23 | End: 2025-06-02

## 2025-05-27 RX ORDER — CLINDAMYCIN HYDROCHLORIDE 300 MG/1
300 CAPSULE ORAL
COMMUNITY
Start: 2025-05-23 | End: 2025-06-02

## 2025-05-27 NOTE — PROGRESS NOTES
Subjective:       Patient ID: Pierre Edmonds Jr. is a 49 y.o. male.    Chief Complaint:   Follow-up    HPI - Mr. Edmonds feels well today.  He continues to smoke cigarettes, though he states he is down to just over 1 pack per day.  He is due for some diabetic health maintenance.  He is using 16 units of insulin every night.  He is taking all medications as prescribed.  He has no complaints     PMH:  DM2, insulin requiring.  controlled  Bipolar Disorder  History of multiple HNPs with pain  Cigarette smoker  IBS  GERD  Colon polyps.  Repeat colonoscopy 2025     Meds:  Reviewed and reconciled in EPIC with patient during visit today.    Review of Systems   Constitutional:  Negative for fever.   HENT:  Negative for congestion.    Respiratory:  Negative for shortness of breath.    Cardiovascular:  Negative for chest pain.   Gastrointestinal:  Negative for abdominal pain.   Genitourinary:  Negative for difficulty urinating.   Musculoskeletal:  Negative for arthralgias.   Skin:  Negative for rash.   Neurological:  Negative for headaches.   Psychiatric/Behavioral:  Negative for sleep disturbance.        Objective:      Physical Exam  Constitutional:       General: He is not in acute distress.     Appearance: He is well-developed. He is not diaphoretic.      Comments: Lean, well-appearing man in no distress.  He smells of cigarettes   HENT:      Head: Normocephalic and atraumatic.   Cardiovascular:      Rate and Rhythm: Normal rate and regular rhythm.      Pulses:           Dorsalis pedis pulses are 2+ on the right side and 2+ on the left side.      Heart sounds: Normal heart sounds. No murmur heard.     No friction rub. No gallop.   Pulmonary:      Effort: No respiratory distress.      Breath sounds: No wheezing or rales.   Chest:      Chest wall: No tenderness.   Feet:      Right foot:      Protective Sensation: 4 sites tested.  4 sites sensed.      Skin integrity: No ulcer, blister or skin breakdown.      Left foot:       Protective Sensation: 4 sites tested.  4 sites sensed.      Skin integrity: No ulcer, blister or skin breakdown.   Skin:     General: Skin is warm.      Findings: No erythema.   Neurological:      Mental Status: He is alert and oriented to person, place, and time.   Psychiatric:         Thought Content: Thought content normal.       Assessment:       1. Controlled type 2 diabetes mellitus without complication, with long-term current use of insulin    2. Hyperlipidemia associated with type 2 diabetes mellitus    3. Bipolar disorder, in partial remission, most recent episode mixed    4. Pulmonary emphysema, unspecified emphysema type        Plan:       Pierre was seen today for follow-up.    Diagnoses and all orders for this visit:    Controlled type 2 diabetes mellitus without complication, with long-term current use of insulin - this has been well-controlled.  I adjusted his prescription to reflect his dose.  We will do labs today.  -     insulin glargine U-100, Lantus, 100 unit/mL (3 mL) SubQ InPn pen; Inject 16 Units into the skin once daily.  -     Comprehensive Metabolic Panel; Future  -     Hemoglobin A1C; Future  -     Microalbumin/Creatinine Ratio, Urine    Hyperlipidemia associated with type 2 diabetes mellitus - stable.  Repeating lipid panel today because he is fasting and it is overdue  -     Lipid panel; Future    Bipolar disorder, in partial remission, most recent episode mixed - stable on treatment.  Monitor    Pulmonary emphysema, unspecified emphysema type - asymptomatic today with no wheezing on exam.  I encouraged smoking cessation.  Continue to monitor    RTC p.r.n., or in 6 months for diabetes    G Jayy Sanchez MD MPH  Staff Internist

## 2025-05-29 ENCOUNTER — TELEPHONE (OUTPATIENT)
Dept: SMOKING CESSATION | Facility: CLINIC | Age: 50
End: 2025-05-29
Payer: COMMERCIAL

## 2025-06-09 DIAGNOSIS — N52.9 ERECTILE DYSFUNCTION, UNSPECIFIED ERECTILE DYSFUNCTION TYPE: ICD-10-CM

## 2025-06-09 DIAGNOSIS — E11.9 CONTROLLED TYPE 2 DIABETES MELLITUS WITHOUT COMPLICATION, WITH LONG-TERM CURRENT USE OF INSULIN: ICD-10-CM

## 2025-06-09 DIAGNOSIS — Z79.4 CONTROLLED TYPE 2 DIABETES MELLITUS WITHOUT COMPLICATION, WITH LONG-TERM CURRENT USE OF INSULIN: ICD-10-CM

## 2025-06-09 RX ORDER — INSULIN GLARGINE 100 [IU]/ML
20 INJECTION, SOLUTION SUBCUTANEOUS DAILY
Qty: 18 ML | Refills: 3 | Status: SHIPPED | OUTPATIENT
Start: 2025-06-09 | End: 2026-06-09

## 2025-06-09 RX ORDER — SILDENAFIL 100 MG/1
100 TABLET, FILM COATED ORAL DAILY PRN
Qty: 5 TABLET | Refills: 6 | Status: SHIPPED | OUTPATIENT
Start: 2025-06-09 | End: 2026-06-09

## 2025-06-09 NOTE — TELEPHONE ENCOUNTER
Please call him.  I re-routed the prescription to our pharmacy here.  We have the insulin that he needs, so he can pick it up here.    D

## 2025-06-09 NOTE — TELEPHONE ENCOUNTER
No care due was identified.  [facility] Embedded Care Due Messages. Reference number: [id number].   6/09/2025 9:14:08 AM CDT

## 2025-06-09 NOTE — TELEPHONE ENCOUNTER
No care due was identified.  Health [facility] Embedded Care Due Messages. Reference number: [id number].   6/09/2025 9:15:04 AM CDT

## 2025-06-09 NOTE — TELEPHONE ENCOUNTER
Refill Routing Note   Medication(s) are not appropriate for processing by [facility] for the following reason(s):        No active prescription written by provider  Due for refill >6 months ago    [facility] action(s):  Defer             Appointments  past 12m or future 3m with PCP    Date Provider   Last Visit   5/27/2025 Jose Sanchez II, MD   Next Visit   6/9/2025 Jose Sanchez II, MD   ED visits in past 90 days: 0        Note composed:3:04 PM 06/09/2025

## 2025-07-01 DIAGNOSIS — Z79.4 CONTROLLED TYPE 2 DIABETES MELLITUS WITHOUT COMPLICATION, WITH LONG-TERM CURRENT USE OF INSULIN: ICD-10-CM

## 2025-07-01 DIAGNOSIS — E11.9 CONTROLLED TYPE 2 DIABETES MELLITUS WITHOUT COMPLICATION, WITH LONG-TERM CURRENT USE OF INSULIN: ICD-10-CM

## 2025-07-01 RX ORDER — INSULIN GLARGINE 100 [IU]/ML
20 INJECTION, SOLUTION SUBCUTANEOUS DAILY
Qty: 18 ML | Refills: 3 | Status: SHIPPED | OUTPATIENT
Start: 2025-07-01 | End: 2026-07-01

## 2025-07-01 NOTE — TELEPHONE ENCOUNTER
No care due was identified.  [facility] Embedded Care Due Messages. Reference number: [id number].   7/01/2025 10:12:03 AM CDT

## 2025-08-05 ENCOUNTER — OFFICE VISIT (OUTPATIENT)
Dept: INTERNAL MEDICINE | Facility: CLINIC | Age: 50
End: 2025-08-05
Payer: COMMERCIAL

## 2025-08-05 VITALS
OXYGEN SATURATION: 96 % | SYSTOLIC BLOOD PRESSURE: 126 MMHG | WEIGHT: 164.25 LBS | DIASTOLIC BLOOD PRESSURE: 70 MMHG | HEART RATE: 78 BPM | BODY MASS INDEX: 24.25 KG/M2 | RESPIRATION RATE: 18 BRPM

## 2025-08-05 DIAGNOSIS — Z79.4 TYPE 2 DIABETES MELLITUS WITH HYPERGLYCEMIA, WITH LONG-TERM CURRENT USE OF INSULIN: Primary | ICD-10-CM

## 2025-08-05 DIAGNOSIS — G47.00 INSOMNIA, UNSPECIFIED TYPE: ICD-10-CM

## 2025-08-05 DIAGNOSIS — R06.83 SNORING: ICD-10-CM

## 2025-08-05 DIAGNOSIS — R06.81 APNEA: ICD-10-CM

## 2025-08-05 DIAGNOSIS — F17.200 SMOKER: ICD-10-CM

## 2025-08-05 DIAGNOSIS — R14.0 BLOATING: ICD-10-CM

## 2025-08-05 DIAGNOSIS — E11.65 TYPE 2 DIABETES MELLITUS WITH HYPERGLYCEMIA, WITH LONG-TERM CURRENT USE OF INSULIN: Primary | ICD-10-CM

## 2025-08-05 PROCEDURE — 3074F SYST BP LT 130 MM HG: CPT | Mod: CPTII,S$GLB,, | Performed by: INTERNAL MEDICINE

## 2025-08-05 PROCEDURE — 3066F NEPHROPATHY DOC TX: CPT | Mod: CPTII,S$GLB,, | Performed by: INTERNAL MEDICINE

## 2025-08-05 PROCEDURE — 3052F HG A1C>EQUAL 8.0%<EQUAL 9.0%: CPT | Mod: CPTII,S$GLB,, | Performed by: INTERNAL MEDICINE

## 2025-08-05 PROCEDURE — 3078F DIAST BP <80 MM HG: CPT | Mod: CPTII,S$GLB,, | Performed by: INTERNAL MEDICINE

## 2025-08-05 PROCEDURE — 1159F MED LIST DOCD IN RCRD: CPT | Mod: CPTII,S$GLB,, | Performed by: INTERNAL MEDICINE

## 2025-08-05 PROCEDURE — 3008F BODY MASS INDEX DOCD: CPT | Mod: CPTII,S$GLB,, | Performed by: INTERNAL MEDICINE

## 2025-08-05 PROCEDURE — 99215 OFFICE O/P EST HI 40 MIN: CPT | Mod: S$GLB,,, | Performed by: INTERNAL MEDICINE

## 2025-08-05 PROCEDURE — 3061F NEG MICROALBUMINURIA REV: CPT | Mod: CPTII,S$GLB,, | Performed by: INTERNAL MEDICINE

## 2025-08-05 PROCEDURE — 1160F RVW MEDS BY RX/DR IN RCRD: CPT | Mod: CPTII,S$GLB,, | Performed by: INTERNAL MEDICINE

## 2025-08-05 PROCEDURE — 99999 PR PBB SHADOW E&M-EST. PATIENT-LVL V: CPT | Mod: PBBFAC,,, | Performed by: INTERNAL MEDICINE

## 2025-08-05 RX ORDER — CLONAZEPAM 1 MG/1
1 TABLET ORAL 2 TIMES DAILY PRN
COMMUNITY
Start: 2025-07-24

## 2025-08-05 NOTE — PROGRESS NOTES
Subjective:       Patient ID: Pierre Edmonds Jr. is a 50 y.o. male.    Chief Complaint:   Diabetes    HPI - Mr. Edmonds has been having difficulty getting his diabetes under control.  At our last visit in May, his A1c was 8.  This is the 1st time we have seen significant elevation.  Since that time, he has been seeing sugars greater than 200 or even greater than 250 most of the time.  He has increased his glargine insulin to 15 units twice daily, but it will come down.  He has a adjusted his diet.  More protein and less carbohydrates.  With this, he has been bloating.  His bed partner says that he snores and quits breathing at night.  He has long had difficulty with sleep associated with his bipolar disorder.  He does not take Klonopin regularly, and rarely for sleep.  He wonders if he has sleep apnea.  He continues to smoke cigarettes.  He says he is cutting down and is down to 1-1/2 packs per day.  I offered referral to the smoking cessation Clinic, but he says he would rather try to quit cold turkey.    PMH:  DM2, insulin requiring.  Poor control now  Bipolar Disorder  History of multiple HNPs with pain  Heavy Cigarette smoker  IBS  GERD  Colon polyps.  Repeat colonoscopy 2025     Meds:  Reviewed and reconciled in EPIC with patient during visit today.     Review of Systems   Constitutional:  Negative for fever.   HENT:  Negative for congestion.    Respiratory:  Negative for shortness of breath.    Cardiovascular:  Negative for chest pain.   Gastrointestinal:  Positive for abdominal distention.   Genitourinary:  Negative for difficulty urinating.   Musculoskeletal:  Negative for arthralgias.   Skin:  Negative for rash.   Neurological:  Negative for headaches.   Psychiatric/Behavioral:  Positive for sleep disturbance. The patient is nervous/anxious and is hyperactive.        Objective:      Physical Exam  Constitutional:       General: He is not in acute distress.     Appearance: He is well-developed. He is not  diaphoretic.      Comments: Lean, hyperactive man who smells of cigarette smoke   HENT:      Head: Normocephalic and atraumatic.   Cardiovascular:      Rate and Rhythm: Normal rate and regular rhythm.      Heart sounds: Normal heart sounds. No murmur heard.     No friction rub. No gallop.   Pulmonary:      Effort: No respiratory distress.      Breath sounds: No wheezing or rales.   Chest:      Chest wall: No tenderness.   Skin:     General: Skin is warm.      Findings: No erythema.   Neurological:      Mental Status: He is alert and oriented to person, place, and time.   Psychiatric:         Thought Content: Thought content normal.         Assessment:       1. Type 2 diabetes mellitus with hyperglycemia, with long-term current use of insulin    2. Insomnia, unspecified type    3. Smoker    4. Bloating    5. Apnea    6. Snoring        Plan:       Pierre was seen today for diabetes.    Diagnoses and all orders for this visit:    Type 2 diabetes mellitus with hyperglycemia, with long-term current use of insulin - uncontrolled despite substantial increase in insulin dosing.  I will add Jardiance.  I do not want to add metformin because of existing abdominal bloating complaint.  I will ask our diabetic nurse practitioner to help  -     empagliflozin (JARDIANCE) 10 mg tablet; Take 1 tablet (10 mg total) by mouth once daily.  -     Ambulatory Referral/Consult to Primary Care Diabetic Management; Future    Insomnia, unspecified type - longstanding.  I will do a home sleep study.  I recommended trial of a half dose of Klonopin before bedtime for a few days in a row.  I also think his bipolar disorder is not well-controlled, based on his hyperactivity today.  I encouraged him to reach out to his psychiatrist    Smoker - I encouraged cessation.  I offered support, but he wants to do cold turkey.    Bloating - a new complaint.  We discussed that this is most likely due to something in his diet.  We discussed many things that  might cause bloating, including the peanuts that he eats frequently for protein supplement    Apnea - new complaint.  Sleep study ordered  -     Home Sleep Study; Future    Snoring - new issue.  Smoking cessation will help.  Sleep study ordered  -     Home Sleep Study; Future    RTC 3 months    TERENCE Sanchez MD MPH  Staff Internist

## 2025-08-11 ENCOUNTER — OFFICE VISIT (OUTPATIENT)
Dept: INTERNAL MEDICINE | Facility: CLINIC | Age: 50
End: 2025-08-11
Payer: COMMERCIAL

## 2025-08-11 VITALS
HEART RATE: 63 BPM | HEIGHT: 69 IN | OXYGEN SATURATION: 96 % | SYSTOLIC BLOOD PRESSURE: 128 MMHG | BODY MASS INDEX: 24.46 KG/M2 | WEIGHT: 165.13 LBS | DIASTOLIC BLOOD PRESSURE: 72 MMHG

## 2025-08-11 DIAGNOSIS — E11.69 HYPERLIPIDEMIA ASSOCIATED WITH TYPE 2 DIABETES MELLITUS: ICD-10-CM

## 2025-08-11 DIAGNOSIS — Z79.4 TYPE 2 DIABETES MELLITUS WITH HYPERGLYCEMIA, WITH LONG-TERM CURRENT USE OF INSULIN: Primary | ICD-10-CM

## 2025-08-11 DIAGNOSIS — E78.5 HYPERLIPIDEMIA ASSOCIATED WITH TYPE 2 DIABETES MELLITUS: ICD-10-CM

## 2025-08-11 DIAGNOSIS — F17.200 SMOKER: ICD-10-CM

## 2025-08-11 DIAGNOSIS — E11.3291 MILD NONPROLIFERATIVE DIABETIC RETINOPATHY OF RIGHT EYE WITHOUT MACULAR EDEMA ASSOCIATED WITH TYPE 2 DIABETES MELLITUS: ICD-10-CM

## 2025-08-11 DIAGNOSIS — E11.65 TYPE 2 DIABETES MELLITUS WITH HYPERGLYCEMIA, WITH LONG-TERM CURRENT USE OF INSULIN: Primary | ICD-10-CM

## 2025-08-11 LAB — GLUCOSE SERPL-MCNC: 212 MG/DL (ref 70–110)

## 2025-08-11 PROCEDURE — 3078F DIAST BP <80 MM HG: CPT | Mod: CPTII,S$GLB,, | Performed by: NURSE PRACTITIONER

## 2025-08-11 PROCEDURE — 3074F SYST BP LT 130 MM HG: CPT | Mod: CPTII,S$GLB,, | Performed by: NURSE PRACTITIONER

## 2025-08-11 PROCEDURE — 99214 OFFICE O/P EST MOD 30 MIN: CPT | Mod: S$GLB,,, | Performed by: NURSE PRACTITIONER

## 2025-08-11 PROCEDURE — 82962 GLUCOSE BLOOD TEST: CPT | Mod: S$GLB,,, | Performed by: NURSE PRACTITIONER

## 2025-08-11 PROCEDURE — 3008F BODY MASS INDEX DOCD: CPT | Mod: CPTII,S$GLB,, | Performed by: NURSE PRACTITIONER

## 2025-08-11 PROCEDURE — 1160F RVW MEDS BY RX/DR IN RCRD: CPT | Mod: CPTII,S$GLB,, | Performed by: NURSE PRACTITIONER

## 2025-08-11 PROCEDURE — 3061F NEG MICROALBUMINURIA REV: CPT | Mod: CPTII,S$GLB,, | Performed by: NURSE PRACTITIONER

## 2025-08-11 PROCEDURE — G2211 COMPLEX E/M VISIT ADD ON: HCPCS | Mod: S$GLB,,, | Performed by: NURSE PRACTITIONER

## 2025-08-11 PROCEDURE — 1159F MED LIST DOCD IN RCRD: CPT | Mod: CPTII,S$GLB,, | Performed by: NURSE PRACTITIONER

## 2025-08-11 PROCEDURE — 3066F NEPHROPATHY DOC TX: CPT | Mod: CPTII,S$GLB,, | Performed by: NURSE PRACTITIONER

## 2025-08-11 PROCEDURE — 3052F HG A1C>EQUAL 8.0%<EQUAL 9.0%: CPT | Mod: CPTII,S$GLB,, | Performed by: NURSE PRACTITIONER

## 2025-08-11 PROCEDURE — 99999 PR PBB SHADOW E&M-EST. PATIENT-LVL V: CPT | Mod: PBBFAC,,, | Performed by: NURSE PRACTITIONER

## 2025-08-11 RX ORDER — METFORMIN HYDROCHLORIDE 500 MG/1
500 TABLET ORAL 2 TIMES DAILY WITH MEALS
Qty: 180 TABLET | Refills: 3 | Status: SHIPPED | OUTPATIENT
Start: 2025-08-11 | End: 2026-08-11

## 2025-08-14 ENCOUNTER — PATIENT MESSAGE (OUTPATIENT)
Dept: INTERNAL MEDICINE | Facility: CLINIC | Age: 50
End: 2025-08-14
Payer: COMMERCIAL

## 2025-08-18 DIAGNOSIS — E78.2 MIXED HYPERLIPIDEMIA: ICD-10-CM

## 2025-08-18 DIAGNOSIS — Z79.4 CONTROLLED TYPE 2 DIABETES MELLITUS WITHOUT COMPLICATION, WITH LONG-TERM CURRENT USE OF INSULIN: ICD-10-CM

## 2025-08-18 DIAGNOSIS — E11.9 CONTROLLED TYPE 2 DIABETES MELLITUS WITHOUT COMPLICATION, WITH LONG-TERM CURRENT USE OF INSULIN: ICD-10-CM

## 2025-08-18 RX ORDER — ATORVASTATIN CALCIUM 20 MG/1
20 TABLET, FILM COATED ORAL DAILY
Qty: 90 TABLET | Refills: 3 | Status: SHIPPED | OUTPATIENT
Start: 2025-08-18

## 2025-08-22 ENCOUNTER — OFFICE VISIT (OUTPATIENT)
Facility: CLINIC | Age: 50
End: 2025-08-22
Payer: COMMERCIAL

## 2025-08-22 VITALS
HEIGHT: 69 IN | SYSTOLIC BLOOD PRESSURE: 149 MMHG | BODY MASS INDEX: 24.44 KG/M2 | HEART RATE: 83 BPM | DIASTOLIC BLOOD PRESSURE: 79 MMHG | WEIGHT: 165 LBS

## 2025-08-22 DIAGNOSIS — G47.30 SLEEP APNEA, UNSPECIFIED TYPE: Primary | ICD-10-CM

## 2025-08-22 PROCEDURE — 99999 PR PBB SHADOW E&M-EST. PATIENT-LVL III: CPT | Mod: PBBFAC,,, | Performed by: NURSE PRACTITIONER

## 2025-08-27 ENCOUNTER — TELEPHONE (OUTPATIENT)
Dept: SLEEP MEDICINE | Facility: OTHER | Age: 50
End: 2025-08-27
Payer: COMMERCIAL

## 2025-08-28 ENCOUNTER — HOSPITAL ENCOUNTER (OUTPATIENT)
Dept: SLEEP MEDICINE | Facility: OTHER | Age: 50
Discharge: HOME | End: 2025-08-28
Attending: INTERNAL MEDICINE
Payer: COMMERCIAL

## 2025-08-28 DIAGNOSIS — R06.83 SNORING: ICD-10-CM

## 2025-08-28 DIAGNOSIS — R06.81 APNEA: ICD-10-CM

## 2025-08-28 PROCEDURE — 95800 SLP STDY UNATTENDED: CPT

## 2025-08-29 ENCOUNTER — PATIENT MESSAGE (OUTPATIENT)
Dept: INTERNAL MEDICINE | Facility: CLINIC | Age: 50
End: 2025-08-29
Payer: COMMERCIAL

## 2025-08-29 PROBLEM — R06.81 APNEA: Status: ACTIVE | Noted: 2025-08-29

## 2025-09-02 ENCOUNTER — PATIENT MESSAGE (OUTPATIENT)
Dept: INTERNAL MEDICINE | Facility: CLINIC | Age: 50
End: 2025-09-02
Payer: COMMERCIAL

## 2025-09-02 DIAGNOSIS — G47.33 OBSTRUCTIVE SLEEP APNEA SYNDROME: Primary | ICD-10-CM

## (undated) DEVICE — BANDAGE ADHESIVE

## (undated) DEVICE — DRESSING LEUKOPLAST FLEX 1X3IN